# Patient Record
Sex: FEMALE | Race: WHITE | NOT HISPANIC OR LATINO | Employment: FULL TIME | URBAN - METROPOLITAN AREA
[De-identification: names, ages, dates, MRNs, and addresses within clinical notes are randomized per-mention and may not be internally consistent; named-entity substitution may affect disease eponyms.]

---

## 2017-01-02 ENCOUNTER — MYC MEDICAL ADVICE (OUTPATIENT)
Dept: PEDIATRICS | Facility: CLINIC | Age: 47
End: 2017-01-02

## 2017-01-05 NOTE — TELEPHONE ENCOUNTER
My work office advised they faxed a form over to your office. Did you receive it?    Pita  ----- Message -----  From: Office of SHAHRAM Ambriz CNP  Sent: 1/3/2017 7:40 AM CST  To: Pita Shaffer  Subject: RE: Return to Work    Good morning Pita,    Is there a form you would like to drop off or fax to the clinic with the specific documentation you need? Our fax number is [phone].    Thank you,   Ale DE LANGEL RN    ----- Message -----  From: PITA SHAFFER  Sent: 1/2/2017 10:21 PM CST  To: SHAHRAM Ambriz CNP  Subject: Return to Work    Harish Olivera. I forgot to ask when I saw you on Friday if you could sign a return to work document for me. I was on FMLA leave for the past 30+ days while in rehab. I need a letter signed by a doctor that i may return to work. Is this something you can provide?    Regards,  Pita

## 2017-01-05 NOTE — TELEPHONE ENCOUNTER
Staff unable to locate faxed FMLA forms and there is no documentation in patient's chart that forms were received. Sent patient [employer] message asking her if she can have her employer refax forms to fax #: [phone]. Staff will await reply/faxed forms.  Sofia Sullivan CMA

## 2017-01-06 ENCOUNTER — MEDICAL CORRESPONDENCE (OUTPATIENT)
Dept: HEALTH INFORMATION MANAGEMENT | Facility: CLINIC | Age: 47
End: 2017-01-06

## 2017-01-06 NOTE — TELEPHONE ENCOUNTER
Received MyChart message from patient stating that employer has faxed FMLA forms. Patient did not specify which fax number forms were sent to. Staff has checked the fax machines in the area and in the . No forms have been found. Sent patient MyChart message stating that clinic has not received forms.  Sofia Sullivan CMA

## 2017-01-09 NOTE — TELEPHONE ENCOUNTER
[facility] CLINICAL DOCUMENTATION    Form Documentation Form or Letter Request    Type or form/letter needing completion: Fitness for Duty Clarification  Provider: Jaz Torres  Has provider seen patient for office visit related to reason for form request? Yes  Date form needed: 01/09/17 by 12:00 PM  Once completed: Fax form to: [facility][id number] at fax #: [phone]. Send patient [employer] message once form has been faxed.   Sofia Sullivan, CMA

## 2017-01-09 NOTE — TELEPHONE ENCOUNTER
Forms faxed, original mailed to patient. Copy sent to be scanned into patient's chart.    VINCE Lepe

## 2017-01-29 ENCOUNTER — MYC REFILL (OUTPATIENT)
Dept: PEDIATRICS | Facility: CLINIC | Age: 47
End: 2017-01-29

## 2017-01-29 DIAGNOSIS — M51.369 DDD (DEGENERATIVE DISC DISEASE), LUMBAR: ICD-10-CM

## 2017-01-30 RX ORDER — HYDROCODONE BITARTRATE AND ACETAMINOPHEN 5; 325 MG/1; MG/1
1 TABLET ORAL EVERY 6 HOURS PRN
Qty: 15 TABLET | Refills: 0 | Status: SHIPPED | OUTPATIENT
Start: 2017-01-30 | End: 2017-03-07

## 2017-01-30 NOTE — TELEPHONE ENCOUNTER
HYDROcodone-acetaminophen (NORCO) 5-325 MG per tablet      Last Written Prescription Date:  12/30/16  Last Fill Quantity: 15,   # refills: 0  Last Office Visit with [facility], [facility] or [facility] prescribing provider: 12/30/16.  Future Office visit:       Routing refill request to provider for review/approval because:  Drug not on the [facility], [facility] or [facility] refill protocol or controlled substance

## 2017-01-30 NOTE — TELEPHONE ENCOUNTER
Message from [employer]:  Original authorizing provider: SHAHRAM Ambriz CNP would like a refill of the following medications:  HYDROcodone-acetaminophen (NORCO) 5-325 MG per tablet [SHAHRAM Ambriz CNP]    Preferred pharmacy: [facility]    Comment:

## 2017-01-30 NOTE — TELEPHONE ENCOUNTER
Please inform patient, refill/prescription approved and when prescription is ready to be picked up at .

## 2017-02-06 ENCOUNTER — MYC REFILL (OUTPATIENT)
Dept: PEDIATRICS | Facility: CLINIC | Age: 47
End: 2017-02-06

## 2017-02-06 DIAGNOSIS — M51.369 DDD (DEGENERATIVE DISC DISEASE), LUMBAR: ICD-10-CM

## 2017-02-07 RX ORDER — METHOCARBAMOL 750 MG/1
750 TABLET, FILM COATED ORAL EVERY 4 HOURS PRN
Qty: 180 TABLET | Refills: 0 | Status: SHIPPED | OUTPATIENT
Start: 2017-02-07 | End: 2017-05-25

## 2017-02-07 NOTE — TELEPHONE ENCOUNTER
methocarbamol (METHOCARBAMOL) 750 MG tablet      Last Written Prescription Date:  1/10/17  Last Fill Quantity: 180,   # refills: 0  Last Office Visit with [facility], [facility] or [facility] prescribing provider: 12/30/16  Future Office visit:       Routing refill request to provider for review/approval because:  Drug not on the [facility], [facility] or [facility] refill protocol or controlled substance

## 2017-02-07 NOTE — TELEPHONE ENCOUNTER
Message from MyChart:  Original authorizing provider: SHAHRAM Ambriz CNP would like a refill of the following medications:  methocarbamol (METHOCARBAMOL) 750 MG tablet [SHAHRAM Ambriz CNP]    Preferred pharmacy: [facility] [address] [place] [address]    Comment:

## 2017-02-22 ENCOUNTER — TRANSFERRED RECORDS (OUTPATIENT)
Dept: HEALTH INFORMATION MANAGEMENT | Facility: CLINIC | Age: 47
End: 2017-02-22

## 2017-02-23 ENCOUNTER — MYC MEDICAL ADVICE (OUTPATIENT)
Dept: PEDIATRICS | Facility: CLINIC | Age: 47
End: 2017-02-23

## 2017-02-23 NOTE — TELEPHONE ENCOUNTER
Hi.  I just faxed over diagnostic tests for myself and my , David Maher.  Please confirm you received the two documents.    Regards,  Pita  [phone]

## 2017-02-24 NOTE — TELEPHONE ENCOUNTER
Unable to find faxes that patient sent over this am.  Sent a [employer] message to patient to see what fax number she sent them too?    Christy Mcdermott CMA

## 2017-02-27 NOTE — TELEPHONE ENCOUNTER
Noted:  Hi.  I have tried several times to send the documents, but they are rejected for some reason.  I will just bring them in next time I have an appointment.    Thank you.  Pita Henderson RN,   [facility]

## 2017-03-07 ENCOUNTER — MYC REFILL (OUTPATIENT)
Dept: PEDIATRICS | Facility: CLINIC | Age: 47
End: 2017-03-07

## 2017-03-07 DIAGNOSIS — M51.369 DDD (DEGENERATIVE DISC DISEASE), LUMBAR: ICD-10-CM

## 2017-03-07 RX ORDER — HYDROCODONE BITARTRATE AND ACETAMINOPHEN 5; 325 MG/1; MG/1
1 TABLET ORAL EVERY 6 HOURS PRN
Qty: 15 TABLET | Refills: 0 | Status: SHIPPED | OUTPATIENT
Start: 2017-03-07 | End: 2017-04-14

## 2017-03-07 NOTE — TELEPHONE ENCOUNTER
HYDROcodone-acetaminophen (NORCO) 5-325 MG per tablet      Last Written Prescription Date:  01/30/17  Last Fill Quantity: 15,   # refills: 0  Last Office Visit with [facility], [facility] or [facility] prescribing provider: 12/30/16.  Future Office visit:       Routing refill request to provider for review/approval because:  Drug not on the [facility], [facility] or [facility] refill protocol or controlled substance

## 2017-03-07 NOTE — TELEPHONE ENCOUNTER
Please inform patient, refill/prescriptioni approved. Please drop off prescription at pharmacy.

## 2017-03-07 NOTE — TELEPHONE ENCOUNTER
Message from MyChart:  Original authorizing provider: SHAHRAM Ambriz CNP would like a refill of the following medications:  HYDROcodone-acetaminophen (NORCO) 5-325 MG per tablet [SHAHRAM Ambriz CNP]    Preferred pharmacy: [facility] [address], [address]    Comment:

## 2017-04-11 DIAGNOSIS — I10 ESSENTIAL HYPERTENSION WITH GOAL BLOOD PRESSURE LESS THAN 140/90: ICD-10-CM

## 2017-04-14 ENCOUNTER — MYC REFILL (OUTPATIENT)
Dept: PEDIATRICS | Facility: CLINIC | Age: 47
End: 2017-04-14

## 2017-04-14 DIAGNOSIS — M51.369 DDD (DEGENERATIVE DISC DISEASE), LUMBAR: ICD-10-CM

## 2017-04-14 RX ORDER — LOSARTAN POTASSIUM 100 MG/1
TABLET ORAL
Qty: 30 TABLET | Refills: 0 | Status: SHIPPED | OUTPATIENT
Start: 2017-04-14 | End: 2017-05-25

## 2017-04-14 RX ORDER — HYDROCODONE BITARTRATE AND ACETAMINOPHEN 5; 325 MG/1; MG/1
1 TABLET ORAL EVERY 6 HOURS PRN
Qty: 15 TABLET | Refills: 0 | Status: SHIPPED | OUTPATIENT
Start: 2017-04-14 | End: 2017-05-12

## 2017-04-14 NOTE — TELEPHONE ENCOUNTER
Losartin (Cozaar)  Last Written Prescription Date: 4/18/2016  Last Fill Quantity: 90, # refills: 3  Last Office Visit with [facility] or [facility] prescribing provider: 12/30/2016       Potassium   Date Value Ref Range Status   04/18/2016 3.7 3.4 - 5.3 mmol/L Final     Creatinine   Date Value Ref Range Status   04/18/2016 0.83 0.52 - 1.04 mg/dL Final     BP Readings from Last 3 Encounters:   12/30/16 134/78   04/18/16 140/70   11/30/15 120/70     Refilled per [facility] protocol. At next refill request pt will need to have had updated creatinine and potassium lab.     Shawnee Goldstein RN

## 2017-04-14 NOTE — TELEPHONE ENCOUNTER
Message from [employer]:  Original authorizing provider: SHAHRAM Ambriz CNP would like a refill of the following medications:  HYDROcodone-acetaminophen (NORCO) 5-325 MG per tablet [SHAHRAM Ambriz CNP]    Preferred pharmacy: [facility] [address], [address]    Comment:

## 2017-04-14 NOTE — TELEPHONE ENCOUNTER
HYDROcodone-acetaminophen (NORCO) 5-325 MG per tablet      Last Written Prescription Date:  3/7/17  Last Fill Quantity: 15,   # refills: 0  Last Office Visit with [facility], [facility] or [facility] prescribing provider: 12/30/16  Future Office visit:       Routing refill request to provider for review/approval because:  Drug not on the [facility], [facility], [facility] or [facility] refill protocol or controlled substance

## 2017-04-28 ENCOUNTER — MYC REFILL (OUTPATIENT)
Dept: PEDIATRICS | Facility: CLINIC | Age: 47
End: 2017-04-28

## 2017-04-28 DIAGNOSIS — B00.9 RECURRENT HERPES SIMPLEX: ICD-10-CM

## 2017-04-28 RX ORDER — VALACYCLOVIR HYDROCHLORIDE 500 MG/1
500 TABLET, FILM COATED ORAL DAILY
Qty: 90 TABLET | Refills: 0 | Status: SHIPPED | OUTPATIENT
Start: 2017-04-28 | End: 2017-07-31

## 2017-04-28 NOTE — TELEPHONE ENCOUNTER
Prescription approved for nury refill per [facility] Refill Protocol. Requires annual creatinine    Creatinine   Date Value Ref Range Status   04/18/2016 0.83 0.52 - 1.04 mg/dL Final   ]  Informed patient via [employer].    Ale Martinez RN, [facility]

## 2017-04-28 NOTE — TELEPHONE ENCOUNTER
Message from MyChart:  Original authorizing provider: SHAHRAM Ambriz CNP would like a refill of the following medications:  valACYclovir (VALTREX) 500 MG tablet [SHAHRAM Ambriz CNP]    Preferred pharmacy: [facility] [address] [place] [address]    Comment:

## 2017-04-28 NOTE — TELEPHONE ENCOUNTER
valACYclovir (VALTREX) 500 MG tablet      Last Written Prescription Date: 4/18/16  Last Fill Quantity: 90,  # refills: 3   Last Office Visit with [facility] or [facility] prescribing provider: 12/30/16      VINCE Lepe

## 2017-05-12 ENCOUNTER — MYC REFILL (OUTPATIENT)
Dept: PEDIATRICS | Facility: CLINIC | Age: 47
End: 2017-05-12

## 2017-05-12 DIAGNOSIS — M51.369 DDD (DEGENERATIVE DISC DISEASE), LUMBAR: ICD-10-CM

## 2017-05-15 RX ORDER — HYDROCODONE BITARTRATE AND ACETAMINOPHEN 5; 325 MG/1; MG/1
1 TABLET ORAL EVERY 6 HOURS PRN
Qty: 15 TABLET | Refills: 0 | Status: SHIPPED | OUTPATIENT
Start: 2017-05-15 | End: 2017-06-13

## 2017-05-15 NOTE — TELEPHONE ENCOUNTER
Message from [email or web address]:  Original authorizing provider: SHAHRAM Ambriz CNP would like a refill of the following medications:  HYDROcodone-acetaminophen (NORCO) 5-325 MG per tablet [SHAHRAM Ambriz CNP]    Preferred pharmacy: [facility] [address], [address]    Comment:

## 2017-05-15 NOTE — TELEPHONE ENCOUNTER
HYDROcodone-acetaminophen (NORCO) 5-325 MG per tablet      Last Written Prescription Date:  04/14/17  Last Fill Quantity: 15,   # refills: 0  Last Office Visit with [facility] or [facility] prescribing provider: 12/30/16.    Future Office visit:    Next 5 appointments (look out 90 days)     May 23, 2017 12:00 PM CDT   Lauren Louis with SHAHRAM Ambriz CNP   [facility] ([facility])    [address] [place] [id number] [phone]                   Routing refill request to provider for review/approval because:  Drug not on the [facility], [facility] or [facility] refill protocol or controlled substance

## 2017-05-25 ENCOUNTER — OFFICE VISIT (OUTPATIENT)
Dept: PEDIATRICS | Facility: CLINIC | Age: 47
End: 2017-05-25
Payer: COMMERCIAL

## 2017-05-25 ENCOUNTER — RADIANT APPOINTMENT (OUTPATIENT)
Dept: GENERAL RADIOLOGY | Facility: CLINIC | Age: 47
End: 2017-05-25
Attending: NURSE PRACTITIONER
Payer: COMMERCIAL

## 2017-05-25 VITALS
HEIGHT: 65 IN | OXYGEN SATURATION: 100 % | DIASTOLIC BLOOD PRESSURE: 78 MMHG | BODY MASS INDEX: 35.29 KG/M2 | WEIGHT: 211.8 LBS | TEMPERATURE: 98.8 F | SYSTOLIC BLOOD PRESSURE: 114 MMHG | HEART RATE: 78 BPM

## 2017-05-25 DIAGNOSIS — M79.672 BILATERAL FOOT PAIN: ICD-10-CM

## 2017-05-25 DIAGNOSIS — M79.671 BILATERAL FOOT PAIN: ICD-10-CM

## 2017-05-25 DIAGNOSIS — I10 HTN, GOAL BELOW 140/90: Primary | ICD-10-CM

## 2017-05-25 DIAGNOSIS — F43.22 ADJUSTMENT DISORDER WITH ANXIETY: ICD-10-CM

## 2017-05-25 PROCEDURE — 99214 OFFICE O/P EST MOD 30 MIN: CPT | Performed by: NURSE PRACTITIONER

## 2017-05-25 PROCEDURE — 73630 X-RAY EXAM OF FOOT: CPT | Mod: RT | Performed by: RADIOLOGY

## 2017-05-25 RX ORDER — TOPIRAMATE 25 MG/1
TABLET, FILM COATED ORAL
COMMUNITY

## 2017-05-25 RX ORDER — PHENTERMINE HYDROCHLORIDE 15 MG/1
15 CAPSULE ORAL EVERY MORNING
COMMUNITY
End: 2020-07-13

## 2017-05-25 ASSESSMENT — ANXIETY QUESTIONNAIRES
GAD7 TOTAL SCORE: 2
5. BEING SO RESTLESS THAT IT IS HARD TO SIT STILL: NOT AT ALL
3. WORRYING TOO MUCH ABOUT DIFFERENT THINGS: NOT AT ALL
6. BECOMING EASILY ANNOYED OR IRRITABLE: SEVERAL DAYS
IF YOU CHECKED OFF ANY PROBLEMS ON THIS QUESTIONNAIRE, HOW DIFFICULT HAVE THESE PROBLEMS MADE IT FOR YOU TO DO YOUR WORK, TAKE CARE OF THINGS AT HOME, OR GET ALONG WITH OTHER PEOPLE: NOT DIFFICULT AT ALL
2. NOT BEING ABLE TO STOP OR CONTROL WORRYING: SEVERAL DAYS
7. FEELING AFRAID AS IF SOMETHING AWFUL MIGHT HAPPEN: NOT AT ALL
1. FEELING NERVOUS, ANXIOUS, OR ON EDGE: NOT AT ALL

## 2017-05-25 ASSESSMENT — PATIENT HEALTH QUESTIONNAIRE - PHQ9: 5. POOR APPETITE OR OVEREATING: NOT AT ALL

## 2017-05-25 NOTE — PROGRESS NOTES
Gilbert Maher,    Attached are your test results.    I think you may have Sesamoiditis. It is an inflammatory condition affecting the sesamoid bones of the 1st metatarsophalangeal joint causing pain in the forefoot, particularly on weight bearing  Will refer to podiatry     Please call [phone] to make appointment  if you do not hear from referrals in the next few days.      Please contact us if you have any questions.    Jaz Torres, CNP

## 2017-05-25 NOTE — PROGRESS NOTES
SUBJECTIVE:                                                    Pita Maher is a 47 year old female who presents to clinic today for the following health issues:      Medication Followup     Patient stopped taking blood pressure meds in April/see list of B/P readings patient brought with her today.      Taking Medication as prescribed: yes    Side Effects:  None    Medication Helping Symptoms:  yes     PROBLEMS TO ADD ON...  Hypertension Follow-up      Outpatient blood pressures are being checked at home.  Results are good .    Low Salt Diet: no added salt     Also, she has additional complaints of :  PROBLEMS TO ADD ON...    Depression and Anxiety Follow-Up    Status since last visit: Improved     Other associated symptoms:None    Complicating factors:     Significant life event: No     Current substance abuse: Alcohol none for almost 6 months    Just graduated from intensive outpatient program at [facility] last week    Sees the psychiatrist regularly     Is going to continuing care once a week at [facility] with a group     PHQ-9 SCORE 4/18/2016 12/30/2016 5/25/2017   Total Score - - -   Total Score 18 4 2     FRAN-7 SCORE 4/18/2016 12/30/2016 5/25/2017   Total Score - - -   Total Score 2 5 2        PHQ-9  English      PHQ-9   Any Language     GAD7       Problem list and histories reviewed & adjusted, as indicated.  Additional history: as documented    Patient Active Problem List   Diagnosis     Insomnia     Bariatric surgery status     Chronic headache     Recurrent herpes simplex     CARDIOVASCULAR SCREENING; LDL GOAL LESS THAN 160     Obesity, Class I, BMI 30-34.9     Adjustment disorder with anxiety     Pyelonephritis     Advanced directives, counseling/discussion     HTN, goal below 140/90     Rotator cuff disorder     S/P rotator cuff repair     Rotator cuff (capsule) sprain     Rotator cuff tear     DDD (degenerative disc disease), lumbar     DDD (degenerative disc disease), cervical     Abnormal  TSH     Past Surgical History:   Procedure Laterality Date     ARTHROSCOPIC RECONSTRUCTION ANTERIOR AND POSTERIOR CRUCIATE LIGAMENT, COMBINED       BACK SURGERY  2014    steroid injections     BACK SURGERY  2014    steroid injections     BIOPSY  1991 & 1992    left ear & left breast     BREAST SURGERY  1993    lump removal     BREAST SURGERY  1992    lump removal     COLONOSCOPY  1/22/2013    Procedure: COLONOSCOPY;  colonoscopy, loose stools, blood in stool;  Surgeon: Kamari Solomon MD;  Location: [facility] OR     CRYOTHERAPY  20 years ago     DILATION AND CURETTAGE SUCTION  2/24/2012    Procedure:DILATION AND CURETTAGE SUCTION; DILATION, CURETTAGE AND SUCTION ; Surgeon:JOHN HORNER; Location:[facility]     ENT SURGERY  1974    tonsils removed     LAPAROSCOPIC BYPASS GASTRIC       lump removed from left ear[       SURGICAL HISTORY OF -   1989    lt knee acl reconstruction     SURGICAL HISTORY OF -   2001    lt breast lump removal     SURGICAL HISTORY OF -   1999    gastric bypass (Katie en Y) by Dr. Fontenot at [facility]     SURGICAL HISTORY OF -   1974    tonsillectomy     SURGICAL HISTORY OF -   2005    LASIK       Social History   Substance Use Topics     Smoking status: Never Smoker     Smokeless tobacco: Never Used      Comment: EX- SMOKED IN HOUSE FOR PAST 10 YEARS     Alcohol use No     Family History   Problem Relation Age of Onset     Lipids Mother      Hypertension Mother      Anesthesia Reaction Mother      C.A.D. Father      first MI at 40 years old (was a smoker)     Lipids Father      HEART DISEASE Father      Hypertension Father      C.A.D. Maternal Grandmother      not premature presentation     CANCER Maternal Grandmother      skin     C.A.D. Paternal Grandmother      not premature presentation     DIABETES Paternal Grandmother      Hypertension Paternal Grandmother      Anxiety Disorder Brother      MENTAL ILLNESS Other      Breast Cancer No family hx of      Cancer - colorectal No  family hx of          Current Outpatient Prescriptions   Medication Sig Dispense Refill     phentermine 15 MG capsule Take 15 mg by mouth every morning       topiramate (TOPAMAX) 25 MG tablet Take 25 mg by mouth 5 tablets at bedtime       HYDROcodone-acetaminophen (NORCO) 5-325 MG per tablet Take 1 tablet by mouth every 6 hours as needed for pain 15 tablet 0     valACYclovir (VALTREX) 500 MG tablet Take 1 tablet (500 mg) by mouth daily 90 tablet 0     QUETIAPINE FUMARATE PO Take 50 mg by mouth every 6 hours as needed       hydrochlorothiazide (HYDRODIURIL) 25 MG tablet Take 1 tablet (25 mg) by mouth daily 90 tablet 1     [DISCONTINUED] QUETIAPINE FUMARATE PO Take 300 mg by mouth At Bedtime       Allergies   Allergen Reactions     Lomotil      BP Readings from Last 3 Encounters:   05/25/17 114/78   12/30/16 134/78   04/18/16 140/70    Wt Readings from Last 3 Encounters:   05/25/17 211 lb 12.8 oz (96.1 kg)   12/30/16 225 lb 14.4 oz (102.5 kg)   04/18/16 222 lb 1.6 oz (100.7 kg)            Reviewed and updated as needed this visit by clinical staff  Tobacco  Allergies  Med Hx  Surg Hx  Fam Hx  Soc Hx      Reviewed and updated as needed this visit by Provider         ROS:  CONSTITUTIONAL:POSITIVE  for weight loss and NEGATIVE  for anorexia  RESP:NEGATIVE for significant cough or SOB  CV: NEGATIVE for chest pain, palpitations or peripheral edema  GI: NEGATIVE for nausea, abdominal pain, heartburn, or change in bowel habits  MUSCULOSKELETAL: NEGATIVE for significant arthralgias or myalgia except for chronic issue with her low back. Bilateral feet pain for about a month. Sees a chiropractor and is adjustment helps pain and then after bell will start   NEURO: NEGATIVE for weakness, dizziness or paresthesias  PSYCHIATRIC: POSITIVE for is with working with psychiatrist and he placed her on phentermine and the topamax along with the [place]  and NEGATIVE forthoughts of hurting someone else and thoughts of self  "harm    OBJECTIVE:                                                    /78  Pulse 78  Temp 98.8  F (37.1  C) (Temporal)  Ht 5' 4.75\" (1.645 m)  Wt 211 lb 12.8 oz (96.1 kg)  LMP 05/25/2017  SpO2 100%  BMI 35.52 kg/m2  Body mass index is 35.52 kg/(m^2).  GENERAL APPEARANCE: alert, active and no distress  NECK: no adenopathy  RESP: lungs clear to auscultation - no rales, rhonchi or wheezes  CV: regular rates and rhythm and no murmur, click or rub  MS: extremities normal- no gross deformities noted  Foot exam - both sides normal; no swelling, tenderness or skin or vascular lesions. Color and temperature is normal. Sensation is intact. Peripheral pulses are palpable. Toenails are normal.  SKIN: no suspicious lesions or rashes  NEURO: Normal strength and tone, mentation intact and speech normal  PSYCH: mentation appears normal and affect normal/bright    Diagnostic Test Results:  Recent Results (from the past 744 hour(s))   XR Foot Bilateral G/E 3 Views    Narrative    3 views bilateral foot radiographs 5/25/2017 12:24 PM    History: Pain in right foot, Pain in left foot    Comparison: None available.    Findings:    Standing AP, oblique, and lateral  views of the each foot were  obtained.     Left: No acute osseous abnormality.      Lisfranc articulation alignment is congruent on this non-weight  bearing images.    Horizontal cleft with possible additional fragment in the tibial  hallux sesamoid, presumably multi fragmented variant. Os navicular,  type I. Os trigonum.    Soft tissue is unremarkable.    Right: No acute osseous abnormality.     Horizontal cleft of the tibial hallux sesamoid, likely bipartite  variant. Small os navicular, type I. Os trigonum.     Lisfranc articulation alignment is congruent on this non-weight  bearing images.    Soft tissue is unremarkable.      Impression    Impression:  1. No acute osseous abnormality.  2. Likely bipartite sesamoid and fragmented variant of the " bilateral  tibial hallux sesamoids. Correlate clinically as differential  diagnosis of these findings include sequelae of posttraumatic or  nontraumatic sesamoiditis.    DALIA ORTIZ          ASSESSMENT/PLAN:                                                      Pita was seen today for recheck medication.    Diagnoses and all orders for this visit:    HTN, goal below 140/90  The current medical regimen is effective;  continue present plan and medications.    Adjustment disorder with anxiety  FOLLOW UP WITH SPECIALIST :Psychiatry    Bilateral foot pain  -     XR Foot Bilateral G/E 3 Views; Future  Will follow up and/or notify patient of  results via My Chart to determine further need for followup      PLAN:    Patient needs to follow up in if no improvement,or sooner if worsening of symptoms or other symptoms develop.  Schedule physical exam in December   Will follow up and/or notify patient of  results via My Chart to determine further need for followup      See Patient Instructions    SHAHRAM Ambriz [facility]

## 2017-05-25 NOTE — NURSING NOTE
"Chief Complaint   Patient presents with     Recheck Medication       Initial /78  Pulse 78  Temp 98.8  F (37.1  C) (Temporal)  Ht 5' 4.75\" (1.645 m)  Wt 211 lb 12.8 oz (96.1 kg)  LMP 05/25/2017  SpO2 100%  BMI 35.52 kg/m2 Estimated body mass index is 35.52 kg/(m^2) as calculated from the following:    Height as of this encounter: 5' 4.75\" (1.645 m).    Weight as of this encounter: 211 lb 12.8 oz (96.1 kg).  Medication Reconciliation: complete     Christy Mcdermott CMA  "

## 2017-05-25 NOTE — MR AVS SNAPSHOT
After Visit Summary   5/25/2017    Pita Maher    MRN: [id number]           Patient Information     Date Of Birth          1970        Visit Information        Provider Department      5/25/2017 11:20 AM Jaz Torres APRN CNP [facility]        Today's Diagnoses     HTN, goal below 140/90    -  1    Bilateral foot pain          Care Instructions    PLAN:   1.   Symptomatic therapy suggested: Continue current medications.  2.  Orders Placed This Encounter   Medications     phentermine 15 MG capsule     Sig: Take 15 mg by mouth every morning     topiramate (TOPAMAX) 25 MG tablet     Sig: Take 25 mg by mouth 5 tablets at bedtime     Orders Placed This Encounter   Procedures     XR Foot Bilateral G/E 3 Views       3. Patient needs to follow up in if no improvement,or sooner if worsening of symptoms or other symptoms develop.  Schedule physical exam in December   Will follow up and/or notify patient of  results via My Chart to determine further need for followup    It was a pleasure seeing you today at the [facility]. Thank you for allowing us to care for you today. We truly hope we provided you with the excellent service you deserve. Please let us know if there is anything else we can do for you so we can be sure you are leaving completley satisfied with your care experience.       General information about your clinic   Clinic Hours Lab Hours (Appointments are required)   Mon-Thurs: 7:30 AM - 7 PM Mon-Thurs: 7:30 AM - 7 PM   Fri: 7:30 AM - 5 PM Fri: 7:30 AM - 5 PM        After Hours Nurse Advise & Appts:  [facility] Nurse Advisors: [phone]  [facility] On Call: to make appointments anytime: [phone] On Call Physician: call [phone] and answering service will page the on call physician.        For urgent appointments, please call [phone] and ask for the triage nurse or your care team clinic nurse.  How to contact my care  team:  Lauren: [email or web address]   Phone: [phone]   Fax: [phone]       [facility]:   Phone: [phone]  Hours: 8:00 AM - 6:00 PM  Medication Refills:  Call your pharmacy and they will forward the refill to us. Please allow 3 business days for your refills to be completed.       Normal or non-critical lab and imaging results will be communicated to you by MyChart, letter or phone within 7 days.  If you do not hear from us within 10 days, please call the clinic. If you have a critical or abnormal lab result, we will notify you by phone as soon as possible.       We now have PWIC (Pediatric Walk in Care)  Monday-Friday from 7:30-4. Simply walk in and be seen for your urgent needs like cough, fever, rash, diarrhea or vomiting, pink eye, UTI. No appointments needed. Ask one of the team for more information      -Your Care Team:    Dr. Ranulfo Alvarez - Internal Medicine  Dr. Lizette Gaviria - Internal Medicine/Pediatrics   Dr. Taqueria Siegel - Family Medicine  Dr. Gaby Degroot - Pediatrics  Dr. Raquel Stephenson - Pediatrics  Jaz Torres CNP - Family Practice Nurse Practitioner                         Follow-ups after your visit        Future tests that were ordered for you today     Open Future Orders        Priority Expected Expires Ordered    XR Foot Bilateral G/E 3 Views Routine 5/25/2017 5/25/2018 5/25/2017            Who to contact     If you have questions or need follow up information about today's clinic visit or your schedule please contact [facility] directly at [phone].  Normal or non-critical lab and imaging results will be communicated to you by MyChart, letter or phone within 4 business days after the clinic has received the results. If you do not hear from us within 7 days, please contact the clinic through [employer] or phone. If you have a critical or abnormal lab result, we will notify you by phone as soon as possible.  Submit refill requests through [employer] or call  "your pharmacy and they will forward the refill request to us. Please allow 3 business days for your refill to be completed.          Additional Information About Your Visit        [employer] Information     [email or web address] gives you secure access to your electronic health record. If you see a primary care provider, you can also send messages to your care team and make appointments. If you have questions, please call your primary care clinic.  If you do not have a primary care provider, please call [phone] and they will assist you.      [email or web address] is an electronic gateway that provides easy, online access to your medical records. With [email or web address], you can request a clinic appointment, read your test results, renew a prescription or communicate with your care team.     To access your existing account, please contact your [facility] or call [phone] for assistance.        Care EveryWhere ID     This is your Care EveryWhere ID. This could be used by other organizations to access your [place] medical records  [id number]        Your Vitals Were     Pulse Temperature Height Last Period Pulse Oximetry BMI (Body Mass Index)    78 98.8  F (37.1  C) (Temporal) 5' 4.75\" (1.645 m) 05/25/2017 100% 35.52 kg/m2       Blood Pressure from Last 3 Encounters:   05/25/17 114/78   12/30/16 134/78   04/18/16 140/70    Weight from Last 3 Encounters:   05/25/17 211 lb 12.8 oz (96.1 kg)   12/30/16 225 lb 14.4 oz (102.5 kg)   04/18/16 222 lb 1.6 oz (100.7 kg)               Primary Care Provider Office Phone # Fax #    SHAHRAM Ambriz [facility] [phone]       [facility] [address]  [place] [address]        Thank you!     Thank you for choosing [facility]  for your care. Our goal is always to provide you with excellent care. Hearing back from our patients is one way we can continue to improve our services. Please take a few minutes to complete the " written survey that you may receive in the mail after your visit with us. Thank you!             Your Updated Medication List - Protect others around you: Learn how to safely use, store and throw away your medicines at www.disposemymeds.org.          This list is accurate as of: 5/25/17 12:01 PM.  Always use your most recent med list.                   Brand Name Dispense Instructions for use    hydrochlorothiazide 25 MG tablet    HYDRODIURIL    90 tablet    Take 1 tablet (25 mg) by mouth daily       HYDROcodone-acetaminophen 5-325 MG per tablet    NORCO    15 tablet    Take 1 tablet by mouth every 6 hours as needed for pain       phentermine 15 MG capsule      Take 15 mg by mouth every morning       QUETIAPINE FUMARATE PO      Take 50 mg by mouth every 6 hours as needed       topiramate 25 MG tablet    TOPAMAX     Take 25 mg by mouth 5 tablets at bedtime       valACYclovir 500 MG tablet    VALTREX    90 tablet    Take 1 tablet (500 mg) by mouth daily

## 2017-05-25 NOTE — PATIENT INSTRUCTIONS
PLAN:   1.   Symptomatic therapy suggested: Continue current medications.  2.  Orders Placed This Encounter   Medications     phentermine 15 MG capsule     Sig: Take 15 mg by mouth every morning     topiramate (TOPAMAX) 25 MG tablet     Sig: Take 25 mg by mouth 5 tablets at bedtime     Orders Placed This Encounter   Procedures     XR Foot Bilateral G/E 3 Views       3. Patient needs to follow up in if no improvement,or sooner if worsening of symptoms or other symptoms develop.  Schedule physical exam in December   Will follow up and/or notify patient of  results via My Chart to determine further need for followup    It was a pleasure seeing you today at the [facility]. Thank you for allowing us to care for you today. We truly hope we provided you with the excellent service you deserve. Please let us know if there is anything else we can do for you so we can be sure you are leaving completley satisfied with your care experience.       General information about your clinic   Clinic Hours Lab Hours (Appointments are required)   Mon-Thurs: 7:30 AM - 7 PM Mon-Thurs: 7:30 AM - 7 PM   Fri: 7:30 AM - 5 PM Fri: 7:30 AM - 5 PM        After Hours Nurse Advise & Appts:  [place] Nurse Advisors: [phone]  [place] On Call: to make appointments anytime: [phone] On Call Physician: call [phone] and answering service will page the on call physician.        For urgent appointments, please call [phone] and ask for the triage nurse or your care team clinic nurse.  How to contact my care team:  [employer]: [email or web address]   Phone: [phone]   Fax: [phone]       [facility]:   Phone: [phone]  Hours: 8:00 AM - 6:00 PM  Medication Refills:  Call your pharmacy and they will forward the refill to us. Please allow 3 business days for your refills to be completed.       Normal or non-critical lab and imaging results will be communicated to you by MyChart, letter or  phone within 7 days.  If you do not hear from us within 10 days, please call the clinic. If you have a critical or abnormal lab result, we will notify you by phone as soon as possible.       We now have PWIC (Pediatric Walk in Care)  Monday-Friday from 7:30-4. Simply walk in and be seen for your urgent needs like cough, fever, rash, diarrhea or vomiting, pink eye, UTI. No appointments needed. Ask one of the team for more information      -Your Care Team:    Dr. Ranulfo Alvarez - Internal Medicine  Dr. Lizette Gaviria - Internal Medicine/Pediatrics   Dr. Taqueria Siegel - Family Medicine  Dr. Gaby Degroot - Pediatrics  Dr. Raquel Stephenson - Pediatrics  Jaz Torres CNP - Family Practice Nurse Practitioner

## 2017-05-26 ASSESSMENT — ANXIETY QUESTIONNAIRES: GAD7 TOTAL SCORE: 2

## 2017-05-26 ASSESSMENT — PATIENT HEALTH QUESTIONNAIRE - PHQ9: SUM OF ALL RESPONSES TO PHQ QUESTIONS 1-9: 2

## 2017-05-31 ENCOUNTER — MYC REFILL (OUTPATIENT)
Dept: PEDIATRICS | Facility: CLINIC | Age: 47
End: 2017-05-31

## 2017-05-31 DIAGNOSIS — I10 ESSENTIAL HYPERTENSION WITH GOAL BLOOD PRESSURE LESS THAN 140/90: ICD-10-CM

## 2017-05-31 RX ORDER — HYDROCHLOROTHIAZIDE 25 MG/1
TABLET ORAL
Qty: 90 TABLET | Refills: 1 | Status: SHIPPED | OUTPATIENT
Start: 2017-05-31 | End: 2017-11-26

## 2017-05-31 RX ORDER — HYDROCHLOROTHIAZIDE 25 MG/1
25 TABLET ORAL DAILY
Qty: 90 TABLET | Refills: 1 | Status: CANCELLED | OUTPATIENT
Start: 2017-05-31

## 2017-05-31 NOTE — TELEPHONE ENCOUNTER
hydrochlorothiazide (HYDRODIURIL) 25 MG tablet      Last Written Prescription Date: 10/10/2016  Last Fill Quantity: 90 tabs, # refills: 1  Last Office Visit with [facility], [facility] or [facility] prescribing provider: 5/25/2017       Potassium   Date Value Ref Range Status   04/18/2016 3.7 3.4 - 5.3 mmol/L Final     Creatinine   Date Value Ref Range Status   04/18/2016 0.83 0.52 - 1.04 mg/dL Final     BP Readings from Last 3 Encounters:   05/25/17 114/78   12/30/16 134/78   04/18/16 140/70

## 2017-05-31 NOTE — TELEPHONE ENCOUNTER
Message from MyChart:  Original authorizing provider: SHAHRAM Ambriz CNP would like a refill of the following medications:  hydrochlorothiazide (HYDRODIURIL) 25 MG tablet [SHAHRAM Ambriz CNP]    Preferred pharmacy: [facility] [address] [place] [address]    Comment:

## 2017-05-31 NOTE — TELEPHONE ENCOUNTER
hctz      Last Written Prescription Date: 10/10/16  Last Fill Quantity: 90, # refills: 1  Last Office Visit with [facility] or [facility] prescribing provider: 5/25/17       Potassium   Date Value Ref Range Status   04/18/2016 3.7 3.4 - 5.3 mmol/L Final     Creatinine   Date Value Ref Range Status   04/18/2016 0.83 0.52 - 1.04 mg/dL Final     BP Readings from Last 3 Encounters:   05/25/17 114/78   12/30/16 134/78   04/18/16 140/70     Medication filled for 6 months per protocol.      Crystal Henderson RN, [facility]

## 2017-06-13 ENCOUNTER — MYC REFILL (OUTPATIENT)
Dept: PEDIATRICS | Facility: CLINIC | Age: 47
End: 2017-06-13

## 2017-06-13 DIAGNOSIS — M51.369 DDD (DEGENERATIVE DISC DISEASE), LUMBAR: ICD-10-CM

## 2017-06-13 RX ORDER — HYDROCODONE BITARTRATE AND ACETAMINOPHEN 5; 325 MG/1; MG/1
1 TABLET ORAL EVERY 6 HOURS PRN
Qty: 15 TABLET | Refills: 0 | Status: SHIPPED | OUTPATIENT
Start: 2017-06-13 | End: 2017-07-23

## 2017-06-13 NOTE — TELEPHONE ENCOUNTER
Message from [employer]:  Original authorizing provider: SHAHRAM Ambriz CNP would like a refill of the following medications:  HYDROcodone-acetaminophen (NORCO) 5-325 MG per tablet [SHAHRAM Ambriz CNP]    Preferred pharmacy: [facility] [address], [address]    Comment:

## 2017-06-13 NOTE — TELEPHONE ENCOUNTER
HYDROcodone-acetaminophen (NORCO) 5-325 MG per tablet      Last Written Prescription Date:  05/15/17  Last Fill Quantity: 15,   # refills: 0  Last Office Visit with [facility], [facility] or [facility] prescribing provider: 05/25/17  Future Office visit:       Routing refill request to provider for review/approval because:  Drug not on the [facility], [facility] or [facility] refill protocol or controlled substance

## 2017-06-14 ENCOUNTER — OFFICE VISIT (OUTPATIENT)
Dept: PODIATRY | Facility: CLINIC | Age: 47
End: 2017-06-14
Attending: NURSE PRACTITIONER
Payer: COMMERCIAL

## 2017-06-14 VITALS
HEART RATE: 79 BPM | DIASTOLIC BLOOD PRESSURE: 79 MMHG | WEIGHT: 211.8 LBS | SYSTOLIC BLOOD PRESSURE: 119 MMHG | BODY MASS INDEX: 35.29 KG/M2 | OXYGEN SATURATION: 99 % | HEIGHT: 65 IN

## 2017-06-14 DIAGNOSIS — M79.671 RIGHT FOOT PAIN: Primary | ICD-10-CM

## 2017-06-14 PROCEDURE — 99202 OFFICE O/P NEW SF 15 MIN: CPT | Performed by: PODIATRIST

## 2017-06-14 ASSESSMENT — PAIN SCALES - GENERAL: PAINLEVEL: MILD PAIN (3)

## 2017-06-14 NOTE — MR AVS SNAPSHOT
After Visit Summary   6/14/2017    Pita Maher    MRN: [id number]           Patient Information     Date Of Birth          1970        Visit Information        Provider Department      6/14/2017 2:00 PM Maynor López DPM [facility]        Today's Diagnoses     Right foot pain    -  1       Follow-ups after your visit        Your next 10 appointments already scheduled     Jun 21, 2017 10:30 AM CDT   [facility] with Maynor [facility]pez [facility]    [address] [place] [id number] [phone]              Who to contact     If you have questions or need follow up information about today's clinic visit or your schedule please contact [facility] directly at [phone].  Normal or non-critical lab and imaging results will be communicated to you by [employer], letter or phone within 4 business days after the clinic has received the results. If you do not hear from us within 7 days, please contact the clinic through [employer] or phone. If you have a critical or abnormal lab result, we will notify you by phone as soon as possible.  Submit refill requests through [employer] or call your pharmacy and they will forward the refill request to us. Please allow 3 business days for your refill to be completed.          Additional Information About Your Visit        [employer] Information     [employer] gives you secure access to your electronic health record. If you see a primary care provider, you can also send messages to your care team and make appointments. If you have questions, please call your primary care clinic.  If you do not have a primary care provider, please call [phone] and they will assist you.      [employer] is an electronic gateway that provides easy, online access to your medical records. With [employer], you can request a clinic appointment, read your test results, renew a prescription or  "communicate with your care team.     To access your existing account, please contact your [facility] or call [phone] for assistance.        Care EveryWhere ID     This is your Care EveryWhere ID. This could be used by other organizations to access your [place] medical records  [id number]        Your Vitals Were     Pulse Height Last Period Pulse Oximetry BMI (Body Mass Index)       79 1.645 m (5' 4.75\") 05/25/2017 99% 35.52 kg/m2        Blood Pressure from Last 3 Encounters:   06/14/17 119/79   05/25/17 114/78   12/30/16 134/78    Weight from Last 3 Encounters:   06/14/17 96.1 kg (211 lb 12.8 oz)   05/25/17 96.1 kg (211 lb 12.8 oz)   12/30/16 102.5 kg (225 lb 14.4 oz)              We Performed the Following     STRAPPING ANKLE/FOOT        Primary Care Provider Office Phone # Fax #    Jaz SHAHRAM Robb [facility] [phone]       [facility] [address]  [place] [address]        Thank you!     Thank you for choosing [facility]  for your care. Our goal is always to provide you with excellent care. Hearing back from our patients is one way we can continue to improve our services. Please take a few minutes to complete the written survey that you may receive in the mail after your visit with us. Thank you!             Your Updated Medication List - Protect others around you: Learn how to safely use, store and throw away your medicines at www.disposemymeds.org.          This list is accurate as of: 6/14/17 11:59 PM.  Always use your most recent med list.                   Brand Name Dispense Instructions for use    hydrochlorothiazide 25 MG tablet    HYDRODIURIL    90 tablet    TAKE 1 TABLET BY MOUTH ONCE DAILY       HYDROcodone-acetaminophen 5-325 MG per tablet    NORCO    15 tablet    Take 1 tablet by mouth every 6 hours as needed for pain       phentermine 15 MG capsule      Take 15 mg by mouth every morning       " QUETIAPINE FUMARATE PO      Take 50 mg by mouth every 6 hours as needed       topiramate 25 MG tablet    TOPAMAX     Take 25 mg by mouth 5 tablets at bedtime       valACYclovir 500 MG tablet    VALTREX    90 tablet    Take 1 tablet (500 mg) by mouth daily

## 2017-06-14 NOTE — PROGRESS NOTES
Past Medical History:   Diagnosis Date     Abnormal Pap smear     20 years ago and had coloposcopy and froze     Chronic headache      DDD (degenerative disc disease), cervical 6/13/2014     DDD (degenerative disc disease), lumbar 11/25/2013     Depressive disorder, not elsewhere classified 1993    limited episodes in 1990s, no clinically significant depression since then     Headache(784.0) 1983     Herpes simplex without mention of complication      Hypertension Jan 2014     Lumbago 1988    ongoing problem since 18 years old     Migraine      Obesity, unspecified     always a larger child, went on to have Katie en Y 1999, with BMI > 40 at time of surgery     Ovarian cyst      Patient Active Problem List   Diagnosis     Insomnia     Bariatric surgery status     Chronic headache     Recurrent herpes simplex     CARDIOVASCULAR SCREENING; LDL GOAL LESS THAN 160     Obesity, Class I, BMI 30-34.9     Adjustment disorder with anxiety     Pyelonephritis     Advanced directives, counseling/discussion     HTN, goal below 140/90     Rotator cuff disorder     S/P rotator cuff repair     Rotator cuff (capsule) sprain     Rotator cuff tear     DDD (degenerative disc disease), lumbar     DDD (degenerative disc disease), cervical     Abnormal TSH     Past Surgical History:   Procedure Laterality Date     ARTHROSCOPIC RECONSTRUCTION ANTERIOR AND POSTERIOR CRUCIATE LIGAMENT, COMBINED       BACK SURGERY  2014    steroid injections     BACK SURGERY  2014    steroid injections     BIOPSY  1991 & 1992    left ear & left breast     BREAST SURGERY  1993    lump removal     BREAST SURGERY  1992    lump removal     COLONOSCOPY  1/22/2013    Procedure: COLONOSCOPY;  colonoscopy, loose stools, blood in stool;  Surgeon: Kamari Solomon MD;  Location: [facility] OR     CRYOTHERAPY  20 years ago     DILATION AND CURETTAGE SUCTION  2/24/2012    Procedure:DILATION AND CURETTAGE SUCTION; DILATION, CURETTAGE AND SUCTION ; Surgeon:JOHN HORNER  MALACHI; Location:[facility]     ENT SURGERY  1974    tonsils removed     LAPAROSCOPIC BYPASS GASTRIC       lump removed from left ear[       SURGICAL HISTORY OF -   1989    lt knee acl reconstruction     SURGICAL HISTORY OF -   2001    lt breast lump removal     SURGICAL HISTORY OF -   1999    gastric bypass (Katie en Y) [facility]     SURGICAL HISTORY OF -   1974    tonsillectomy     SURGICAL HISTORY OF -   2005    LASIK     Social History     Social History     Marital status:      Spouse name: Paolo ( in 2007)     Number of children: o     Years of education: 16     Occupational History     insurance brokerage agent [employer]     Social History Main Topics     Smoking status: Never Smoker     Smokeless tobacco: Never Used      Comment: EX- SMOKED IN HOUSE FOR PAST 10 YEARS     Alcohol use No     Drug use: No     Sexual activity: Yes     Partners: Male     Birth control/ protection: Pill     Other Topics Concern     Blood Transfusions No     Sleep Concern Yes     Stress Concern Yes     Weight Concern Yes     Special Diet No     Back Care Yes     LOW BACK PAIN, UPPER BACK PAIN WITH NUMBNESS IN HANDS     Exercise Yes     2 X WEEKLY     Seat Belt Yes     Self-Exams Yes     Parent/Sibling W/ Cabg, Mi Or Angioplasty Before 65f 55m? Yes     Father     Social History Narrative     Family History   Problem Relation Age of Onset     Lipids Mother      Hypertension Mother      Anesthesia Reaction Mother      C.A.D. Father      first MI at 40 years old (was a smoker)     Lipids Father      HEART DISEASE Father      Hypertension Father      C.A.D. Maternal Grandmother      not premature presentation     CANCER Maternal Grandmother      skin     C.A.D. Paternal Grandmother      not premature presentation     DIABETES Paternal Grandmother      Hypertension Paternal Grandmother      Anxiety Disorder Brother      MENTAL ILLNESS Other      Breast Cancer No family hx of      Cancer - colorectal No  family hx of      SUBJECTIVE FINDINGS:  This 47-year-old female presents from Jaz Torres NP, for right foot pain.  She relates it started on the lateral foot and moved to the top of the foot and moved to the medial first ray.  Now it is kind of back to the top of the foot.  She relates it is a shooting pain at times when she is just sitting.  She relates in the morning when she first gets up and steps on it, it hurts.  She has had chiropractic adjustments and that sometimes will completely relieve the pain from 1-2 hours and then she steps on it wrong and it hurts again.  Relates no injuries.  It started about 3 months, it just started.       OBJECTIVE FINDINGS:  DP and PT are 2/4 right.  She has functional hallux limitus with a mild dorsal medial first MPJ prominence, right foot.  She has pain on palpation of the tarsometatarsal joint, right foot.  There is no erythema, no drainage, no odor, no calor, no tendon voids on the right foot.  There is no palpable click or shooting pain in the interspaces.  X-rays reviewed with the patient in clinic today.  She has some irregularity of the second tarsometatarsal joint.       ASSESSMENT AND PLAN:  Right foot pain.  She has functional hallux limitus present.  She has some osteoarthritic changes in the right second tarsometatarsal joint on x-ray.  Diagnosis and treatment options discussed with the patient.  Removal of taping and strapping right foot done upon consent.  Patient instructed on its use.  Advised her on stretching and ice and she will return to clinic to see me in 1 week.

## 2017-06-14 NOTE — NURSING NOTE
"Pita Maher's goals for this visit include: Consult for foot pain  She requests these members of her care team be copied on today's visit information: yes    PCP: Jaz Torres    Referring Provider:  SHAHRAM Ambriz [facility]  [address]  [place] [address]    Chief Complaint   Patient presents with     Consult     Musculoskeletal Problem     Right foot pain x3 months. no injury       Initial /79  Pulse 79  Ht 1.645 m (5' 4.75\")  Wt 96.1 kg (211 lb 12.8 oz)  LMP 05/25/2017  SpO2 99%  BMI 35.52 kg/m2 Estimated body mass index is 35.52 kg/(m^2) as calculated from the following:    Height as of this encounter: 1.645 m (5' 4.75\").    Weight as of this encounter: 96.1 kg (211 lb 12.8 oz).  Medication Reconciliation: complete    "

## 2017-06-15 ENCOUNTER — INPATIENT (INPATIENT)
Facility: HOSPITAL | Age: 47
LOS: 0 days | Discharge: HOME | DRG: 69 | End: 2017-06-16
Attending: PSYCHIATRY & NEUROLOGY | Admitting: PSYCHIATRY & NEUROLOGY
Payer: COMMERCIAL

## 2017-06-15 VITALS
SYSTOLIC BLOOD PRESSURE: 104 MMHG | DIASTOLIC BLOOD PRESSURE: 87 MMHG | RESPIRATION RATE: 18 BRPM | OXYGEN SATURATION: 98 % | HEART RATE: 78 BPM | TEMPERATURE: 98 F

## 2017-06-15 DIAGNOSIS — R53.1 WEAKNESS: ICD-10-CM

## 2017-06-15 LAB
ALBUMIN SERPL ELPH-MCNC: 4.3 G/DL — SIGNIFICANT CHANGE UP (ref 3.3–5)
ALP SERPL-CCNC: 66 U/L — SIGNIFICANT CHANGE UP (ref 40–120)
ALT FLD-CCNC: 13 U/L RC — SIGNIFICANT CHANGE UP (ref 10–45)
ANION GAP SERPL CALC-SCNC: 14 MMOL/L — SIGNIFICANT CHANGE UP (ref 5–17)
APTT BLD: 32.7 SEC — SIGNIFICANT CHANGE UP (ref 27.5–37.4)
AST SERPL-CCNC: 15 U/L — SIGNIFICANT CHANGE UP (ref 10–40)
BASE EXCESS BLDV CALC-SCNC: 1.3 MMOL/L — SIGNIFICANT CHANGE UP (ref -2–2)
BASOPHILS # BLD AUTO: 0.1 K/UL — SIGNIFICANT CHANGE UP (ref 0–0.2)
BASOPHILS NFR BLD AUTO: 0.8 % — SIGNIFICANT CHANGE UP (ref 0–2)
BILIRUB SERPL-MCNC: 0.2 MG/DL — SIGNIFICANT CHANGE UP (ref 0.2–1.2)
BUN SERPL-MCNC: 19 MG/DL — SIGNIFICANT CHANGE UP (ref 7–23)
CA-I SERPL-SCNC: 1.25 MMOL/L — SIGNIFICANT CHANGE UP (ref 1.12–1.3)
CALCIUM SERPL-MCNC: 9 MG/DL — SIGNIFICANT CHANGE UP (ref 8.4–10.5)
CHLORIDE BLDV-SCNC: 103 MMOL/L — SIGNIFICANT CHANGE UP (ref 96–108)
CHLORIDE SERPL-SCNC: 104 MMOL/L — SIGNIFICANT CHANGE UP (ref 96–108)
CO2 BLDV-SCNC: 29 MMOL/L — SIGNIFICANT CHANGE UP (ref 22–30)
CO2 SERPL-SCNC: 24 MMOL/L — SIGNIFICANT CHANGE UP (ref 22–31)
CREAT SERPL-MCNC: 0.79 MG/DL — SIGNIFICANT CHANGE UP (ref 0.5–1.3)
EOSINOPHIL # BLD AUTO: 0.1 K/UL — SIGNIFICANT CHANGE UP (ref 0–0.5)
EOSINOPHIL NFR BLD AUTO: 1.1 % — SIGNIFICANT CHANGE UP (ref 0–6)
GAS PNL BLDV: 140 MMOL/L — SIGNIFICANT CHANGE UP (ref 136–145)
GAS PNL BLDV: SIGNIFICANT CHANGE UP
GLUCOSE BLDV-MCNC: 57 MG/DL — LOW (ref 70–99)
GLUCOSE SERPL-MCNC: 60 MG/DL — LOW (ref 70–99)
HCO3 BLDV-SCNC: 27 MMOL/L — SIGNIFICANT CHANGE UP (ref 21–29)
HCT VFR BLD CALC: 40.8 % — SIGNIFICANT CHANGE UP (ref 34.5–45)
HCT VFR BLDA CALC: 41 % — SIGNIFICANT CHANGE UP (ref 39–50)
HGB BLD CALC-MCNC: 13.3 G/DL — SIGNIFICANT CHANGE UP (ref 11.5–15.5)
HGB BLD-MCNC: 13.7 G/DL — SIGNIFICANT CHANGE UP (ref 11.5–15.5)
INR BLD: 1 RATIO — SIGNIFICANT CHANGE UP (ref 0.88–1.16)
LACTATE BLDV-MCNC: 1.2 MMOL/L — SIGNIFICANT CHANGE UP (ref 0.7–2)
LYMPHOCYTES # BLD AUTO: 2.2 K/UL — SIGNIFICANT CHANGE UP (ref 1–3.3)
LYMPHOCYTES # BLD AUTO: 32.7 % — SIGNIFICANT CHANGE UP (ref 13–44)
MCHC RBC-ENTMCNC: 31.8 PG — SIGNIFICANT CHANGE UP (ref 27–34)
MCHC RBC-ENTMCNC: 33.4 GM/DL — SIGNIFICANT CHANGE UP (ref 32–36)
MCV RBC AUTO: 95 FL — SIGNIFICANT CHANGE UP (ref 80–100)
MONOCYTES # BLD AUTO: 0.5 K/UL — SIGNIFICANT CHANGE UP (ref 0–0.9)
MONOCYTES NFR BLD AUTO: 7.7 % — SIGNIFICANT CHANGE UP (ref 2–14)
NEUTROPHILS # BLD AUTO: 4 K/UL — SIGNIFICANT CHANGE UP (ref 1.8–7.4)
NEUTROPHILS NFR BLD AUTO: 57.8 % — SIGNIFICANT CHANGE UP (ref 43–77)
PCO2 BLDV: 52 MMHG — HIGH (ref 35–50)
PH BLDV: 7.34 — LOW (ref 7.35–7.45)
PLATELET # BLD AUTO: 236 K/UL — SIGNIFICANT CHANGE UP (ref 150–400)
PO2 BLDV: 26 MMHG — SIGNIFICANT CHANGE UP (ref 25–45)
POTASSIUM BLDV-SCNC: 3.7 MMOL/L — SIGNIFICANT CHANGE UP (ref 3.5–5)
POTASSIUM SERPL-MCNC: 4 MMOL/L — SIGNIFICANT CHANGE UP (ref 3.5–5.3)
POTASSIUM SERPL-SCNC: 4 MMOL/L — SIGNIFICANT CHANGE UP (ref 3.5–5.3)
PROT SERPL-MCNC: 7.1 G/DL — SIGNIFICANT CHANGE UP (ref 6–8.3)
PROTHROM AB SERPL-ACNC: 10.8 SEC — SIGNIFICANT CHANGE UP (ref 9.8–12.7)
RBC # BLD: 4.3 M/UL — SIGNIFICANT CHANGE UP (ref 3.8–5.2)
RBC # FLD: 11.6 % — SIGNIFICANT CHANGE UP (ref 10.3–14.5)
SAO2 % BLDV: 40 % — LOW (ref 67–88)
SODIUM SERPL-SCNC: 142 MMOL/L — SIGNIFICANT CHANGE UP (ref 135–145)
WBC # BLD: 6.9 K/UL — SIGNIFICANT CHANGE UP (ref 3.8–10.5)
WBC # FLD AUTO: 6.9 K/UL — SIGNIFICANT CHANGE UP (ref 3.8–10.5)

## 2017-06-15 PROCEDURE — 70553 MRI BRAIN STEM W/O & W/DYE: CPT | Mod: 26

## 2017-06-15 PROCEDURE — 70450 CT HEAD/BRAIN W/O DYE: CPT | Mod: 26

## 2017-06-15 PROCEDURE — 99285 EMERGENCY DEPT VISIT HI MDM: CPT | Mod: 25

## 2017-06-15 PROCEDURE — 93010 ELECTROCARDIOGRAM REPORT: CPT

## 2017-06-15 PROCEDURE — 70548 MR ANGIOGRAPHY NECK W/DYE: CPT | Mod: 26

## 2017-06-15 PROCEDURE — 93306 TTE W/DOPPLER COMPLETE: CPT | Mod: 26

## 2017-06-15 RX ORDER — CLONAZEPAM 1 MG
2 TABLET ORAL
Qty: 0 | Refills: 0 | Status: DISCONTINUED | OUTPATIENT
Start: 2017-06-15 | End: 2017-06-16

## 2017-06-15 RX ORDER — BUPROPION HYDROCHLORIDE 150 MG/1
300 TABLET, EXTENDED RELEASE ORAL DAILY
Qty: 0 | Refills: 0 | Status: DISCONTINUED | OUTPATIENT
Start: 2017-06-15 | End: 2017-06-16

## 2017-06-15 RX ORDER — DEXTROSE 50 % IN WATER 50 %
50 SYRINGE (ML) INTRAVENOUS ONCE
Qty: 0 | Refills: 0 | Status: COMPLETED | OUTPATIENT
Start: 2017-06-15 | End: 2017-06-15

## 2017-06-15 RX ORDER — ASPIRIN/CALCIUM CARB/MAGNESIUM 324 MG
81 TABLET ORAL DAILY
Qty: 0 | Refills: 0 | Status: DISCONTINUED | OUTPATIENT
Start: 2017-06-15 | End: 2017-06-16

## 2017-06-15 RX ORDER — ENOXAPARIN SODIUM 100 MG/ML
40 INJECTION SUBCUTANEOUS EVERY 24 HOURS
Qty: 0 | Refills: 0 | Status: DISCONTINUED | OUTPATIENT
Start: 2017-06-15 | End: 2017-06-16

## 2017-06-15 RX ORDER — GABAPENTIN 400 MG/1
300 CAPSULE ORAL
Qty: 0 | Refills: 0 | Status: DISCONTINUED | OUTPATIENT
Start: 2017-06-15 | End: 2017-06-16

## 2017-06-15 RX ORDER — ATORVASTATIN CALCIUM 80 MG/1
80 TABLET, FILM COATED ORAL AT BEDTIME
Qty: 0 | Refills: 0 | Status: DISCONTINUED | OUTPATIENT
Start: 2017-06-15 | End: 2017-06-16

## 2017-06-15 RX ORDER — LAMOTRIGINE 25 MG/1
100 TABLET, ORALLY DISINTEGRATING ORAL
Qty: 0 | Refills: 0 | Status: DISCONTINUED | OUTPATIENT
Start: 2017-06-15 | End: 2017-06-16

## 2017-06-15 RX ADMIN — GABAPENTIN 300 MILLIGRAM(S): 400 CAPSULE ORAL at 21:50

## 2017-06-15 RX ADMIN — ATORVASTATIN CALCIUM 80 MILLIGRAM(S): 80 TABLET, FILM COATED ORAL at 21:50

## 2017-06-15 RX ADMIN — LAMOTRIGINE 100 MILLIGRAM(S): 25 TABLET, ORALLY DISINTEGRATING ORAL at 21:51

## 2017-06-15 RX ADMIN — Medication 2 MILLIGRAM(S): at 21:50

## 2017-06-15 RX ADMIN — ENOXAPARIN SODIUM 40 MILLIGRAM(S): 100 INJECTION SUBCUTANEOUS at 21:49

## 2017-06-15 RX ADMIN — Medication 50 MILLILITER(S): at 10:05

## 2017-06-15 NOTE — ED ADULT NURSE REASSESSMENT NOTE - NS ED NURSE REASSESS COMMENT FT1
Pt neuro intact. Gait is still unsteady, pt stated that is her baseline. Pt stated she has a history of depression/anxiety. When she was driving, her son stated he was worried she was having a stroke. Pt currently waiting for MRI. Pt going to be evaluated by psych. No suicide ideations, no homicide ideations. No auditory/tactile hallucinations. VSS. Will continue to monitor.

## 2017-06-15 NOTE — CONSULT NOTE ADULT - PROBLEM SELECTOR RECOMMENDATION 9
-MRI brain without contrast  -MRA head without contrast  -MRA neck with contrast  -Psychiatry consult  -Neurochecks  -Telemetry monitoring

## 2017-06-15 NOTE — CONSULT NOTE ADULT - ASSESSMENT
The patient is a 47 year old female with a pmh significant for manic depression, and anxiety that presented as a code stroke with left sided weakness. tPA not given as patient had mild deficitis, which were rapidly improving. Patient endorses significant social stressors including separation from her , and difficulties at work. At this time, less likely to be concerned for an ischemic stroke, likely strong psychogenic component.

## 2017-06-15 NOTE — H&P ADULT - NSHPPHYSICALEXAM_GEN_ALL_CORE
Neurological Exam:  Mental Status: Orientated to self, date and place.  Attention intact.  No dysarthria, aphasia or neglect.        Cranial Nerves: CN I - not tested.  PERRL, EOMI, VFF, CN V1-3 intact to light touch and pinprick.  No facial asymmetry.  Hearing intact to finger rub bilaterally.  Tongue, uvula and palate midline.  Sternocleidomastoid and Trapezius intact bilaterally.    Motor:   Tone: normal.                  Strength: intact throughout  Pronator drift: LUE drift, B/L lower extremity drift         Dysmeria: None to finger-nose-finger  Tremor: None    Sensation: intact to light touch throughout    Deep Tendon Reflexes: 2+ bilateral biceps, triceps, brachioradialis, knee and ankle  Toes flexor bilaterally    Gait: Sways to left on gait exam

## 2017-06-15 NOTE — H&P ADULT - ATTENDING COMMENTS
I have seen and examined this patient with the stroke neurology team.     History was reviewed with the patient and available family members.   ROS: All negative except documented in the HPI.   Neurological exam was performed and agree with exam as documented above.   Laboratory results and imaging studies were reviewed by me.   I agree with the neurology resident note as documented above.    48 Y/O woman with no known vascular risk factors admitted to [facility] for evaluation of an episode concerning for stroke. She reports to have acute onset of confusion, disorientation, imbalance in the form of stumbling all over, dysarthria and possible L facial droop. She reports to have history of migraines but denies any headaches yesterday. Her neurological exam this morning is non focal. MRI brain did not show any acute intracranial pathology or acute stroke. MRA H/N did not show symptomatic intracranial or extracranial large vessel severe stenosis or occlusion. Impression: Transient right hemispheric dysfunction – likely etiology being late life migraine accompaniment vs. TIA involving the R MCA distribution. Plan: Aspirin and clopidogrel for 3 weeks followed by aspirin for aggressive secondary stroke prevention. Atorvastatin 80 mg q HS. HbA1C and LDL. Hypercoagulable work up. I had a long discussion with the patient and her family members regarding the cardiac work up for TIA to be performed as inpatient vs. outpatient but they preferred to be discharged from the hospital. TTE with bubble study and 30 days event monitor to be performed as outpatient thought her cardiologist. Agree with aggressive vascular risk factors modifications. Follow up in the office.     Above mentioned plan was discussed with patient and her family members at bedside in detail. All the questions were answered and concerns were addressed.

## 2017-06-15 NOTE — ED ADULT NURSE NOTE - CHIEF COMPLAINT
The patient is a 47y Female complaining of facial drop. The patient is a 47y Female complaining of slurred speech.
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
## 2017-06-15 NOTE — ED ADULT NURSE NOTE - CHPI ED SYMPTOMS NEG
no dizziness/no weakness/no loss of consciousness/no fever/no blurred vision/no nausea/no numbness/no vomiting
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
## 2017-06-15 NOTE — ED ADULT NURSE REASSESSMENT NOTE - NS ED NURSE REASSESS COMMENT FT1
CDU arrived and evaluated patient. CDU will not be taking pt. MD contacted Neuro for admission. Will continue to monitor.
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
## 2017-06-15 NOTE — ED PROVIDER NOTE - PROGRESS NOTE DETAILS
[facility] PGY3: Paged psychiatry to see pt at approximately 1000, no callback. Paged again at 1115, no callback

## 2017-06-15 NOTE — ED PROVIDER NOTE - MEDICAL DECISION MAKING DETAILS
Code stroke called as pt with new neuro sx within 3 hours, +LUE weakness, difficulty walking on initial eval although resolved on resident and neuro exam. Will obtain CT head, labs, neuro c/s Code stroke called as pt with new neuro sx within 3 hours, +LUE weakness, difficulty walking on initial eval although resolved on resident and neuro exam. Will obtain CT head, labs, neuro c/s  Moshe Alcantara DO; see attending attestation

## 2017-06-15 NOTE — ED PROVIDER NOTE - ATTENDING CONTRIBUTION TO CARE
47yoF with psychiatry history brought to ED by coworker for weakness, slurred speech, not acting appropriate, ataxic. States has multiple stressors currently.  On exam, pt with generalized weakness. L>R  weakness. Ataxic gait to both directions. Fails finger to nose.   FS 59-d50->117 no improvement in symptoms  A: Weakness, unlikely CVA, more likely psychiatric/conversion, or benzo (takes clonopin)  P: Stroke code called as precaution. CTB. neuro consult. labs. ekg.

## 2017-06-15 NOTE — H&P ADULT - PROBLEM SELECTOR PLAN 1
-MRI brain without contrast  -MRA head without contrast  -MRA neck with contrast  -Psychiatry consult  -Neurochecks  -Telemetry monitoring.  -Will start ASA/Lipitor  -Hba1c/lipid panel  -Continue psyche meds  -Consider psyche consult
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
## 2017-06-16 VITALS — HEART RATE: 80 BPM | SYSTOLIC BLOOD PRESSURE: 107 MMHG | OXYGEN SATURATION: 98 % | DIASTOLIC BLOOD PRESSURE: 69 MMHG

## 2017-06-16 LAB
ANION GAP SERPL CALC-SCNC: 14 MMOL/L — SIGNIFICANT CHANGE UP (ref 5–17)
BASOPHILS # BLD AUTO: 0.04 K/UL — SIGNIFICANT CHANGE UP (ref 0–0.2)
BASOPHILS NFR BLD AUTO: 0.7 % — SIGNIFICANT CHANGE UP (ref 0–2)
BUN SERPL-MCNC: 15 MG/DL — SIGNIFICANT CHANGE UP (ref 7–23)
CALCIUM SERPL-MCNC: 8.8 MG/DL — SIGNIFICANT CHANGE UP (ref 8.4–10.5)
CHLORIDE SERPL-SCNC: 102 MMOL/L — SIGNIFICANT CHANGE UP (ref 96–108)
CHOLEST SERPL-MCNC: 175 MG/DL — SIGNIFICANT CHANGE UP (ref 10–199)
CO2 SERPL-SCNC: 20 MMOL/L — LOW (ref 22–31)
CREAT SERPL-MCNC: 0.74 MG/DL — SIGNIFICANT CHANGE UP (ref 0.5–1.3)
EOSINOPHIL # BLD AUTO: 0.09 K/UL — SIGNIFICANT CHANGE UP (ref 0–0.5)
EOSINOPHIL NFR BLD AUTO: 1.5 % — SIGNIFICANT CHANGE UP (ref 0–6)
GLUCOSE SERPL-MCNC: 102 MG/DL — HIGH (ref 70–99)
HBA1C BLD-MCNC: 5.1 % — SIGNIFICANT CHANGE UP (ref 4–5.6)
HCT VFR BLD CALC: 39.2 % — SIGNIFICANT CHANGE UP (ref 34.5–45)
HCYS SERPL-MCNC: 7 UMOL/L — SIGNIFICANT CHANGE UP (ref 5–15)
HDLC SERPL-MCNC: 49 MG/DL — SIGNIFICANT CHANGE UP (ref 40–125)
HGB BLD-MCNC: 12.8 G/DL — SIGNIFICANT CHANGE UP (ref 11.5–15.5)
IMM GRANULOCYTES NFR BLD AUTO: 0.2 % — SIGNIFICANT CHANGE UP (ref 0–1.5)
LIPID PNL WITH DIRECT LDL SERPL: 102 MG/DL — SIGNIFICANT CHANGE UP
LYMPHOCYTES # BLD AUTO: 2.37 K/UL — SIGNIFICANT CHANGE UP (ref 1–3.3)
LYMPHOCYTES # BLD AUTO: 40.4 % — SIGNIFICANT CHANGE UP (ref 13–44)
MCHC RBC-ENTMCNC: 30 PG — SIGNIFICANT CHANGE UP (ref 27–34)
MCHC RBC-ENTMCNC: 32.7 GM/DL — SIGNIFICANT CHANGE UP (ref 32–36)
MCV RBC AUTO: 91.8 FL — SIGNIFICANT CHANGE UP (ref 80–100)
MONOCYTES # BLD AUTO: 0.45 K/UL — SIGNIFICANT CHANGE UP (ref 0–0.9)
MONOCYTES NFR BLD AUTO: 7.7 % — SIGNIFICANT CHANGE UP (ref 2–14)
NEUTROPHILS # BLD AUTO: 2.9 K/UL — SIGNIFICANT CHANGE UP (ref 1.8–7.4)
NEUTROPHILS NFR BLD AUTO: 49.5 % — SIGNIFICANT CHANGE UP (ref 43–77)
PLATELET # BLD AUTO: 242 K/UL — SIGNIFICANT CHANGE UP (ref 150–400)
POTASSIUM SERPL-MCNC: 3.9 MMOL/L — SIGNIFICANT CHANGE UP (ref 3.5–5.3)
POTASSIUM SERPL-SCNC: 3.9 MMOL/L — SIGNIFICANT CHANGE UP (ref 3.5–5.3)
RBC # BLD: 4.27 M/UL — SIGNIFICANT CHANGE UP (ref 3.8–5.2)
RBC # FLD: 13.1 % — SIGNIFICANT CHANGE UP (ref 10.3–14.5)
SODIUM SERPL-SCNC: 136 MMOL/L — SIGNIFICANT CHANGE UP (ref 135–145)
TOTAL CHOLESTEROL/HDL RATIO MEASUREMENT: 3.6 RATIO — SIGNIFICANT CHANGE UP (ref 3.3–7.1)
TRIGL SERPL-MCNC: 120 MG/DL — SIGNIFICANT CHANGE UP (ref 10–149)
WBC # BLD: 5.86 K/UL — SIGNIFICANT CHANGE UP (ref 3.8–10.5)
WBC # FLD AUTO: 5.86 K/UL — SIGNIFICANT CHANGE UP (ref 3.8–10.5)

## 2017-06-16 PROCEDURE — 83605 ASSAY OF LACTIC ACID: CPT

## 2017-06-16 PROCEDURE — 84132 ASSAY OF SERUM POTASSIUM: CPT

## 2017-06-16 PROCEDURE — 83036 HEMOGLOBIN GLYCOSYLATED A1C: CPT

## 2017-06-16 PROCEDURE — 70553 MRI BRAIN STEM W/O & W/DYE: CPT

## 2017-06-16 PROCEDURE — 70544 MR ANGIOGRAPHY HEAD W/O DYE: CPT

## 2017-06-16 PROCEDURE — 85027 COMPLETE CBC AUTOMATED: CPT

## 2017-06-16 PROCEDURE — 83090 ASSAY OF HOMOCYSTEINE: CPT

## 2017-06-16 PROCEDURE — 82330 ASSAY OF CALCIUM: CPT

## 2017-06-16 PROCEDURE — 85014 HEMATOCRIT: CPT

## 2017-06-16 PROCEDURE — 96374 THER/PROPH/DIAG INJ IV PUSH: CPT

## 2017-06-16 PROCEDURE — 85307 ASSAY ACTIVATED PROTEIN C: CPT

## 2017-06-16 PROCEDURE — 70548 MR ANGIOGRAPHY NECK W/DYE: CPT

## 2017-06-16 PROCEDURE — 86146 BETA-2 GLYCOPROTEIN ANTIBODY: CPT

## 2017-06-16 PROCEDURE — 85730 THROMBOPLASTIN TIME PARTIAL: CPT

## 2017-06-16 PROCEDURE — 80048 BASIC METABOLIC PNL TOTAL CA: CPT

## 2017-06-16 PROCEDURE — 84295 ASSAY OF SERUM SODIUM: CPT

## 2017-06-16 PROCEDURE — 80061 LIPID PANEL: CPT

## 2017-06-16 PROCEDURE — 85306 CLOT INHIBIT PROT S FREE: CPT

## 2017-06-16 PROCEDURE — 99254 IP/OBS CNSLTJ NEW/EST MOD 60: CPT | Mod: GC

## 2017-06-16 PROCEDURE — 82962 GLUCOSE BLOOD TEST: CPT | Mod: 91

## 2017-06-16 PROCEDURE — 86147 CARDIOLIPIN ANTIBODY EA IG: CPT

## 2017-06-16 PROCEDURE — 85301 ANTITHROMBIN III ANTIGEN: CPT

## 2017-06-16 PROCEDURE — 80053 COMPREHEN METABOLIC PANEL: CPT

## 2017-06-16 PROCEDURE — 99285 EMERGENCY DEPT VISIT HI MDM: CPT | Mod: 25

## 2017-06-16 PROCEDURE — 82947 ASSAY GLUCOSE BLOOD QUANT: CPT

## 2017-06-16 PROCEDURE — 85300 ANTITHROMBIN III ACTIVITY: CPT

## 2017-06-16 PROCEDURE — 82435 ASSAY OF BLOOD CHLORIDE: CPT

## 2017-06-16 PROCEDURE — 85610 PROTHROMBIN TIME: CPT

## 2017-06-16 PROCEDURE — 93306 TTE W/DOPPLER COMPLETE: CPT

## 2017-06-16 PROCEDURE — 85303 CLOT INHIBIT PROT C ACTIVITY: CPT

## 2017-06-16 PROCEDURE — 82803 BLOOD GASES ANY COMBINATION: CPT

## 2017-06-16 PROCEDURE — A9585: CPT

## 2017-06-16 PROCEDURE — 93005 ELECTROCARDIOGRAM TRACING: CPT

## 2017-06-16 PROCEDURE — 97165 OT EVAL LOW COMPLEX 30 MIN: CPT

## 2017-06-16 PROCEDURE — 70450 CT HEAD/BRAIN W/O DYE: CPT

## 2017-06-16 RX ORDER — ATORVASTATIN CALCIUM 80 MG/1
1 TABLET, FILM COATED ORAL
Qty: 30 | Refills: 0 | OUTPATIENT
Start: 2017-06-16

## 2017-06-16 RX ORDER — ASPIRIN/CALCIUM CARB/MAGNESIUM 324 MG
1 TABLET ORAL
Qty: 0 | Refills: 0 | COMMUNITY
Start: 2017-06-16

## 2017-06-16 RX ORDER — ATORVASTATIN CALCIUM 80 MG/1
1 TABLET, FILM COATED ORAL
Qty: 0 | Refills: 0 | COMMUNITY
Start: 2017-06-16

## 2017-06-16 RX ORDER — CLOPIDOGREL BISULFATE 75 MG/1
1 TABLET, FILM COATED ORAL
Qty: 30 | Refills: 0 | OUTPATIENT
Start: 2017-06-16 | End: 2017-07-16

## 2017-06-16 RX ADMIN — Medication 2 MILLIGRAM(S): at 05:51

## 2017-06-16 RX ADMIN — GABAPENTIN 300 MILLIGRAM(S): 400 CAPSULE ORAL at 05:51

## 2017-06-16 RX ADMIN — BUPROPION HYDROCHLORIDE 300 MILLIGRAM(S): 150 TABLET, EXTENDED RELEASE ORAL at 05:52

## 2017-06-16 RX ADMIN — LAMOTRIGINE 100 MILLIGRAM(S): 25 TABLET, ORALLY DISINTEGRATING ORAL at 05:52

## 2017-06-16 RX ADMIN — Medication 81 MILLIGRAM(S): at 12:14

## 2017-06-16 NOTE — DISCHARGE NOTE ADULT - HOSPITAL COURSE
The patient is a 47 year old female with a PMH of Manic depression, and anxiety disorder presents as a code stroke with complaints of left sided weakness. As per the patient she was in her usual state of health and driving her son to school when she started veering towards the left. The patient's son became concerned and also noted that her speech sounded slurred. She then woke up and drove to [employer] and met a co-worker who became concerned that she may be having a stroke and brought her to the ER. NIHSS: 3, MRS: 0    Of note, patient states that she feels exhausted and went to an event last night where she felt overheated. Thinks her symptoms may be secondary to exhaustion.     Patient was admitted for further work up.  Brain MRI: No evidence for acute infarct or acute hemorrhage.  Brain MRA: No evidence for intracranial stenosis.  Neck MRA: No evidence for carotid or vertebral artery stenosis.  Differential diagnosis includes TIA vs migraine with aura.     Patient to have TTE done outpatient with cardiology follow up.   Hypercoagulable panel ordered and to be followed outpatient with vascular neurologist, Dr. Townsend.  She is safe to be discharged on ASA 81 and Plavix 75 for 3 weeks followed by ASA alone.

## 2017-06-16 NOTE — DISCHARGE NOTE ADULT - NS AS DC STROKE ED MATERIALS
Stroke Education Booklet/Risk Factors for Stroke/Call 911 for Stroke/Stroke Warning Signs and Symptoms/Prescribed Medications/Need for Followup After Discharge

## 2017-06-16 NOTE — DISCHARGE NOTE ADULT - PROVIDER TOKENS
FREE:[LAST:[Kristian],FIRST:[Maurilio],PHONE:[phone]],FAX:[phone]],ADDRESS:[Maurilio Townsend ([facility]), Neurology; Vascular Neurology  [address] [place] [address]]],TOKEN:'[id number]'

## 2017-06-16 NOTE — DISCHARGE NOTE ADULT - PLAN OF CARE
Get INR result from today. Prevention of recurrence Follow up with vascular neurology, Dr. Townsend  Follow up with Cardiologist for TTE  Take Aspirin 81mg and Plavix 75mg for 3 weeks followed by Aspirin 81mg alone

## 2017-06-16 NOTE — DISCHARGE NOTE ADULT - PATIENT PORTAL LINK FT
“You can access the FollowHealth Patient Portal, offered by [facility], by registering with the following website: [email or web address]”

## 2017-06-16 NOTE — OCCUPATIONAL THERAPY INITIAL EVALUATION ADULT - LIVES WITH, PROFILE
children/spouse/Lives in apartment on 2nd floor with  and 17yr old son, 5 steps to enter +bilateral handrail, 13 steps to apartment +1 handrail

## 2017-06-16 NOTE — DISCHARGE NOTE ADULT - CARE PLAN
Principal Discharge DX:	TIA (transient ischemic attack)  Goal:	Prevention of recurrence  Instructions for follow-up, activity and diet:	Follow up with vascular neurology, Dr. Townsend  Follow up with Cardiologist for TTE  Take Aspirin 81mg and Plavix 75mg for 3 weeks followed by Aspirin 81mg alone
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
## 2017-06-16 NOTE — DISCHARGE NOTE ADULT - CARE PROVIDER_API CALL
Maurilio Townsend Anand ([facility]), Neurology; Vascular Neurology  [address] [place] [address]  Phone: [phone]  Fax: [phone]    Romaine Monet (DO), Cardiology; Internal Medicine  [address] [place] [address] [address]  [place] [address]  Phone: [phone]  Fax: [phone]

## 2017-06-16 NOTE — DISCHARGE NOTE ADULT - MEDICATION SUMMARY - MEDICATIONS TO TAKE
I will START or STAY ON the medications listed below when I get home from the hospital:    one a day with probiotics  -- 1 tab(s) by mouth once a day  -- Indication: For probiotics    menthol patch  -- 1 patch by transdermal patch , As Needed  -- Indication: For Smoking cessation     Aleve 220 mg oral tablet  -- 3 tab(s) by mouth every 6 hours, As Needed  -- Indication: For pain    aspirin 81 mg oral delayed release tablet  -- 1 tab(s) by mouth once a day  -- Indication: For Stroke prevention    clonazePAM 1 mg oral tablet  -- 2 tab(s) by mouth 2 times a day  -- Indication: For Anxiety    LaMICtal 100 mg oral tablet  -- 1 tab(s) by mouth 2 times a day  -- Indication: For Manic depressive disorder    Neurontin 300 mg oral capsule  -- 1 cap(s) by mouth 2 times a day  -- Indication: For Anxiety    atorvastatin 80 mg oral tablet  -- 1 tab(s) by mouth once a day (at bedtime)  -- Indication: For HLD    clopidogrel 75 mg oral tablet  -- 1 tab(s) by mouth once a day  -- Do not take aspirin or aspirin containing products without knowledge and consent of your physician.    -- Indication: For Stroke prevention    melatonin 10 mg oral capsule  -- 3 cap(s) by mouth once a day (at bedtime)  -- Indication: For Insomnia    Wellbutrin  mg/24 hours oral tablet, extended release  -- 1 tab(s) by mouth every 24 hours  -- Indication: For Manic depressive disorder I will START or STAY ON the medications listed below when I get home from the hospital:    one a day with probiotics  -- 1 tab(s) by mouth once a day  -- Indication: For probiotics    menthol patch  -- 1 patch by transdermal patch , As Needed  -- Indication: For Smoking cessation     Aleve 220 mg oral tablet  -- 3 tab(s) by mouth every 6 hours, As Needed  -- Indication: For pain    aspirin 81 mg oral delayed release tablet  -- 1 tab(s) by mouth once a day  -- Indication: For Stroke prevention    clonazePAM 1 mg oral tablet  -- 2 tab(s) by mouth 2 times a day  -- Indication: For Anxiety    LaMICtal 100 mg oral tablet  -- 1 tab(s) by mouth 2 times a day  -- Indication: For Manic depressive disorder    Neurontin 300 mg oral capsule  -- 1 cap(s) by mouth 2 times a day  -- Indication: For Anxiety    atorvastatin 80 mg oral tablet  -- 1 tab(s) by mouth once a day (at bedtime)  -- Indication: For HLD    atorvastatin 80 mg oral tablet  -- 1 tab(s) by mouth once a day (at bedtime)  -- Indication: For HLD    clopidogrel 75 mg oral tablet  -- 1 tab(s) by mouth once a day  -- Do not take aspirin or aspirin containing products without knowledge and consent of your physician.    -- Indication: For Stroke prevention    melatonin 10 mg oral capsule  -- 3 cap(s) by mouth once a day (at bedtime)  -- Indication: For Insomnia    Wellbutrin  mg/24 hours oral tablet, extended release  -- 1 tab(s) by mouth every 24 hours  -- Indication: For Manic depressive disorder

## 2017-06-18 LAB
B2 GLYCOPROT1 AB SER QL: NEGATIVE — SIGNIFICANT CHANGE UP
CARDIOLIPIN AB SER-ACNC: NEGATIVE — SIGNIFICANT CHANGE UP

## 2017-06-19 LAB
AT III ACT/NOR PPP CHRO: 68 % — LOW (ref 85–135)
PROT C ACT/NOR PPP: 77 % — SIGNIFICANT CHANGE UP (ref 74–150)
PROT S FREE AG PPP IA-ACNC: 70 % — SIGNIFICANT CHANGE UP (ref 61–131)

## 2017-06-20 LAB — APCR PPP: 3.1 RATIO — SIGNIFICANT CHANGE UP

## 2017-06-21 ENCOUNTER — OFFICE VISIT (OUTPATIENT)
Dept: PODIATRY | Facility: CLINIC | Age: 47
End: 2017-06-21
Payer: COMMERCIAL

## 2017-06-21 VITALS — HEART RATE: 93 BPM | DIASTOLIC BLOOD PRESSURE: 79 MMHG | OXYGEN SATURATION: 100 % | SYSTOLIC BLOOD PRESSURE: 125 MMHG

## 2017-06-21 DIAGNOSIS — M19.071 PRIMARY LOCALIZED OSTEOARTHROSIS, ANKLE AND FOOT, RIGHT: ICD-10-CM

## 2017-06-21 DIAGNOSIS — M79.671 RIGHT FOOT PAIN: Primary | ICD-10-CM

## 2017-06-21 PROCEDURE — 20550 NJX 1 TENDON SHEATH/LIGAMENT: CPT | Mod: RT | Performed by: PODIATRIST

## 2017-06-21 ASSESSMENT — PAIN SCALES - GENERAL: PAINLEVEL: SEVERE PAIN (6)

## 2017-06-21 NOTE — PATIENT INSTRUCTIONS
Thanks for coming today.  Ortho/Sports Medicine Clinic  [address] [place] [address]    To schedule future appointments in Ortho Clinic, you may call [phone].    To schedule ordered imaging by your Provider: Call [facility] at [phone]    [employer] available online at:   [email or web address]    Please call if any further questions or concerns [phone] and ask for the Orthopedic Department. Clinic hours 8 am to 5 pm.    Return to clinic if symptoms worsen.

## 2017-06-21 NOTE — NURSING NOTE
"Pita Maher's goals for this visit include: Right foot pain rechck   She requests these members of her care team be copied on today's visit information: yes    PCP: Jaz Torres    Referring Provider:  No referring provider defined for this encounter.    Chief Complaint   Patient presents with     RECHECK     1 week recheck right foot pain       Initial /79  Pulse 93  LMP 05/25/2017  SpO2 100% Estimated body mass index is 35.52 kg/(m^2) as calculated from the following:    Height as of 6/14/17: 1.645 m (5' 4.75\").    Weight as of 6/14/17: 96.1 kg (211 lb 12.8 oz).  Medication Reconciliation: complete    "

## 2017-06-21 NOTE — MR AVS SNAPSHOT
After Visit Summary   6/21/2017    Pita Maher    MRN: [id number]           Patient Information     Date Of Birth          1970        Visit Information        Provider Department      6/21/2017 10:30 AM Maynor López DPM [facility]        Today's Diagnoses     Right foot pain    -  1    Primary localized osteoarthrosis, ankle and foot, right          Care Instructions    Thanks for coming today.  Ortho/Sports Medicine Clinic  [address] [place] [address]    To schedule future appointments in Ortho Clinic, you may call [phone].    To schedule ordered imaging by your Provider: Call [facility] at [phone]    [employer] available online at:   [email or web address]    Please call if any further questions or concerns [phone] and ask for the Orthopedic Department. Clinic hours 8 am to 5 pm.    Return to clinic if symptoms worsen.            Follow-ups after your visit        Who to contact     If you have questions or need follow up information about today's clinic visit or your schedule please contact [facility] directly at [phone].  Normal or non-critical lab and imaging results will be communicated to you by [employer], letter or phone within 4 business days after the clinic has received the results. If you do not hear from us within 7 days, please contact the clinic through [employer] or phone. If you have a critical or abnormal lab result, we will notify you by phone as soon as possible.  Submit refill requests through [employer] or call your pharmacy and they will forward the refill request to us. Please allow 3 business days for your refill to be completed.          Additional Information About Your Visit        [employer] Information     [employer] gives you secure access to your electronic health record. If you see a primary care provider, you can also send messages to your care team and make appointments. If you have  questions, please call your primary care clinic.  If you do not have a primary care provider, please call [phone] and they will assist you.      [employer] is an electronic gateway that provides easy, online access to your medical records. With [employer], you can request a clinic appointment, read your test results, renew a prescription or communicate with your care team.     To access your existing account, please contact your [facility] or call [phone] for assistance.        Care EveryWhere ID     This is your Care EveryWhere ID. This could be used by other organizations to access your [place] medical records  [id number]        Your Vitals Were     Pulse Last Period Pulse Oximetry             93 05/25/2017 100%          Blood Pressure from Last 3 Encounters:   06/21/17 125/79   06/14/17 119/79   05/25/17 114/78    Weight from Last 3 Encounters:   06/14/17 96.1 kg (211 lb 12.8 oz)   05/25/17 96.1 kg (211 lb 12.8 oz)   12/30/16 102.5 kg (225 lb 14.4 oz)              We Performed the Following     INJECTION SINGLE TENDON SHEATH/LIGAMENT        Primary Care Provider Office Phone # Fax #    Jaz Valery Torres, APRN [facility] [phone]       [facility] [address]  [place] [address]        Equal Access to Services     ALICIA JOSE : Hadii aad ku hadasho Soomaali, waaxda luqadaha, qaybta kaalmada adeegyada, waxjessika gary hayrikki colvin. So [facility] [phone].    ATENCIÓN: Si habla español, tiene a chapman disposición servicios gratuitos de asistencia lingüística. Dk al [phone].    We comply with applicable federal civil rights laws and [place] laws. We do not discriminate on the basis of race, color, national origin, age, disability sex, sexual orientation or gender identity.            Thank you!     Thank you for choosing [facility]  for your care. Our goal is always to provide you with excellent care.  Hearing back from our patients is one way we can continue to improve our services. Please take a few minutes to complete the written survey that you may receive in the mail after your visit with us. Thank you!             Your Updated Medication List - Protect others around you: Learn how to safely use, store and throw away your medicines at www.disposemymeds.org.          This list is accurate as of: 6/21/17 11:59 PM.  Always use your most recent med list.                   Brand Name Dispense Instructions for use Diagnosis    hydrochlorothiazide 25 MG tablet    HYDRODIURIL    90 tablet    TAKE 1 TABLET BY MOUTH ONCE DAILY    Essential hypertension with goal blood pressure less than 140/90       phentermine 15 MG capsule      Take 15 mg by mouth every morning        QUETIAPINE FUMARATE PO      Take 50 mg by mouth every 6 hours as needed        topiramate 25 MG tablet    TOPAMAX     Take 25 mg by mouth 5 tablets at bedtime        valACYclovir 500 MG tablet    VALTREX    90 tablet    Take 1 tablet (500 mg) by mouth daily    Recurrent herpes simplex

## 2017-06-21 NOTE — PROGRESS NOTES
Past Medical History:   Diagnosis Date     Abnormal Pap smear     20 years ago and had coloposcopy and froze     Chronic headache      DDD (degenerative disc disease), cervical 6/13/2014     DDD (degenerative disc disease), lumbar 11/25/2013     Depressive disorder, not elsewhere classified 1993    limited episodes in 1990s, no clinically significant depression since then     Headache(784.0) 1983     Herpes simplex without mention of complication      Hypertension Jan 2014     Lumbago 1988    ongoing problem since 18 years old     Migraine      Obesity, unspecified     always a larger child, went on to have Katie en Y 1999, with BMI > 40 at time of surgery     Ovarian cyst      Patient Active Problem List   Diagnosis     Insomnia     Bariatric surgery status     Chronic headache     Recurrent herpes simplex     CARDIOVASCULAR SCREENING; LDL GOAL LESS THAN 160     Obesity, Class I, BMI 30-34.9     Adjustment disorder with anxiety     Pyelonephritis     Advanced directives, counseling/discussion     HTN, goal below 140/90     Rotator cuff disorder     S/P rotator cuff repair     Rotator cuff (capsule) sprain     Rotator cuff tear     DDD (degenerative disc disease), lumbar     DDD (degenerative disc disease), cervical     Abnormal TSH     Past Surgical History:   Procedure Laterality Date     ARTHROSCOPIC RECONSTRUCTION ANTERIOR AND POSTERIOR CRUCIATE LIGAMENT, COMBINED       BACK SURGERY  2014    steroid injections     BACK SURGERY  2014    steroid injections     BIOPSY  1991 & 1992    left ear & left breast     BREAST SURGERY  1993    lump removal     BREAST SURGERY  1992    lump removal     COLONOSCOPY  1/22/2013    Procedure: COLONOSCOPY;  colonoscopy, loose stools, blood in stool;  Surgeon: Kamari Solomon MD;  Location: [facility]     CRYOTHERAPY  20 years ago     DILATION AND CURETTAGE SUCTION  2/24/2012    Procedure:DILATION AND CURETTAGE SUCTION; DILATION, CURETTAGE AND SUCTION ; Surgeon:JOHN HORNER  MALACHI; Location:[facility]     ENT SURGERY  1974    tonsils removed     LAPAROSCOPIC BYPASS GASTRIC       lump removed from left ear[       SURGICAL HISTORY OF -   1989    lt knee acl reconstruction     SURGICAL HISTORY OF -   2001    lt breast lump removal     SURGICAL HISTORY OF -   1999    gastric bypass (Katie en Y) by Dr. Fontenot at [facility]     SURGICAL HISTORY OF -   1974    tonsillectomy     SURGICAL HISTORY OF -   2005    LASIK     Social History     Social History     Marital status:      Spouse name: Paolo ( in 2007)     Number of children: o     Years of education: 16     Occupational History     insurance brokerage agent [employer]     Social History Main Topics     Smoking status: Never Smoker     Smokeless tobacco: Never Used      Comment: EX- SMOKED IN HOUSE FOR PAST 10 YEARS     Alcohol use No     Drug use: No     Sexual activity: Yes     Partners: Male     Birth control/ protection: Pill     Other Topics Concern     Blood Transfusions No     Sleep Concern Yes     Stress Concern Yes     Weight Concern Yes     Special Diet No     Back Care Yes     LOW BACK PAIN, UPPER BACK PAIN WITH NUMBNESS IN HANDS     Exercise Yes     2 X WEEKLY     Seat Belt Yes     Self-Exams Yes     Parent/Sibling W/ Cabg, Mi Or Angioplasty Before 65f 55m? Yes     Father     Social History Narrative     Family History   Problem Relation Age of Onset     Lipids Mother      Hypertension Mother      Anesthesia Reaction Mother      C.A.D. Father      first MI at 40 years old (was a smoker)     Lipids Father      HEART DISEASE Father      Hypertension Father      C.A.D. Maternal Grandmother      not premature presentation     CANCER Maternal Grandmother      skin     C.A.D. Paternal Grandmother      not premature presentation     DIABETES Paternal Grandmother      Hypertension Paternal Grandmother      Anxiety Disorder Brother      MENTAL ILLNESS Other      Breast Cancer No family hx of      Cancer - colorectal No  family hx of      SUBJECTIVE FINDINGS:  47-year-old female returns to clinic for right foot pain.  She relates the taping and strapping did not help.  She relates she feels the pain is even a little worse.  No other specific relieving or aggravating factors.      OBJECTIVE FINDINGS:  DP and PT are 2/4 right.  She has pain on palpation of the right tarsometatarsal joints.  No erythema, no edema, no odor, no calor.  She does have osteoarthritic changes, mostly in the second tarsometatarsal joint area on x-ray.      ASSESSMENT AND PLAN:  Right foot pain.  She has osteoarthritis of tarsometatarsal joint.  Diagnosis and treatment options discussed with the patient.  She requests corticosteroid injection.  She relates she has had these in the past in the shoulder and back and had no problems with them and they helped.        PROCEDURE:  Potential risks, expected benefits, expected outcomes and followup discussed with the patient and consent signed.  The right foot was prepped and the right tarsometatarsal joint area was injected at trigger point with 1 cc of dexamethasone sodium phosphate 4 mg/mL and 2 cc of 1% lidocaine mix.  The patient tolerated the procedure well with no complications.  She will return to clinic and see me as needed.  Diagnosis and treatment options discussed with her.

## 2017-06-26 ENCOUNTER — RESULT REVIEW (OUTPATIENT)
Age: 47
End: 2017-06-26

## 2017-07-23 ENCOUNTER — MYC REFILL (OUTPATIENT)
Dept: PEDIATRICS | Facility: CLINIC | Age: 47
End: 2017-07-23

## 2017-07-23 DIAGNOSIS — M51.369 DDD (DEGENERATIVE DISC DISEASE), LUMBAR: ICD-10-CM

## 2017-07-24 RX ORDER — GABAPENTIN 400 MG/1
1 CAPSULE ORAL
Qty: 0 | Refills: 0 | COMMUNITY

## 2017-07-24 RX ORDER — BUPROPION HYDROCHLORIDE 150 MG/1
1 TABLET, EXTENDED RELEASE ORAL
Qty: 0 | Refills: 0 | COMMUNITY

## 2017-07-24 RX ORDER — HYDROCODONE BITARTRATE AND ACETAMINOPHEN 5; 325 MG/1; MG/1
1 TABLET ORAL EVERY 6 HOURS PRN
Qty: 15 TABLET | Refills: 0 | Status: SHIPPED | OUTPATIENT
Start: 2017-07-24 | End: 2017-08-29

## 2017-07-24 RX ORDER — LANOLIN ALCOHOL/MO/W.PET/CERES
3 CREAM (GRAM) TOPICAL
Qty: 0 | Refills: 0 | COMMUNITY

## 2017-07-24 RX ORDER — CLONAZEPAM 1 MG
2 TABLET ORAL
Qty: 0 | Refills: 0 | COMMUNITY

## 2017-07-24 RX ORDER — LAMOTRIGINE 25 MG/1
1 TABLET, ORALLY DISINTEGRATING ORAL
Qty: 0 | Refills: 0 | COMMUNITY

## 2017-07-24 NOTE — TELEPHONE ENCOUNTER
My chart message sent to patient advising script for hydrocodone-acetamniophen is ready for  at the     Christy Mcdermott CMA

## 2017-07-24 NOTE — TELEPHONE ENCOUNTER
Message from [employer]:  Original authorizing provider: SHAHRAM Ambriz CNP would like a refill of the following medications:  HYDROcodone-acetaminophen (NORCO) 5-325 MG per tablet [SHAHRAM Ambriz CNP]    Preferred pharmacy: [facility] [address], [address]    Comment:

## 2017-07-24 NOTE — TELEPHONE ENCOUNTER
HYDROcodone-acetaminophen (NORCO) 5-325 MG per tablet      Last Written Prescription Date:  6/13/17  Last Fill Quantity: 15,   # refills: 0  Last Office Visit with [facility], [facility] or [facility] prescribing provider: 5/25/17  Future Office visit:       Routing refill request to provider for review/approval because:  Drug not on the [facility], [facility] or [facility] refill protocol or controlled substance

## 2017-07-31 ENCOUNTER — MYC REFILL (OUTPATIENT)
Dept: PEDIATRICS | Facility: CLINIC | Age: 47
End: 2017-07-31

## 2017-07-31 DIAGNOSIS — B00.9 RECURRENT HERPES SIMPLEX: ICD-10-CM

## 2017-07-31 RX ORDER — VALACYCLOVIR HYDROCHLORIDE 500 MG/1
500 TABLET, FILM COATED ORAL DAILY
Qty: 90 TABLET | Refills: 3 | Status: SHIPPED | OUTPATIENT
Start: 2017-07-31 | End: 2018-08-01

## 2017-07-31 NOTE — TELEPHONE ENCOUNTER
Message from MyChart:  Original authorizing provider: SHAHRAM Ambriz CNP would like a refill of the following medications:  valACYclovir (VALTREX) 500 MG tablet [SHAHRAM Ambriz CNP]    Preferred pharmacy: [facility] [address] [place] [address]    Comment:

## 2017-07-31 NOTE — TELEPHONE ENCOUNTER
Routing refill request to provider for review/approval because:  Labs not current:  Creatinine    Minnie Rao RN   [facility]

## 2017-07-31 NOTE — TELEPHONE ENCOUNTER
valACYclovir (VALTREX) 500 MG tablet     Last Written Prescription Date: 04/28/17  Last Fill Quantity: 90, # refills: 0  Last Office Visit with [facility] or [facility] prescribing provider: 05/25/17.        Creatinine   Date Value Ref Range Status   04/18/2016 0.83 0.52 - 1.04 mg/dL Final

## 2017-08-29 ENCOUNTER — MYC MEDICAL ADVICE (OUTPATIENT)
Dept: PEDIATRICS | Facility: CLINIC | Age: 47
End: 2017-08-29

## 2017-08-29 NOTE — TELEPHONE ENCOUNTER
My chart message sent to patient advising her per Joselin Torres she would like to see patient in December for a physical exam-per 05/25/17 notes.    Christy Mcdermott CMA

## 2017-09-26 ENCOUNTER — DOCUMENTATION ONLY (OUTPATIENT)
Dept: OTHER | Facility: CLINIC | Age: 47
End: 2017-09-26

## 2017-09-26 DIAGNOSIS — Z71.89 ACP (ADVANCE CARE PLANNING): Chronic | ICD-10-CM

## 2017-09-28 ENCOUNTER — MYC REFILL (OUTPATIENT)
Dept: PEDIATRICS | Facility: CLINIC | Age: 47
End: 2017-09-28

## 2017-09-28 DIAGNOSIS — M51.369 DDD (DEGENERATIVE DISC DISEASE), LUMBAR: ICD-10-CM

## 2017-09-28 RX ORDER — HYDROCODONE BITARTRATE AND ACETAMINOPHEN 5; 325 MG/1; MG/1
1 TABLET ORAL EVERY 6 HOURS PRN
Qty: 15 TABLET | Refills: 0 | Status: SHIPPED | OUTPATIENT
Start: 2017-09-28 | End: 2017-10-31

## 2017-09-28 NOTE — TELEPHONE ENCOUNTER
Message from MyChart:  Original authorizing provider: MD Pita Mejia would like a refill of the following medications:  HYDROcodone-acetaminophen (NORCO) 5-325 MG per tablet [Taqueria Siegel MD]    Preferred pharmacy: [facility] [address], [address]    Comment:

## 2017-09-28 NOTE — TELEPHONE ENCOUNTER
Routing refill request to provider for review/approval because:  Drug not on the [facility] refill protocol     HYDROcodone-acetaminophen (NORCO) 5-325 MG per tablet    Last Written Prescription Date: 8/29/2017  Last Fill Quantity: 15,  # refills: 0   Last Office Visit with [facility], [facility] or [facility] prescribing provider: 5/25/2017                                         Next 5 appointments (look out 90 days)     Oct 31, 2017  3:10 PM CDT   [facility] with SHAHRAM Ambriz CNP   [facility]    [address] [place] [id number] [phone]                  Shawnee Goldstein RN

## 2017-10-31 ENCOUNTER — OFFICE VISIT (OUTPATIENT)
Dept: PEDIATRICS | Facility: CLINIC | Age: 47
End: 2017-10-31
Payer: COMMERCIAL

## 2017-10-31 ENCOUNTER — RADIANT APPOINTMENT (OUTPATIENT)
Dept: GENERAL RADIOLOGY | Facility: CLINIC | Age: 47
End: 2017-10-31
Attending: NURSE PRACTITIONER
Payer: COMMERCIAL

## 2017-10-31 VITALS
TEMPERATURE: 96.8 F | OXYGEN SATURATION: 100 % | SYSTOLIC BLOOD PRESSURE: 130 MMHG | WEIGHT: 201.8 LBS | HEART RATE: 81 BPM | DIASTOLIC BLOOD PRESSURE: 70 MMHG | BODY MASS INDEX: 33.84 KG/M2

## 2017-10-31 DIAGNOSIS — Z13.1 SCREENING FOR DIABETES MELLITUS: ICD-10-CM

## 2017-10-31 DIAGNOSIS — Z12.31 ENCOUNTER FOR SCREENING MAMMOGRAM FOR BREAST CANCER: ICD-10-CM

## 2017-10-31 DIAGNOSIS — M51.369 DDD (DEGENERATIVE DISC DISEASE), LUMBAR: ICD-10-CM

## 2017-10-31 DIAGNOSIS — M25.552 HIP PAIN, LEFT: ICD-10-CM

## 2017-10-31 DIAGNOSIS — I10 HTN, GOAL BELOW 140/90: ICD-10-CM

## 2017-10-31 DIAGNOSIS — Z00.00 ENCOUNTER FOR ROUTINE ADULT HEALTH EXAMINATION WITHOUT ABNORMAL FINDINGS: Primary | ICD-10-CM

## 2017-10-31 DIAGNOSIS — Z13.6 CARDIOVASCULAR SCREENING; LDL GOAL LESS THAN 130: ICD-10-CM

## 2017-10-31 DIAGNOSIS — Z13.29 SCREENING FOR THYROID DISORDER: ICD-10-CM

## 2017-10-31 PROCEDURE — 73502 X-RAY EXAM HIP UNI 2-3 VIEWS: CPT | Performed by: RADIOLOGY

## 2017-10-31 PROCEDURE — 99396 PREV VISIT EST AGE 40-64: CPT | Performed by: NURSE PRACTITIONER

## 2017-10-31 RX ORDER — HYDROCODONE BITARTRATE AND ACETAMINOPHEN 5; 325 MG/1; MG/1
1 TABLET ORAL EVERY 6 HOURS PRN
Qty: 15 TABLET | Refills: 0 | Status: SHIPPED | OUTPATIENT
Start: 2017-10-31 | End: 2017-11-26

## 2017-10-31 ASSESSMENT — ANXIETY QUESTIONNAIRES
GAD7 TOTAL SCORE: 1
3. WORRYING TOO MUCH ABOUT DIFFERENT THINGS: NOT AT ALL
2. NOT BEING ABLE TO STOP OR CONTROL WORRYING: NOT AT ALL
7. FEELING AFRAID AS IF SOMETHING AWFUL MIGHT HAPPEN: NOT AT ALL
6. BECOMING EASILY ANNOYED OR IRRITABLE: SEVERAL DAYS
1. FEELING NERVOUS, ANXIOUS, OR ON EDGE: NOT AT ALL
5. BEING SO RESTLESS THAT IT IS HARD TO SIT STILL: NOT AT ALL

## 2017-10-31 ASSESSMENT — PATIENT HEALTH QUESTIONNAIRE - PHQ9
5. POOR APPETITE OR OVEREATING: NOT AT ALL
SUM OF ALL RESPONSES TO PHQ QUESTIONS 1-9: 3

## 2017-10-31 NOTE — MR AVS SNAPSHOT
After Visit Summary   10/31/2017    Pita Maher    MRN: [id number]           Patient Information     Date Of Birth          1970        Visit Information        Provider Department      10/31/2017 3:10 PM Jaz Torres APRN CNP [facility]        Today's Diagnoses     Encounter for routine adult health examination without abnormal findings    -  1    DDD (degenerative disc disease), lumbar        Hip pain, left        HTN, goal below 140/90        CARDIOVASCULAR SCREENING; LDL GOAL LESS THAN 130        Screening for diabetes mellitus        Screening for thyroid disorder        Encounter for screening mammogram for breast cancer          Care Instructions    PLAN:   1.   Symptomatic therapy suggested:  Increase calcium to 1000mg and 800iu Vit D  2.  Orders Placed This Encounter   Medications     HYDROcodone-acetaminophen (NORCO) 5-325 MG per tablet     Sig: Take 1 tablet by mouth every 6 hours as needed for pain     Dispense:  15 tablet     Refill:  0     Orders Placed This Encounter   Procedures     XR Hip Left 2-3 Views     MA Screening Digital Bilateral     Lipid panel reflex to direct LDL Fasting     Albumin Random Urine Quantitative with Creat Ratio     Comprehensive metabolic panel     JUST IN CASE     TSH with free T4 reflex       3. Patient needs to follow up in if no improvement,or sooner if worsening of symptoms or other symptoms develop.  FURTHER TESTING:       - xray hip   Mammogram   Will follow up and/or notify patient of  results via My Chart to determine further need for followup  FUTURE LABS:       - Schedule a fasting blood draw   Follow up office visit in one year for annual health maintenance exam, sooner PRN.    Preventive Health Recommendations  Female Ages 40 to 49    Yearly exam:     See your health care provider every year in order to  1. Review health changes.   2. Discuss preventive care.    3. Review your medicines if your doctor prescribed  any.      Get a Pap test every three years (unless you have an abnormal result and your provider advises testing more often).      If you get Pap tests with HPV test, you only need to test every 5 years, unless you have an abnormal result. You do not need a Pap test if your uterus was removed (hysterectomy) and you have not had cancer.      You should be tested each year for STDs (sexually transmitted diseases), if you're at risk.       Ask your doctor if you should have a mammogram.      Have a colonoscopy (test for colon cancer) if someone in your family has had colon cancer or polyps before age 50.       Have a cholesterol test every 5 years.       Have a diabetes test (fasting glucose) after age 45. If you are at risk for diabetes, you should have this test every 3 years.    Shots: Get a flu shot each year. Get a tetanus shot every 10 years.     Nutrition:     Eat at least 5 servings of fruits and vegetables each day.    Eat whole-grain bread, whole-wheat pasta and brown rice instead of white grains and rice.    Talk to your provider about Calcium and Vitamin D.     Lifestyle    Exercise at least 150 minutes a week (an average of 30 minutes a day, 5 days a week). This will help you control your weight and prevent disease.    Limit alcohol to one drink per day.    No smoking.     Wear sunscreen to prevent skin cancer.    See your dentist every six months for an exam and cleaning.  It was a pleasure seeing you today at the [facility]. Thank you for allowing us to care for you today. We truly hope we provided you with the excellent service you deserve. Please let us know if there is anything else we can do for you so we can be sure you are leaving completley satisfied with your care experience.       General information about your clinic   Clinic Hours Lab Hours (Appointments are required)   Mon-Thurs: 7:30 AM - 7 PM Mon-Thurs: 7:30 AM - 7 PM   Fri: 7:30 AM - 5 PM Fri: 7:30 AM - 5 PM         After Hours Nurse Advise & Appts:  [facility] Nurse Advisors: [phone]  [facility] On Call: to make appointments anytime: [phone] On Call Physician: call [phone] and answering service will page the on call physician.        For urgent appointments, please call [phone] and ask for the triage nurse or your care team clinic nurse.  How to contact my care team:  MyChart: [email or web address]   Phone: [phone]   Fax: [phone]       [facility]:   Phone: [phone]  Hours: 8:00 AM - 6:00 PM  Medication Refills:  Call your pharmacy and they will forward the refill to us. Please allow 3 business days for your refills to be completed.       Normal or non-critical lab and imaging results will be communicated to you by MyChart, letter or phone within 7 days.  If you do not hear from us within 10 days, please call the clinic. If you have a critical or abnormal lab result, we will notify you by phone as soon as possible.       We now have PWIC (Pediatric Walk in Care)  Monday-Friday from 7:30-4. Simply walk in and be seen for your urgent needs like cough, fever, rash, diarrhea or vomiting, pink eye, UTI. No appointments needed. Ask one of the team for more information      -Your Care Team:    Dr. Lizette Gaviria - Internal Medicine/Pediatrics   Dr. Taqueria Siegel - Family Medicine  Dr. Gayb Degroot - Pediatrics  Dr. Raquel Stephenson - Pediatrics  Jaz Torres CNP - Family Practice Nurse Practitioner            Follow-ups after your visit        Future tests that were ordered for you today     Open Future Orders        Priority Expected Expires Ordered    Lipid panel reflex to direct LDL Fasting Routine  12/31/2017 10/31/2017    Albumin Random Urine Quantitative with Creat Ratio Routine  12/31/2017 10/31/2017    Comprehensive metabolic panel Routine  12/31/2017 10/31/2017    JUST IN CASE Routine  12/31/2017 10/31/2017    TSH with free T4 reflex Routine  12/31/2017 10/31/2017    MA Screening  Digital Bilateral Routine  10/31/2018 10/31/2017    XR Hip Left 2-3 Views Routine 10/31/2017 10/31/2018 10/31/2017            Who to contact     If you have questions or need follow up information about today's clinic visit or your schedule please contact [facility] directly at [phone].  Normal or non-critical lab and imaging results will be communicated to you by [employer], letter or phone within 4 business days after the clinic has received the results. If you do not hear from us within 7 days, please contact the clinic through [employer] or phone. If you have a critical or abnormal lab result, we will notify you by phone as soon as possible.  Submit refill requests through [employer] or call your pharmacy and they will forward the refill request to us. Please allow 3 business days for your refill to be completed.          Additional Information About Your Visit        [employer] Information     [employer] gives you secure access to your electronic health record. If you see a primary care provider, you can also send messages to your care team and make appointments. If you have questions, please call your primary care clinic.  If you do not have a primary care provider, please call [phone] and they will assist you.      [employer] is an electronic gateway that provides easy, online access to your medical records. With [employer], you can request a clinic appointment, read your test results, renew a prescription or communicate with your care team.     To access your existing account, please contact your [facility] or call [phone] for assistance.        Care EveryWhere ID     This is your Care EveryWhere ID. This could be used by other organizations to access your [facility] medical records  [id number]        Your Vitals Were     Pulse Temperature Pulse Oximetry BMI (Body Mass Index)          81 96.8  F (36  C) (Temporal) 100% 33.84 kg/m2         Blood Pressure from Last 3  Encounters:   10/31/17 130/70   06/21/17 125/79   06/14/17 119/79    Weight from Last 3 Encounters:   10/31/17 201 lb 12.8 oz (91.5 kg)   06/14/17 211 lb 12.8 oz (96.1 kg)   05/25/17 211 lb 12.8 oz (96.1 kg)                 Where to get your medicines      Some of these will need a paper prescription and others can be bought over the counter.  Ask your nurse if you have questions.     Bring a paper prescription for each of these medications     HYDROcodone-acetaminophen 5-325 MG per tablet          Primary Care Provider Office Phone # Fax #    Jaz Torres, APRN [facility] [phone]       [address]  [place] [address]        Equal Access to Services     ALICIA JOSE : Olga chapman Sobonita, waaxda luqadaha, qaybta kaalmada adeegyajulian, radha goldsmith . So [facility] [phone].    ATENCIÓN: Si habla español, tiene a chapman disposición servicios gratuitos de asistencia lingüística. Llame al [phone].    We comply with applicable federal civil rights laws and [place] laws. We do not discriminate on the basis of race, color, national origin, age, disability, sex, sexual orientation, or gender identity.            Thank you!     Thank you for choosing [facility]  for your care. Our goal is always to provide you with excellent care. Hearing back from our patients is one way we can continue to improve our services. Please take a few minutes to complete the written survey that you may receive in the mail after your visit with us. Thank you!             Your Updated Medication List - Protect others around you: Learn how to safely use, store and throw away your medicines at www.disposemymeds.org.          This list is accurate as of: 10/31/17  3:51 PM.  Always use your most recent med list.                   Brand Name Dispense Instructions for use Diagnosis    hydrochlorothiazide 25 MG tablet    HYDRODIURIL    90 tablet    TAKE 1 TABLET BY  MOUTH ONCE DAILY    Essential hypertension with goal blood pressure less than 140/90       HYDROcodone-acetaminophen 5-325 MG per tablet    NORCO    15 tablet    Take 1 tablet by mouth every 6 hours as needed for pain    DDD (degenerative disc disease), lumbar       phentermine 15 MG capsule      Take 15 mg by mouth every morning        QUETIAPINE FUMARATE PO      Take 50 mg by mouth every 6 hours as needed        topiramate 25 MG tablet    TOPAMAX     Take 25 mg by mouth 5 tablets at bedtime        valACYclovir 500 MG tablet    VALTREX    90 tablet    Take 1 tablet (500 mg) by mouth daily    Recurrent herpes simplex
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
## 2017-10-31 NOTE — NURSING NOTE
"Chief Complaint   Patient presents with     Physical     AFE       Initial /70 (BP Location: Right arm, Patient Position: Sitting, Cuff Size: Adult Regular)  Pulse 81  Temp 96.8  F (36  C) (Temporal)  Wt 201 lb 12.8 oz (91.5 kg)  SpO2 100%  BMI 33.84 kg/m2 Estimated body mass index is 33.84 kg/(m^2) as calculated from the following:    Height as of 6/14/17: 5' 4.75\" (1.645 m).    Weight as of this encounter: 201 lb 12.8 oz (91.5 kg).  Medication Reconciliation: complete      VINCE Lepe      "

## 2017-10-31 NOTE — PATIENT INSTRUCTIONS
PLAN:   1.   Symptomatic therapy suggested:  Increase calcium to 1000mg and 800iu Vit D  2.  Orders Placed This Encounter   Medications     HYDROcodone-acetaminophen (NORCO) 5-325 MG per tablet     Sig: Take 1 tablet by mouth every 6 hours as needed for pain     Dispense:  15 tablet     Refill:  0     Orders Placed This Encounter   Procedures     XR Hip Left 2-3 Views     MA Screening Digital Bilateral     Lipid panel reflex to direct LDL Fasting     Albumin Random Urine Quantitative with Creat Ratio     Comprehensive metabolic panel     JUST IN CASE     TSH with free T4 reflex       3. Patient needs to follow up in if no improvement,or sooner if worsening of symptoms or other symptoms develop.  FURTHER TESTING:       - xray hip   Mammogram   Will follow up and/or notify patient of  results via My Chart to determine further need for followup  FUTURE LABS:       - Schedule a fasting blood draw   Follow up office visit in one year for annual health maintenance exam, sooner PRN.    Preventive Health Recommendations  Female Ages 40 to 49    Yearly exam:     See your health care provider every year in order to  1. Review health changes.   2. Discuss preventive care.    3. Review your medicines if your doctor prescribed any.      Get a Pap test every three years (unless you have an abnormal result and your provider advises testing more often).      If you get Pap tests with HPV test, you only need to test every 5 years, unless you have an abnormal result. You do not need a Pap test if your uterus was removed (hysterectomy) and you have not had cancer.      You should be tested each year for STDs (sexually transmitted diseases), if you're at risk.       Ask your doctor if you should have a mammogram.      Have a colonoscopy (test for colon cancer) if someone in your family has had colon cancer or polyps before age 50.       Have a cholesterol test every 5 years.       Have a diabetes test (fasting glucose) after age 45. If  you are at risk for diabetes, you should have this test every 3 years.    Shots: Get a flu shot each year. Get a tetanus shot every 10 years.     Nutrition:     Eat at least 5 servings of fruits and vegetables each day.    Eat whole-grain bread, whole-wheat pasta and brown rice instead of white grains and rice.    Talk to your provider about Calcium and Vitamin D.     Lifestyle    Exercise at least 150 minutes a week (an average of 30 minutes a day, 5 days a week). This will help you control your weight and prevent disease.    Limit alcohol to one drink per day.    No smoking.     Wear sunscreen to prevent skin cancer.    See your dentist every six months for an exam and cleaning.  It was a pleasure seeing you today at the [facility]. Thank you for allowing us to care for you today. We truly hope we provided you with the excellent service you deserve. Please let us know if there is anything else we can do for you so we can be sure you are leaving completley satisfied with your care experience.       General information about your clinic   Clinic Hours Lab Hours (Appointments are required)   Mon-Thurs: 7:30 AM - 7 PM Mon-Thurs: 7:30 AM - 7 PM   Fri: 7:30 AM - 5 PM Fri: 7:30 AM - 5 PM        After Hours Nurse Advise & Appts:  [facility] Nurse Advisors: [phone]  Matt On Call: to make appointments anytime: [phone] On Call Physician: call [phone] and answering service will page the on call physician.        For urgent appointments, please call [phone] and ask for the triage nurse or your care team clinic nurse.  How to contact my care team:  MyChart: [email or web address]   Phone: [phone]   Fax: [phone]       [facility]:   Phone: [phone]  Hours: 8:00 AM - 6:00 PM  Medication Refills:  Call your pharmacy and they will forward the refill to us. Please allow 3 business days for your refills to be completed.       Normal or non-critical lab and  imaging results will be communicated to you by MyChart, letter or phone within 7 days.  If you do not hear from us within 10 days, please call the clinic. If you have a critical or abnormal lab result, we will notify you by phone as soon as possible.       We now have PWIC (Pediatric Walk in Care)  Monday-Friday from 7:30-4. Simply walk in and be seen for your urgent needs like cough, fever, rash, diarrhea or vomiting, pink eye, UTI. No appointments needed. Ask one of the team for more information      -Your Care Team:    Dr. Lizette Gaviria - Internal Medicine/Pediatrics   Dr. Taqueria Siegel - Family Medicine  Dr. Gaby Degroot - Pediatrics  Dr. Raquel Stephenson - Pediatrics  Jaz Torres CNP - Family Practice Nurse Practitioner

## 2017-11-01 ASSESSMENT — ANXIETY QUESTIONNAIRES: GAD7 TOTAL SCORE: 1

## 2017-11-01 NOTE — PROGRESS NOTES
Gilbert Maher,    Attached are your test results.  Hip xray no significant bony changes. Some fullness in the joint area  Will refer you to orthopedics    Please call [phone] to make appointment  if you do not hear from referrals in the next few days.      Please contact us if you have any questions.    Jaz Torres, CNP

## 2017-11-16 ENCOUNTER — OFFICE VISIT (OUTPATIENT)
Dept: ORTHOPEDICS | Facility: CLINIC | Age: 47
End: 2017-11-16
Attending: NURSE PRACTITIONER
Payer: COMMERCIAL

## 2017-11-16 VITALS
BODY MASS INDEX: 33.04 KG/M2 | WEIGHT: 205.6 LBS | HEIGHT: 66 IN | DIASTOLIC BLOOD PRESSURE: 78 MMHG | HEART RATE: 84 BPM | SYSTOLIC BLOOD PRESSURE: 115 MMHG | OXYGEN SATURATION: 100 %

## 2017-11-16 DIAGNOSIS — S76.212A STRAIN OF ADDUCTOR MAGNUS MUSCLE OF LEFT LOWER EXTREMITY, INITIAL ENCOUNTER: Primary | ICD-10-CM

## 2017-11-16 DIAGNOSIS — Z13.29 SCREENING FOR THYROID DISORDER: ICD-10-CM

## 2017-11-16 DIAGNOSIS — Z00.00 ENCOUNTER FOR ROUTINE ADULT HEALTH EXAMINATION WITHOUT ABNORMAL FINDINGS: ICD-10-CM

## 2017-11-16 DIAGNOSIS — Z13.1 SCREENING FOR DIABETES MELLITUS: ICD-10-CM

## 2017-11-16 DIAGNOSIS — I10 HTN, GOAL BELOW 140/90: ICD-10-CM

## 2017-11-16 DIAGNOSIS — Z13.6 CARDIOVASCULAR SCREENING; LDL GOAL LESS THAN 130: ICD-10-CM

## 2017-11-16 DIAGNOSIS — E87.6 HYPOKALEMIA: Primary | ICD-10-CM

## 2017-11-16 LAB
ALBUMIN SERPL-MCNC: 3.6 G/DL (ref 3.4–5)
ALP SERPL-CCNC: 92 U/L (ref 40–150)
ALT SERPL W P-5'-P-CCNC: 17 U/L (ref 0–50)
ANION GAP SERPL CALCULATED.3IONS-SCNC: 8 MMOL/L (ref 3–14)
AST SERPL W P-5'-P-CCNC: 17 U/L (ref 0–45)
BILIRUB SERPL-MCNC: 0.4 MG/DL (ref 0.2–1.3)
BUN SERPL-MCNC: 12 MG/DL (ref 7–30)
CALCIUM SERPL-MCNC: 8.5 MG/DL (ref 8.5–10.1)
CHLORIDE SERPL-SCNC: 106 MMOL/L (ref 94–109)
CHOLEST SERPL-MCNC: 229 MG/DL
CO2 SERPL-SCNC: 26 MMOL/L (ref 20–32)
CREAT SERPL-MCNC: 0.92 MG/DL (ref 0.52–1.04)
CREAT UR-MCNC: 45 MG/DL
GFR SERPL CREATININE-BSD FRML MDRD: 65 ML/MIN/1.7M2
GLUCOSE SERPL-MCNC: 92 MG/DL (ref 70–99)
HDLC SERPL-MCNC: 105 MG/DL
LDLC SERPL CALC-MCNC: 104 MG/DL
MICROALBUMIN UR-MCNC: <5 MG/L
MICROALBUMIN/CREAT UR: NORMAL MG/G CR (ref 0–25)
NONHDLC SERPL-MCNC: 124 MG/DL
POTASSIUM SERPL-SCNC: 3.3 MMOL/L (ref 3.4–5.3)
PROT SERPL-MCNC: 7 G/DL (ref 6.8–8.8)
SODIUM SERPL-SCNC: 140 MMOL/L (ref 133–144)
TRIGL SERPL-MCNC: 99 MG/DL
TSH SERPL DL<=0.005 MIU/L-ACNC: 1.64 MU/L (ref 0.4–4)

## 2017-11-16 PROCEDURE — 84443 ASSAY THYROID STIM HORMONE: CPT | Performed by: NURSE PRACTITIONER

## 2017-11-16 PROCEDURE — 36415 COLL VENOUS BLD VENIPUNCTURE: CPT | Performed by: NURSE PRACTITIONER

## 2017-11-16 PROCEDURE — 82043 UR ALBUMIN QUANTITATIVE: CPT | Performed by: NURSE PRACTITIONER

## 2017-11-16 PROCEDURE — 99213 OFFICE O/P EST LOW 20 MIN: CPT | Performed by: FAMILY MEDICINE

## 2017-11-16 PROCEDURE — 80053 COMPREHEN METABOLIC PANEL: CPT | Performed by: NURSE PRACTITIONER

## 2017-11-16 PROCEDURE — 80061 LIPID PANEL: CPT | Performed by: NURSE PRACTITIONER

## 2017-11-16 RX ORDER — POTASSIUM CHLORIDE 750 MG/1
10 TABLET, EXTENDED RELEASE ORAL DAILY
Qty: 90 TABLET | Refills: 3 | Status: SHIPPED | OUTPATIENT
Start: 2017-11-16 | End: 2019-01-22

## 2017-11-16 ASSESSMENT — PAIN SCALES - GENERAL: PAINLEVEL: MILD PAIN (2)

## 2017-11-16 NOTE — PATIENT INSTRUCTIONS
Thanks for coming today.  Ortho[facility] Medicine Clinic  [address] [place] [address]    To schedule future appointments in Ortho Clinic, you may call [phone].    To schedule ordered imaging by your Provider: Call [facility] at [phone]    Local Labs available online at:   [email or web address]    Please call if any further questions or concerns [phone] and ask for the Orthopedic Department. Clinic hours 8 am to 5 pm.    Return to clinic if symptoms worsen.

## 2017-11-16 NOTE — NURSING NOTE
"Pita Maher's goals for this visit include:   Chief Complaint   Patient presents with     Consult     Groin/hip pain x4-6 months       She requests these members of her care team be copied on today's visit information: Yes    PCP: Jaz Torres    Referring Provider:  Jaz Torres, APRN CNP  [address]  [place] [address]    Chief Complaint   Patient presents with     Consult     Groin/hip pain x4-6 months       Initial /78 (BP Location: Right arm, Patient Position: Sitting, Cuff Size: Adult Large)  Pulse 84  Ht 1.664 m (5' 5.5\")  Wt 93.3 kg (205 lb 9.6 oz)  SpO2 100%  BMI 33.69 kg/m2 Estimated body mass index is 33.69 kg/(m^2) as calculated from the following:    Height as of this encounter: 1.664 m (5' 5.5\").    Weight as of this encounter: 93.3 kg (205 lb 9.6 oz).  Medication Reconciliation: complete    Do you need any medication refills at today's visit? No    "

## 2017-11-16 NOTE — PROGRESS NOTES
CHIEF COMPLAINT:  Consult (Groin/hip pain x4-6 months)       HISTORY OF PRESENT ILLNESS  Ms. Maher is a pleasant 47 year old year old female who presents to clinic today with left hip pain.  Pita is seen at the request of Joselin Torres.    Approximately 4-6 months ago Pita felt a pull in her groin while crossing her leg over to get into her car. Immediate pain, directly in the crease of her hip. Pain was pretty severe for the first week or 2, has gotten better to some degree, although has always been present to some extent.  Reports tenderness when she touches the area, denies any shira pain inside the hip joint itslf, although this is difficult for herto discern. She had recent x-rays of her hip. She hasn't tried any therapy or medications yet.    She does have a history of chronic low back pain.    Pita did have a gastric bypass a few years ago.    Onset: gradual  Location: left hip  Quality:  aching, dull, stabbing, sharp and throbing  Duration: 4-6 months   Modifying factors:  resting and non-use makes it better, movement and use makes it worse  Previous similar pain: No    Additional history: as documented    MEDICAL HISTORY  Patient Active Problem List   Diagnosis     Insomnia     Bariatric surgery status     Chronic headache     Recurrent herpes simplex     CARDIOVASCULAR SCREENING; LDL GOAL LESS THAN 130     Obesity, Class I, BMI 30-34.9     Adjustment disorder with anxiety     Pyelonephritis     ACP (advance care planning)     HTN, goal below 140/90     Rotator cuff disorder     S/P rotator cuff repair     Rotator cuff (capsule) sprain     Rotator cuff tear     DDD (degenerative disc disease), lumbar     DDD (degenerative disc disease), cervical     Abnormal TSH       Current Outpatient Prescriptions   Medication Sig Dispense Refill     HYDROcodone-acetaminophen (NORCO) 5-325 MG per tablet Take 1 tablet by mouth every 6 hours as needed for pain 15 tablet 0     valACYclovir (VALTREX) 500 MG tablet Take 1  "tablet (500 mg) by mouth daily 90 tablet 3     hydrochlorothiazide (HYDRODIURIL) 25 MG tablet TAKE 1 TABLET BY MOUTH ONCE DAILY 90 tablet 1     phentermine 15 MG capsule Take 15 mg by mouth every morning       topiramate (TOPAMAX) 25 MG tablet Take 25 mg by mouth 5 tablets at bedtime       QUETIAPINE FUMARATE PO Take 50 mg by mouth every 6 hours as needed         Allergies   Allergen Reactions     Lomotil        Family History   Problem Relation Age of Onset     Lipids Mother      Hypertension Mother      Anesthesia Reaction Mother      Hyperlipidemia Mother      C.A.D. Father      first MI at 40 years old (was a smoker)     Lipids Father      HEART DISEASE Father      Hypertension Father      C.A.D. Maternal Grandmother      not premature presentation     CANCER Maternal Grandmother      skin     C.A.D. Paternal Grandmother      not premature presentation     DIABETES Paternal Grandmother      Hypertension Paternal Grandmother      Anxiety Disorder Brother      MENTAL ILLNESS Other      Breast Cancer No family hx of      Cancer - colorectal No family hx of        Additional medical/Social/Surgical histories reviewed in [facility] and updated as appropriate.     REVIEW OF SYSTEMS (11/16/2017)  CONSTITUTIONAL: Denies fever and weight loss  EYES: Denies acute vision changes  ENT: Denies hearing changes or difficulty swallowing  CARDIAC: Denies chest pain or edema  RESPIRATORY: Denies dyspnea, cough or wheeze  GASTROINTESTINAL: Denies abdominal pain, nausea, vomiting  MUSCULOSKELETAL: See HPI  SKIN: Denies any recent rash or lesion  NEUROLOGICAL: Denies numbness or focal weakness  PSYCHIATRIC: No history of psychiatric symptoms or problems  HEMATOLOGY: Denies episodes of easy bleeding      PHYSICAL EXAM  Vitals:    11/16/17 0908   BP: 115/78   BP Location: Right arm   Patient Position: Sitting   Cuff Size: Adult Large   Pulse: 84   SpO2: 100%   Weight: 93.3 kg (205 lb 9.6 oz)   Height: 1.664 m (5' 5.5\")     General  - normal " appearance, in no obvious distress  CV  - normal femoral pulse  Pulm  - normal respiratory pattern, non-labored  Musculoskeletal - left hip  - stance: normal gait without limp  - inspection: no swelling or effusion,  normal bone and joint alignment, no obvious deformity  - palpation: tender at insertion of adductor muscle group, adductor tendon  - ROM: some pain with flexion and internal rotation, although feels this in the area of her pubic bone, normal extension, external rotation, abduction, and adduction  - strength: 5/5 in all planes  - special tests:  (-) PABLO although causes lateral hip pain  (-) FADIR although causes pubic pain  no pain with axial femoral load  Neuro  - no sensory or motor deficit, grossly normal coordination, normal muscle tone  Skin  - no ecchymosis, erythema, warmth, or induration, no obvious rash  Psych  - interactive, appropriate, normal mood and affect         ASSESSMENT & PLAN  Ms. Maher is a 47 year old year old female who presents to clinic today with left groin pain.    I reviewed Pita's hip x-ray in the room with her which shows a preserved joint space with no obvious impingement signs.    I do believe that Pita's pain is most likely secondary to an adductor strain or partial tear.  We discussed this in detail, including common management strategies.  We also discussed the role of advanced imaging.    Ultimately, I would like for Pita to try a diligent course of physical therapy, over-the-counter medicines, and conservative modalities.    Pita will follow up in 3-4 weeks after starting therapy, if worsening or no better I will likely order advanced imaging.    Thank you for allowing me to participate in the care of Pita.    Austin Torres DO, [facility]  Primary Care Sports Medicine

## 2017-11-17 NOTE — PROGRESS NOTES
Gilbert Maher,    Attached are your test results.  -Liver and gallbladder tests (ALT,AST, Alk phos,bilirubin) are normal.  -Kidney function (GFR) is normal.  -Sodium is normal.  -Potassium is decreased.  ADVISE: start potassium 10 meq daily (a prescription was sent in) and lab recheck in 1 month (BMP, DX: hypokalemia).  -Glucose (diabetic screening test) is normal.  -Cholesterol levels (LDL,HDL, Triglycerides) are normal.  ADVISE: rechecking in 1 year.  -TSH (thyroid stimulating hormone) level is normal which indicates normal thyroid function.  -Microalbumin (urine protein) test is normal.  ADVISE: recheck annually   Please contact us if you have any questions.    Jaz Torres, CNP

## 2017-11-26 ENCOUNTER — MYC REFILL (OUTPATIENT)
Dept: PEDIATRICS | Facility: CLINIC | Age: 47
End: 2017-11-26

## 2017-11-26 DIAGNOSIS — M51.369 DDD (DEGENERATIVE DISC DISEASE), LUMBAR: ICD-10-CM

## 2017-11-26 DIAGNOSIS — I10 ESSENTIAL HYPERTENSION WITH GOAL BLOOD PRESSURE LESS THAN 140/90: ICD-10-CM

## 2017-11-27 RX ORDER — HYDROCODONE BITARTRATE AND ACETAMINOPHEN 5; 325 MG/1; MG/1
1 TABLET ORAL EVERY 6 HOURS PRN
Qty: 15 TABLET | Refills: 0 | Status: SHIPPED | OUTPATIENT
Start: 2017-11-27 | End: 2017-12-26

## 2017-11-27 NOTE — TELEPHONE ENCOUNTER
Message from [employer]:  Original authorizing provider: SHAHRAM Ambriz CNP would like a refill of the following medications:  HYDROcodone-acetaminophen (NORCO) 5-325 MG per tablet [SHAHRAM Ambriz CNP]    Preferred pharmacy: [facility] onsite pharmacy    Comment:

## 2017-11-27 NOTE — TELEPHONE ENCOUNTER
Message from MyChart:  Original authorizing provider: SHAHRAM Ambriz CNP would like a refill of the following medications:  hydrochlorothiazide (HYDRODIURIL) 25 MG tablet [SHAHRAM Ambriz CNP]    Preferred pharmacy: [facility] [address] [place] [address]    Comment:

## 2017-11-27 NOTE — TELEPHONE ENCOUNTER
HYDROcodone-acetaminophen (NORCO) 5-325 MG per tablet      Last Office Visit: 10/31/17  Last Written Prescription Date: 10/31/17  Last Fill Quantity: 15,   # refills: 0  Future Office visit:       Routing refill request to provider for review/approval because:  Drug not on the [facility], [facility] or [facility] refill protocol or controlled substance

## 2017-11-29 RX ORDER — HYDROCHLOROTHIAZIDE 25 MG/1
25 TABLET ORAL DAILY
Qty: 90 TABLET | Refills: 3 | Status: SHIPPED | OUTPATIENT
Start: 2017-11-29 | End: 2018-12-14

## 2017-12-26 ENCOUNTER — MYC REFILL (OUTPATIENT)
Dept: PEDIATRICS | Facility: CLINIC | Age: 47
End: 2017-12-26

## 2017-12-26 DIAGNOSIS — M51.369 DDD (DEGENERATIVE DISC DISEASE), LUMBAR: ICD-10-CM

## 2017-12-26 RX ORDER — HYDROCODONE BITARTRATE AND ACETAMINOPHEN 5; 325 MG/1; MG/1
1 TABLET ORAL EVERY 6 HOURS PRN
Qty: 15 TABLET | Refills: 0 | Status: SHIPPED | OUTPATIENT
Start: 2017-12-26 | End: 2018-01-29

## 2017-12-26 NOTE — TELEPHONE ENCOUNTER
Message from iConTexthart:  Original authorizing provider: SHAHRAM Ambriz CNP would like a refill of the following medications:  HYDROcodone-acetaminophen (NORCO) 5-325 MG per tablet [SHAHRAM Ambriz CNP]    Preferred pharmacy: [facility] [address], [address]    Comment:

## 2017-12-26 NOTE — TELEPHONE ENCOUNTER
Please inform patient, refill/prescriptioni approved. Please drop off prescription at pharmacy.   Please ask provider three to sign off

## 2017-12-26 NOTE — TELEPHONE ENCOUNTER
HYDROcodone-acetaminophen (NORCO) 5-325 MG per tablet      Last Written Prescription Date:  11/27/17  Last Fill Quantity: 15,   # refills: 0  Last Office Visit: 10/31/17  Future Office visit:       Routing refill request to provider for review/approval because:  Drug not on the [facility], P or [facility] refill protocol or controlled substance    Jaz Torres NP out of office until 12/28/17. Routed to provider pool for review.  Jaison Moore, CMA

## 2018-01-29 ENCOUNTER — MYC REFILL (OUTPATIENT)
Dept: PEDIATRICS | Facility: CLINIC | Age: 48
End: 2018-01-29

## 2018-01-29 DIAGNOSIS — M51.369 DDD (DEGENERATIVE DISC DISEASE), LUMBAR: ICD-10-CM

## 2018-01-30 DIAGNOSIS — Z98.84 HISTORY OF WEIGHT LOSS SURGERY: Primary | ICD-10-CM

## 2018-01-30 DIAGNOSIS — E66.9 OBESITY: ICD-10-CM

## 2018-01-30 LAB
AMPHETAMINES UR QL: ABNORMAL NG/ML
BARBITURATES UR QL SCN: NOT DETECTED NG/ML
BENZODIAZ UR QL SCN: NOT DETECTED NG/ML
BUPRENORPHINE UR QL: NOT DETECTED NG/ML
CANNABINOIDS UR QL: NOT DETECTED NG/ML
COCAINE UR QL SCN: NOT DETECTED NG/ML
D-METHAMPHET UR QL: NOT DETECTED NG/ML
METHADONE UR QL SCN: NOT DETECTED NG/ML
OPIATES UR QL SCN: NOT DETECTED NG/ML
OXYCODONE UR QL SCN: NOT DETECTED NG/ML
PCP UR QL SCN: NOT DETECTED NG/ML
PROPOXYPH UR QL: NOT DETECTED NG/ML
TRICYCLICS UR QL SCN: NOT DETECTED NG/ML

## 2018-01-30 PROCEDURE — 99000 SPECIMEN HANDLING OFFICE-LAB: CPT | Performed by: PSYCHIATRY & NEUROLOGY

## 2018-01-30 PROCEDURE — 80307 DRUG TEST PRSMV CHEM ANLYZR: CPT | Mod: 90 | Performed by: PSYCHIATRY & NEUROLOGY

## 2018-01-30 RX ORDER — HYDROCODONE BITARTRATE AND ACETAMINOPHEN 5; 325 MG/1; MG/1
1 TABLET ORAL EVERY 6 HOURS PRN
Qty: 15 TABLET | Refills: 0 | Status: SHIPPED | OUTPATIENT
Start: 2018-01-30 | End: 2018-02-27

## 2018-01-30 NOTE — TELEPHONE ENCOUNTER
Message from [employer]med[employer]:  Original authorizing provider: SHAHRAM Ambriz CNP would like a refill of the following medications:  HYDROcodone-acetaminophen (NORCO) 5-325 MG per tablet [SHAHRAM Ambriz CNP]    Preferred pharmacy: [facility] [address], [address]    Comment:

## 2018-01-30 NOTE — TELEPHONE ENCOUNTER
HYDROcodone-acetaminophen (NORCO) 5-325 MG per tablet      Last Written Prescription Date:  12/26/17  Last Fill Quantity: 15,   # refills: 0  Last Office Visit: 10/31/17  Future Office visit:       Routing refill request to provider for review/approval because:  Drug not on the [facility], [facility] or [facility] refill protocol or controlled substance

## 2018-01-31 DIAGNOSIS — E66.9 OBESITY: ICD-10-CM

## 2018-01-31 DIAGNOSIS — Z98.84 HISTORY OF WEIGHT LOSS SURGERY: Primary | ICD-10-CM

## 2018-02-02 ENCOUNTER — TRANSFERRED RECORDS (OUTPATIENT)
Dept: HEALTH INFORMATION MANAGEMENT | Facility: CLINIC | Age: 48
End: 2018-02-02

## 2018-02-05 LAB — PAIN DRUG SCR UR W RPTD MEDS: NORMAL

## 2018-02-27 ENCOUNTER — MYC REFILL (OUTPATIENT)
Dept: PEDIATRICS | Facility: CLINIC | Age: 48
End: 2018-02-27

## 2018-02-27 DIAGNOSIS — M51.369 DDD (DEGENERATIVE DISC DISEASE), LUMBAR: ICD-10-CM

## 2018-02-27 RX ORDER — HYDROCODONE BITARTRATE AND ACETAMINOPHEN 5; 325 MG/1; MG/1
1 TABLET ORAL EVERY 6 HOURS PRN
Qty: 15 TABLET | Refills: 0 | Status: SHIPPED | OUTPATIENT
Start: 2018-02-28 | End: 2018-03-30

## 2018-02-27 NOTE — TELEPHONE ENCOUNTER
Message from Friendferhart:  Original authorizing provider: SHAHRAM Ambriz CNP would like a refill of the following medications:  HYDROcodone-acetaminophen (NORCO) 5-325 MG per tablet [SHAHRAM Ambriz CNP]    Preferred pharmacy: [facility] [address], [address]    Comment:

## 2018-02-27 NOTE — TELEPHONE ENCOUNTER
Please inform patient, refill/prescriptioni approved. Please drop off prescription at pharmacy.

## 2018-02-27 NOTE — TELEPHONE ENCOUNTER
HYDROcodone-acetaminophen (NORCO) 5-325 MG per tablet      Last Written Prescription Date:  01/30/18  Last Fill Quantity: 15,   # refills: 0  Last Office Visit: 10/31/17  Future Office visit:       Routing refill request to provider for review/approval because:  Drug not on the [facility], [facility] or [facility] refill protocol or controlled substance

## 2018-03-30 ENCOUNTER — MYC REFILL (OUTPATIENT)
Dept: PEDIATRICS | Facility: CLINIC | Age: 48
End: 2018-03-30

## 2018-03-30 DIAGNOSIS — M51.369 DDD (DEGENERATIVE DISC DISEASE), LUMBAR: ICD-10-CM

## 2018-04-02 RX ORDER — HYDROCODONE BITARTRATE AND ACETAMINOPHEN 5; 325 MG/1; MG/1
1 TABLET ORAL EVERY 6 HOURS PRN
Qty: 15 TABLET | Refills: 0 | Status: SHIPPED | OUTPATIENT
Start: 2018-04-02 | End: 2018-04-30

## 2018-04-02 NOTE — TELEPHONE ENCOUNTER
Message from Cardbackhart:  Original authorizing provider: SHAHRAM Ambriz CNP would like a refill of the following medications:  HYDROcodone-acetaminophen (NORCO) 5-325 MG per tablet [SHAHRAM Ambriz CNP]    Preferred pharmacy: [facility] [address], [address]    Comment:

## 2018-04-02 NOTE — TELEPHONE ENCOUNTER
HYDROcodone-acetaminophen (NORCO) 5-325 MG per tablet      Last Written Prescription Date:  02/28/18  Last Fill Quantity: 15,   # refills: 0  Last Office Visit: 10/31/17  Future Office visit:       Routing refill request to provider for review/approval because:  Drug not on the [facility], [facility] or [facility] refill protocol or controlled substance

## 2018-04-24 ENCOUNTER — TRANSFERRED RECORDS (OUTPATIENT)
Dept: HEALTH INFORMATION MANAGEMENT | Facility: CLINIC | Age: 48
End: 2018-04-24

## 2018-04-30 ENCOUNTER — OFFICE VISIT (OUTPATIENT)
Dept: PEDIATRICS | Facility: CLINIC | Age: 48
End: 2018-04-30
Payer: COMMERCIAL

## 2018-04-30 VITALS
DIASTOLIC BLOOD PRESSURE: 76 MMHG | WEIGHT: 200.5 LBS | SYSTOLIC BLOOD PRESSURE: 120 MMHG | OXYGEN SATURATION: 98 % | TEMPERATURE: 97.9 F | BODY MASS INDEX: 32.86 KG/M2 | HEART RATE: 89 BPM

## 2018-04-30 DIAGNOSIS — R25.2 CRAMP OF LIMB: ICD-10-CM

## 2018-04-30 DIAGNOSIS — E87.6 HYPOKALEMIA: ICD-10-CM

## 2018-04-30 DIAGNOSIS — I10 HTN, GOAL BELOW 140/90: Primary | ICD-10-CM

## 2018-04-30 DIAGNOSIS — M51.369 DDD (DEGENERATIVE DISC DISEASE), LUMBAR: ICD-10-CM

## 2018-04-30 DIAGNOSIS — G47.00 INSOMNIA, UNSPECIFIED TYPE: ICD-10-CM

## 2018-04-30 LAB
ANION GAP SERPL CALCULATED.3IONS-SCNC: 7 MMOL/L (ref 3–14)
BUN SERPL-MCNC: 11 MG/DL (ref 7–30)
CALCIUM SERPL-MCNC: 8.8 MG/DL (ref 8.5–10.1)
CHLORIDE SERPL-SCNC: 106 MMOL/L (ref 94–109)
CO2 SERPL-SCNC: 28 MMOL/L (ref 20–32)
CREAT SERPL-MCNC: 0.85 MG/DL (ref 0.52–1.04)
GFR SERPL CREATININE-BSD FRML MDRD: 71 ML/MIN/1.7M2
GLUCOSE SERPL-MCNC: 100 MG/DL (ref 70–99)
MAGNESIUM SERPL-MCNC: 2.1 MG/DL (ref 1.6–2.3)
POTASSIUM SERPL-SCNC: 3.4 MMOL/L (ref 3.4–5.3)
SODIUM SERPL-SCNC: 141 MMOL/L (ref 133–144)

## 2018-04-30 PROCEDURE — 36415 COLL VENOUS BLD VENIPUNCTURE: CPT | Performed by: NURSE PRACTITIONER

## 2018-04-30 PROCEDURE — 99213 OFFICE O/P EST LOW 20 MIN: CPT | Performed by: NURSE PRACTITIONER

## 2018-04-30 PROCEDURE — 80048 BASIC METABOLIC PNL TOTAL CA: CPT | Performed by: NURSE PRACTITIONER

## 2018-04-30 PROCEDURE — 83735 ASSAY OF MAGNESIUM: CPT | Performed by: NURSE PRACTITIONER

## 2018-04-30 RX ORDER — HYDROCODONE BITARTRATE AND ACETAMINOPHEN 5; 325 MG/1; MG/1
1 TABLET ORAL EVERY 6 HOURS PRN
Qty: 15 TABLET | Refills: 0 | Status: SHIPPED | OUTPATIENT
Start: 2018-04-30 | End: 2018-05-29

## 2018-04-30 NOTE — PROGRESS NOTES
Gilbert Maher,    Attached are your test results.  -Kidney function is normal (Cr, GFR), Sodium is normal, Potassium is normal, Calcium is normal, Glucose is normal (diabetes screening test).   Magnesium is normal    Please contact us if you have any questions.    Jaz Torres, CNP

## 2018-04-30 NOTE — MR AVS SNAPSHOT
After Visit Summary   4/30/2018    Pita Maher    MRN: [id number]           Patient Information     Date Of Birth          1970        Visit Information        Provider Department      4/30/2018 1:10 PM Jaz Torres APRN CNP [facility]        Today's Diagnoses     HTN, goal below 140/90    -  1    Hypokalemia        Cramp of limb        DDD (degenerative disc disease), lumbar        Insomnia, unspecified type          Care Instructions    PLAN:   1.   Symptomatic therapy suggested: Continue current medications.  2.  Orders Placed This Encounter   Medications     HYDROcodone-acetaminophen (NORCO) 5-325 MG per tablet     Sig: Take 1 tablet by mouth every 6 hours as needed for pain     Dispense:  15 tablet     Refill:  0     Orders Placed This Encounter   Procedures     Magnesium       3. Patient needs to follow up in if no improvement,or sooner if worsening of symptoms or other symptoms develop.  Schedule physical exam in November     It was a pleasure seeing you today at the [facility]. Thank you for allowing us to care for you today. We truly hope we provided you with the excellent service you deserve. Please let us know if there is anything else we can do for you so we can be sure you are leaving completley satisfied with your care experience.       General information about your clinic   Clinic Hours Lab Hours (Appointments are required)   Mon-Thurs: 7:30 AM - 7 PM Mon-Thurs: 7:30 AM - 7 PM   Fri: 7:30 AM - 5 PM Fri: 7:30 AM - 5 PM        After Hours Nurse Advise & Appts:  Matt Nurse Advisors: [phone]  Matt On Call: to make appointments anytime: [phone] On Call Physician: call [phone] and answering service will page the on call physician.        For urgent appointments, please call [phone] and ask for the triage nurse or your care team clinic nurse.  How to contact my care team:  Lauren:  [email or web address]   Phone: [phone]   Fax: [phone]       [facility]:   Phone: [phone]  Hours: 8:00 AM - 6:00 PM  Medication Refills:  Call your pharmacy and they will forward the refill to us. Please allow 3 business days for your refills to be completed.       Normal or non-critical lab and imaging results will be communicated to you by MyChart, letter or phone within 7 days.  If you do not hear from us within 10 days, please call the clinic. If you have a critical or abnormal lab result, we will notify you by phone as soon as possible.       We now have PWIC (Pediatric Walk in Care)  Monday-Friday from 7:30-4. Simply walk in and be seen for your urgent needs like cough, fever, rash, diarrhea or vomiting, pink eye, UTI. No appointments needed. Ask one of the team for more information      -Your Care Team:    Dr. Lizette Gaviria - Internal Medicine/Pediatrics   Dr. Taqueria Siegel - Family Medicine  Dr. Gaby Degroot - Pediatrics  Dr. Raquel Stephenson - Pediatrics  Jaz Torres CNP - Family Practice Nurse Practitioner                           Follow-ups after your visit        Who to contact     If you have questions or need follow up information about today's clinic visit or your schedule please contact [facility] directly at [phone].  Normal or non-critical lab and imaging results will be communicated to you by MyChart, letter or phone within 4 business days after the clinic has received the results. If you do not hear from us within 7 days, please contact the clinic through [employer] or phone. If you have a critical or abnormal lab result, we will notify you by phone as soon as possible.  Submit refill requests through [employer] or call your pharmacy and they will forward the refill request to us. Please allow 3 business days for your refill to be completed.          Additional Information About Your Visit        Geniuzz Information     [employer]zz gives you secure access to  your electronic health record. If you see a primary care provider, you can also send messages to your care team and make appointments. If you have questions, please call your primary care clinic.  If you do not have a primary care provider, please call [phone] and they will assist you.      Harvest is an electronic gateway that provides easy, online access to your medical records. With Harvest, you can request a clinic appointment, read your test results, renew a prescription or communicate with your care team.     To access your existing account, please contact your [facility] or call [phone] for assistance.        Care EveryWhere ID     This is your Care EveryWhere ID. This could be used by other organizations to access your [facility] medical records  [id number]        Your Vitals Were     Pulse Temperature Last Period Pulse Oximetry BMI (Body Mass Index)       89 97.9  F (36.6  C) (Temporal) 04/24/2018 98% 32.86 kg/m2        Blood Pressure from Last 3 Encounters:   04/30/18 120/76   11/16/17 115/78   10/31/17 130/70    Weight from Last 3 Encounters:   04/30/18 200 lb 8 oz (90.9 kg)   11/16/17 205 lb 9.6 oz (93.3 kg)   10/31/17 201 lb 12.8 oz (91.5 kg)              We Performed the Following     **Basic metabolic panel FUTURE 2mo     Magnesium          Where to get your medicines      Some of these will need a paper prescription and others can be bought over the counter.  Ask your nurse if you have questions.     Bring a paper prescription for each of these medications     HYDROcodone-acetaminophen 5-325 MG per tablet         Information about OPIOIDS     PRESCRIPTION OPIOIDS: WHAT YOU NEED TO KNOW   You have a prescription for an opioid (narcotic) pain medicine. Opioids can cause addiction. If you have a history of chemical dependency of any type, you are at a higher risk of becoming addicted to opioids. Only take this medicine after all other options have been tried.  Take it for as short a time and as few doses as possible.     Do not:    Drive. If you drive while taking these medicines, you could be arrested for driving under the influence (DUI).    Operate heavy machinery    Do any other dangerous activities while taking these medicines.     Drink any alcohol while taking these medicines.      Take with any other medicines that contain acetaminophen. Read all labels carefully. Look for the word  acetaminophen  or  Tylenol.  Ask your pharmacist if you have questions or are unsure.    Store your pills in a secure place, locked if possible. We will not replace any lost or stolen medicine. If you don t finish your medicine, please throw away (dispose) as directed by your pharmacist. The [place] Pollution Control Agency has more information about safe disposal: https://www.pca.[facility].[place].us/living-green/managing-unwanted-medications    All opioids tend to cause constipation. Drink plenty of water and eat foods that have a lot of fiber, such as fruits, vegetables, prune juice, apple juice and high-fiber cereal. Take a laxative (Miralax, milk of magnesia, Colace, Senna) if you don t move your bowels at least every other day.          Primary Care Provider Office Phone # Fax #    SHAHRAM Ambriz [facility] [phone]       [address]  [place] [address]        Equal Access to Services     ALICIA JOSE AH: Hadii leonel schmidt hadasho Soomaali, waaxda luqadaha, qaybta kaalmada adeegyada, radha gary hayrikki colvin. So [facility] [phone].    ATENCIÓN: Si habla español, tiene a chapman disposición servicios gratuitos de asistencia lingüística. Llame al [phone].    We comply with applicable federal civil rights laws and [place] laws. We do not discriminate on the basis of race, color, national origin, age, disability, sex, sexual orientation, or gender identity.            Thank you!     Thank you for choosing [facility]  for your  care. Our goal is always to provide you with excellent care. Hearing back from our patients is one way we can continue to improve our services. Please take a few minutes to complete the written survey that you may receive in the mail after your visit with us. Thank you!             Your Updated Medication List - Protect others around you: Learn how to safely use, store and throw away your medicines at www.disposemymeds.org.          This list is accurate as of 4/30/18  1:32 PM.  Always use your most recent med list.                   Brand Name Dispense Instructions for use Diagnosis    hydrochlorothiazide 25 MG tablet    HYDRODIURIL    90 tablet    Take 1 tablet (25 mg) by mouth daily    Essential hypertension with goal blood pressure less than 140/90       HYDROcodone-acetaminophen 5-325 MG per tablet    NORCO    15 tablet    Take 1 tablet by mouth every 6 hours as needed for pain    DDD (degenerative disc disease), lumbar       phentermine 15 MG capsule      Take 15 mg by mouth every morning        potassium chloride SA 10 MEQ CR tablet    K-DUR/KLOR-CON M    90 tablet    Take 1 tablet (10 mEq) by mouth daily    Hypokalemia       QUETIAPINE FUMARATE PO      Take 50 mg by mouth At Bedtime        topiramate 25 MG tablet    TOPAMAX     Take 25 mg by mouth 5 tablets at bedtime        valACYclovir 500 MG tablet    VALTREX    90 tablet    Take 1 tablet (500 mg) by mouth daily    Recurrent herpes simplex

## 2018-04-30 NOTE — NURSING NOTE
"Chief Complaint   Patient presents with     Recheck Medication       Initial /76 (BP Location: Right arm, Patient Position: Chair, Cuff Size: Adult Large)  Pulse 89  Temp 97.9  F (36.6  C) (Temporal)  Wt 200 lb 8 oz (90.9 kg)  LMP 04/24/2018  SpO2 98%  BMI 32.86 kg/m2 Estimated body mass index is 32.86 kg/(m^2) as calculated from the following:    Height as of 11/16/17: 5' 5.5\" (1.664 m).    Weight as of this encounter: 200 lb 8 oz (90.9 kg).  Medication Reconciliation: complete     Tanna Mccord CMA      "

## 2018-04-30 NOTE — PROGRESS NOTES
SUBJECTIVE:   Pita Maher is a 48 year old female who presents to clinic today for the following health issues:    Medication Followup of HYDROcodone-acetaminophen (NORCO) 5-325 MG per tablet    Taking Medication as prescribed: yes    Side Effects:  None    Medication Helping Symptoms:  yes   Patient has some chronic issues with low back pain  Will give a very small amount of vicodin as needed a month as other need to use other modalities first     She would also like to discuss other medications that are proscribed by a different provider to see if that is something we can take over.     PROBLEMS TO ADD ON...  Stated has been seeing psychiatry for issues with sleep   Has been giving her phentermine 3 month at a time  Started with him a year ago   He is not retiring and is very going to one day a week so limiting his hours  She has lost her job last week due changes at work.   Requesting us to fill these for her as his hours will be decreased   Discussed will not fill phentermine continuously and needs to keep follow up appointment with psychiatry if this is in his plan of care.    Problem list and histories reviewed & adjusted, as indicated.  Additional history: as documented    Patient Active Problem List   Diagnosis     Insomnia     Bariatric surgery status     Chronic headache     Recurrent herpes simplex     CARDIOVASCULAR SCREENING; LDL GOAL LESS THAN 130     Obesity, Class I, BMI 30-34.9     Adjustment disorder with anxiety     Pyelonephritis     ACP (advance care planning)     HTN, goal below 140/90     Rotator cuff disorder     S/P rotator cuff repair     Rotator cuff (capsule) sprain     Rotator cuff tear     DDD (degenerative disc disease), lumbar     DDD (degenerative disc disease), cervical     Abnormal TSH     Past Surgical History:   Procedure Laterality Date     ARTHROSCOPIC RECONSTRUCTION ANTERIOR AND POSTERIOR CRUCIATE LIGAMENT, COMBINED       BACK SURGERY  2014    steroid injections     BACK  SURGERY  2014    steroid injections     BIOPSY  1991 & 1992    left ear & left breast     BREAST SURGERY  1993    lump removal     BREAST SURGERY  1992    lump removal     COLONOSCOPY  1/22/2013    Procedure: COLONOSCOPY;  colonoscopy, loose stools, blood in stool;  Surgeon: Kamari Solomon MD;  Location: [facility]     CRYOTHERAPY  20 years ago     DILATION AND CURETTAGE SUCTION  2/24/2012    Procedure:DILATION AND CURETTAGE SUCTION; DILATION, CURETTAGE AND SUCTION ; Surgeon:JOHN HORNER; Location:[facility]     ENT SURGERY  1974    tonsils removed     LAPAROSCOPIC BYPASS GASTRIC       lump removed from left ear[       SURGICAL HISTORY OF -   1989    lt knee acl reconstruction     SURGICAL HISTORY OF -   2001    lt breast lump removal     SURGICAL HISTORY OF -   1999    gastric bypass (Katie en Y) by Dr. Fontenot at [facility]     SURGICAL HISTORY OF -   1974    tonsillectomy     SURGICAL HISTORY OF -   2005    LASIK       Social History   Substance Use Topics     Smoking status: Never Smoker     Smokeless tobacco: Never Used      Comment: EX- SMOKED IN HOUSE FOR PAST 10 YEARS     Alcohol use No     Family History   Problem Relation Age of Onset     Lipids Mother      Hypertension Mother      Anesthesia Reaction Mother      Hyperlipidemia Mother      C.A.D. Father      first MI at 40 years old (was a smoker)     Lipids Father      HEART DISEASE Father      Hypertension Father      C.A.D. Maternal Grandmother      not premature presentation     CANCER Maternal Grandmother      skin     C.A.D. Paternal Grandmother      not premature presentation     DIABETES Paternal Grandmother      Hypertension Paternal Grandmother      Anxiety Disorder Brother      MENTAL ILLNESS Other      Breast Cancer No family hx of      Cancer - colorectal No family hx of          Current Outpatient Prescriptions   Medication Sig Dispense Refill     hydrochlorothiazide (HYDRODIURIL) 25 MG tablet Take 1 tablet (25 mg) by mouth  daily 90 tablet 3     HYDROcodone-acetaminophen (NORCO) 5-325 MG per tablet Take 1 tablet by mouth every 6 hours as needed for pain 15 tablet 0     phentermine 15 MG capsule Take 15 mg by mouth every morning       potassium chloride SA (K-DUR/KLOR-CON M) 10 MEQ CR tablet Take 1 tablet (10 mEq) by mouth daily 90 tablet 3     QUETIAPINE FUMARATE PO Take 50 mg by mouth At Bedtime        topiramate (TOPAMAX) 25 MG tablet Take 25 mg by mouth 5 tablets at bedtime       valACYclovir (VALTREX) 500 MG tablet Take 1 tablet (500 mg) by mouth daily 90 tablet 3     Allergies   Allergen Reactions     Lomotil      Labs reviewed in EPIC    Reviewed and updated as needed this visit by clinical staff       Reviewed and updated as needed this visit by Provider         ROS:  Constitutional, HEENT, cardiovascular, pulmonary, gi and gu systems are negative, except as otherwise noted.    OBJECTIVE:     /76 (BP Location: Right arm, Patient Position: Chair, Cuff Size: Adult Large)  Pulse 89  Temp 97.9  F (36.6  C) (Temporal)  Wt 200 lb 8 oz (90.9 kg)  LMP 04/24/2018  SpO2 98%  BMI 32.86 kg/m2  Body mass index is 32.86 kg/(m^2).   Wt Readings from Last 4 Encounters:   04/30/18 200 lb 8 oz (90.9 kg)   11/16/17 205 lb 9.6 oz (93.3 kg)   10/31/17 201 lb 12.8 oz (91.5 kg)   06/14/17 211 lb 12.8 oz (96.1 kg)       GENERAL: healthy, alert and no distress  RESP: lungs clear to auscultation - no rales, rhonchi or wheezes  CV: regular rates and rhythm, no murmur, click or rub and no peripheral edema  MS: no gross musculoskeletal defects noted, no edema  SKIN: no suspicious lesions or rashes  PSYCH: mentation appears normal, affect normal/bright    Diagnostic Test Results:  Results for orders placed or performed in visit on 04/30/18   **Basic metabolic panel FUTURE 2mo   Result Value Ref Range    Sodium 141 133 - 144 mmol/L    Potassium 3.4 3.4 - 5.3 mmol/L    Chloride 106 94 - 109 mmol/L    Carbon Dioxide 28 20 - 32 mmol/L    Anion Gap 7  3 - 14 mmol/L    Glucose 100 (H) 70 - 99 mg/dL    Urea Nitrogen 11 7 - 30 mg/dL    Creatinine 0.85 0.52 - 1.04 mg/dL    GFR Estimate 71 >60 mL/min/1.7m2    GFR Estimate If Black 86 >60 mL/min/1.7m2    Calcium 8.8 8.5 - 10.1 mg/dL   Magnesium   Result Value Ref Range    Magnesium 2.1 1.6 - 2.3 mg/dL       ASSESSMENT/PLAN:         Pita was seen today for recheck medication.    Diagnoses and all orders for this visit:    HTN, goal below 140/90  HTN Plan:  1)  Medication: continue current medication regimen unchanged  2)  Dietary sodium restriction  3)  Regular aerobic exercise  4)  Recheck in 6 months, sooner should new symptoms or   problems arise.  5) See todays orders.    Patient Education: Reviewed risks of hypertension and principles of   treatment.      Hypokalemia  -     **Basic metabolic panel FUTURE 2mo    Cramp of limb  -     Magnesium  Will follow up and/or notify patient of  results via My Chart to determine further need for followup      DDD (degenerative disc disease), lumbar  -     HYDROcodone-acetaminophen (NORCO) 5-325 MG per tablet; Take 1 tablet by mouth every 6 hours as needed for pain    Insomnia, unspecified type  FOLLOW UP WITH SPECIALIST :Psychiatry    PLAN:   Patient needs to follow up in if no improvement,or sooner if worsening of symptoms or other symptoms develop.  Schedule physical exam in November   See Patient Instructions    SHAHRAM Ambriz CNP [facility]

## 2018-04-30 NOTE — PATIENT INSTRUCTIONS
PLAN:   1.   Symptomatic therapy suggested: Continue current medications.  2.  Orders Placed This Encounter   Medications     HYDROcodone-acetaminophen (NORCO) 5-325 MG per tablet     Sig: Take 1 tablet by mouth every 6 hours as needed for pain     Dispense:  15 tablet     Refill:  0     Orders Placed This Encounter   Procedures     Magnesium       3. Patient needs to follow up in if no improvement,or sooner if worsening of symptoms or other symptoms develop.  Schedule physical exam in November     It was a pleasure seeing you today at the [facility]. Thank you for allowing us to care for you today. We truly hope we provided you with the excellent service you deserve. Please let us know if there is anything else we can do for you so we can be sure you are leaving completley satisfied with your care experience.       General information about your clinic   Clinic Hours Lab Hours (Appointments are required)   Mon-Thurs: 7:30 AM - 7 PM Mon-Thurs: 7:30 AM - 7 PM   Fri: 7:30 AM - 5 PM Fri: 7:30 AM - 5 PM        After Hours Nurse Advise & Appts:  [facility] Nurse Advisors: [phone]  [facility] On Call: to make appointments anytime: [phone] On Call Physician: call [phone] and answering service will page the on call physician.        For urgent appointments, please call [phone] and ask for the triage nurse or your care team clinic nurse.  How to contact my care team:  Lauren: [email or web address]   Phone: [phone]   Fax: [phone]       [facility]:   Phone: [phone]  Hours: 8:00 AM - 6:00 PM  Medication Refills:  Call your pharmacy and they will forward the refill to us. Please allow 3 business days for your refills to be completed.       Normal or non-critical lab and imaging results will be communicated to you by MyChart, letter or phone within 7 days.  If you do not hear from us within 10 days, please call the clinic. If you have a critical or abnormal  lab result, we will notify you by phone as soon as possible.       We now have PWIC (Pediatric Walk in Care)  Monday-Friday from 7:30-4. Simply walk in and be seen for your urgent needs like cough, fever, rash, diarrhea or vomiting, pink eye, UTI. No appointments needed. Ask one of the team for more information      -Your Care Team:    Dr. Lizette Gaviria - Internal Medicine/Pediatrics   Dr. Taqueria Siegel - Family Medicine  Dr. Gaby Degroot - Pediatrics  Dr. Raquel Stephenson - Pediatrics  Jaz Torres CNP - Family Practice Nurse Practitioner

## 2018-05-29 ENCOUNTER — MYC REFILL (OUTPATIENT)
Dept: PEDIATRICS | Facility: CLINIC | Age: 48
End: 2018-05-29

## 2018-05-29 DIAGNOSIS — M51.369 DDD (DEGENERATIVE DISC DISEASE), LUMBAR: ICD-10-CM

## 2018-05-29 RX ORDER — HYDROCODONE BITARTRATE AND ACETAMINOPHEN 5; 325 MG/1; MG/1
1 TABLET ORAL EVERY 6 HOURS PRN
Qty: 15 TABLET | Refills: 0 | Status: SHIPPED | OUTPATIENT
Start: 2018-05-29 | End: 2018-06-27

## 2018-05-29 NOTE — TELEPHONE ENCOUNTER
Message from zlienhart:  Original authorizing provider: SHAHRAM Ambriz CNP would like a refill of the following medications:  HYDROcodone-acetaminophen (NORCO) 5-325 MG per tablet [SHAHRAM Ambriz CNP]    Preferred pharmacy: Other - [facility]    Comment:

## 2018-05-29 NOTE — TELEPHONE ENCOUNTER
HYDROcodone-acetaminophen (NORCO) 5-325 MG per tablet      Last Written Prescription Date:  04/30/18  Last Fill Quantity: 15,   # refills: 0  Last Office Visit: 04/30/18  Future Office visit:       Routing refill request to provider for review/approval because:  Drug not on the [facility], [facility] or [facility] refill protocol or controlled substance

## 2018-05-29 NOTE — TELEPHONE ENCOUNTER
Spoke with patient stated her Rx was ready to be picked up at [facility] and to bring a photo id with her when picking her Rx up. Patient stated understanding.     Tanna Mccord CMA

## 2018-06-27 ENCOUNTER — MYC REFILL (OUTPATIENT)
Dept: PEDIATRICS | Facility: CLINIC | Age: 48
End: 2018-06-27

## 2018-06-27 DIAGNOSIS — M51.369 DDD (DEGENERATIVE DISC DISEASE), LUMBAR: ICD-10-CM

## 2018-06-27 RX ORDER — HYDROCODONE BITARTRATE AND ACETAMINOPHEN 5; 325 MG/1; MG/1
1 TABLET ORAL EVERY 6 HOURS PRN
Qty: 15 TABLET | Refills: 0 | Status: SHIPPED | OUTPATIENT
Start: 2018-06-27 | End: 2018-07-29

## 2018-06-28 NOTE — TELEPHONE ENCOUNTER
Message from Lauren:  Marshall Morrissey MA Wed Jun 27, 2018 1:19 PM        ----- Message -----   From: Pita Maher   Sent: 6/27/2018 1:18 PM   To: [facility] [place]  Subject: Medication Renewal Request     Original authorizing provider: SHAHRAM Ambriz CNP would like a refill of the following medications:  HYDROcodone-acetaminophen (NORCO) 5-325 MG per tablet [SHAHRAM Ambriz CNP]    Preferred pharmacy: [facility] [address], [address]    Comment:

## 2018-07-29 ENCOUNTER — MYC REFILL (OUTPATIENT)
Dept: PEDIATRICS | Facility: CLINIC | Age: 48
End: 2018-07-29

## 2018-07-29 DIAGNOSIS — M51.369 DDD (DEGENERATIVE DISC DISEASE), LUMBAR: ICD-10-CM

## 2018-07-30 RX ORDER — HYDROCODONE BITARTRATE AND ACETAMINOPHEN 5; 325 MG/1; MG/1
1 TABLET ORAL EVERY 6 HOURS PRN
Qty: 15 TABLET | Refills: 0 | Status: SHIPPED | OUTPATIENT
Start: 2018-07-30 | End: 2018-08-30

## 2018-07-30 NOTE — TELEPHONE ENCOUNTER
HYDROcodone-acetaminophen (NORCO) 5-325 MG per tablet      Last Written Prescription Date:  06/27/18  Last Fill Quantity: 15,   # refills: 0  Last Office Visit: 04/30/18  Future Office visit:       Routing refill request to provider for review/approval because:  Drug not on the [facility], [facility] or [facility] refill protocol or controlled substance

## 2018-07-30 NOTE — TELEPHONE ENCOUNTER
Message from Banksnobhart:  Original authorizing provider: SHAHRAM Ambriz CNP would like a refill of the following medications:  HYDROcodone-acetaminophen (NORCO) 5-325 MG per tablet [SHAHRAM Ambriz CNP]    Preferred pharmacy: [facility] [address], [address]    Comment:

## 2018-08-01 ENCOUNTER — MYC REFILL (OUTPATIENT)
Dept: PEDIATRICS | Facility: CLINIC | Age: 48
End: 2018-08-01

## 2018-08-01 DIAGNOSIS — B00.9 RECURRENT HERPES SIMPLEX: ICD-10-CM

## 2018-08-01 RX ORDER — VALACYCLOVIR HYDROCHLORIDE 500 MG/1
500 TABLET, FILM COATED ORAL DAILY
Qty: 90 TABLET | Refills: 0 | Status: SHIPPED | OUTPATIENT
Start: 2018-08-01 | End: 2019-01-22

## 2018-08-01 NOTE — TELEPHONE ENCOUNTER
valACYclovir (VALTREX) 500 MG tablet 90 tablet 3 7/31/2017  No   Sig - Route: Take 1 tablet (500 mg) by mouth daily - Oral     Last OV with Jaz Torres CNP: 4/30/2018 Schedule physical exam in November     No future apts.     Creatinine   Date Value Ref Range Status   04/30/2018 0.85 0.52 - 1.04 mg/dL Final     Antivirals for Herpes Protocol Passed8/1 7:33 AM   Patient is age 12 or older    Recent (12 mo) or future (30 days) visit within the authorizing provider's specialty    Normal serum creatinine on file in past 12 months     Refilled per [facility] protocol.    Shawnee Goldstein RN

## 2018-08-01 NOTE — TELEPHONE ENCOUNTER
Message from MyChart:  Original authorizing provider: SHAHRAM Ambriz CNP would like a refill of the following medications:  valACYclovir (VALTREX) 500 MG tablet [SHAHRAM Ambriz CNP]    Preferred pharmacy: [facility] [address] [place] [address]    Comment:

## 2018-08-30 ENCOUNTER — MYC REFILL (OUTPATIENT)
Dept: PEDIATRICS | Facility: CLINIC | Age: 48
End: 2018-08-30

## 2018-08-30 DIAGNOSIS — M51.369 DDD (DEGENERATIVE DISC DISEASE), LUMBAR: ICD-10-CM

## 2018-08-30 RX ORDER — HYDROCODONE BITARTRATE AND ACETAMINOPHEN 5; 325 MG/1; MG/1
1 TABLET ORAL EVERY 6 HOURS PRN
Qty: 15 TABLET | Refills: 0 | Status: SHIPPED | OUTPATIENT
Start: 2018-08-30 | End: 2018-09-28

## 2018-08-30 NOTE — TELEPHONE ENCOUNTER
Message from BreakingPoint Systemshart:  Original authorizing provider: SHAHRAM Ambriz CNP would like a refill of the following medications:  HYDROcodone-acetaminophen (NORCO) 5-325 MG per tablet [SHAHRAM Ambriz CNP]    Preferred pharmacy: [facility] [address], [address]    Comment:

## 2018-08-30 NOTE — TELEPHONE ENCOUNTER
Routing refill request to provider for review/approval because:  Drug not on the [facility] refill protocol     HYDROcodone-acetaminophen (NORCO) 5-325 MG per tablet 15 tablet 0 7/30/2018  No      Sig - Route: Take 1 tablet by mouth every 6 hours as needed for pain - Oral       Last office visit:  4/30/18        Crystal Henderson RN,   [facility]

## 2018-09-28 ENCOUNTER — MYC REFILL (OUTPATIENT)
Dept: PEDIATRICS | Facility: CLINIC | Age: 48
End: 2018-09-28

## 2018-09-28 DIAGNOSIS — M51.369 DDD (DEGENERATIVE DISC DISEASE), LUMBAR: ICD-10-CM

## 2018-09-28 RX ORDER — HYDROCODONE BITARTRATE AND ACETAMINOPHEN 5; 325 MG/1; MG/1
1 TABLET ORAL EVERY 6 HOURS PRN
Qty: 15 TABLET | Refills: 0 | Status: SHIPPED | OUTPATIENT
Start: 2018-09-28 | End: 2018-11-01

## 2018-09-28 NOTE — TELEPHONE ENCOUNTER
HYDROcodone-acetaminophen (NORCO) 5-325 MG per tablet      Last Written Prescription Date:  08/30/18  Last Fill Quantity: 15,   # refills: 0  Last Office Visit: 04/30/18  Future Office visit:       Routing refill request to provider for review/approval because:  Drug not on the [facility], [facility] or [facility] refill protocol or controlled substance

## 2018-09-28 NOTE — TELEPHONE ENCOUNTER
Message from Quturehart:  Original authorizing provider: Lizette Gaviria MD PhD    Pita Maher would like a refill of the following medications:  HYDROcodone-acetaminophen (NORCO) 5-325 MG per tablet [Lizette Gaviria MD PhD]    Preferred pharmacy: [facility] [address], [address]    Comment:

## 2018-11-01 ENCOUNTER — MYC REFILL (OUTPATIENT)
Dept: PEDIATRICS | Facility: CLINIC | Age: 48
End: 2018-11-01

## 2018-11-01 DIAGNOSIS — M51.369 DDD (DEGENERATIVE DISC DISEASE), LUMBAR: ICD-10-CM

## 2018-11-02 RX ORDER — HYDROCODONE BITARTRATE AND ACETAMINOPHEN 5; 325 MG/1; MG/1
1 TABLET ORAL EVERY 6 HOURS PRN
Qty: 15 TABLET | Refills: 0 | Status: SHIPPED | OUTPATIENT
Start: 2018-11-02 | End: 2018-11-05

## 2018-11-02 NOTE — TELEPHONE ENCOUNTER
Routing refill request to provider for review/approval because:  Drug not on the [facility] refill protocol      Disp Refills Start End ROCIO      HYDROcodone-acetaminophen (NORCO) 5-325 MG per tablet 15 tablet 0 9/28/2018  No     Sig - Route: Take 1 tablet by mouth every 6 hours as needed for pain - Oral       Last office visit:  4/30/18 Schedule physical exam in November     No future appts scheduled.        Crystal Henderson RN,   [facility]

## 2018-11-02 NOTE — TELEPHONE ENCOUNTER
Message from DriftToIthart:  Original authorizing provider: SHAHRAM Ambriz CNP would like a refill of the following medications:  HYDROcodone-acetaminophen (NORCO) 5-325 MG per tablet [SHAHRAM Ambriz CNP]    Preferred pharmacy: [facility] [address], [address]    Comment:

## 2018-11-05 DIAGNOSIS — M51.369 DDD (DEGENERATIVE DISC DISEASE), LUMBAR: ICD-10-CM

## 2018-11-05 RX ORDER — HYDROCODONE BITARTRATE AND ACETAMINOPHEN 5; 325 MG/1; MG/1
1 TABLET ORAL EVERY 6 HOURS PRN
Qty: 15 TABLET | Refills: 0 | Status: SHIPPED | OUTPATIENT
Start: 2018-11-05 | End: 2018-11-28

## 2018-11-28 ENCOUNTER — MYC REFILL (OUTPATIENT)
Dept: PEDIATRICS | Facility: CLINIC | Age: 48
End: 2018-11-28

## 2018-11-28 DIAGNOSIS — M51.369 DDD (DEGENERATIVE DISC DISEASE), LUMBAR: ICD-10-CM

## 2018-11-29 RX ORDER — HYDROCODONE BITARTRATE AND ACETAMINOPHEN 5; 325 MG/1; MG/1
1 TABLET ORAL EVERY 6 HOURS PRN
Qty: 15 TABLET | Refills: 0 | Status: SHIPPED | OUTPATIENT
Start: 2018-11-29 | End: 2018-12-31

## 2018-11-29 NOTE — TELEPHONE ENCOUNTER
Message from Diaphonicshart:  Original authorizing provider: SHAHRAM Ambriz CNP would like a refill of the following medications:  HYDROcodone-acetaminophen (NORCO) 5-325 MG per tablet [SHAHRAM Ambriz CNP]    Preferred pharmacy: Other - [facility]     Comment:

## 2018-11-29 NOTE — TELEPHONE ENCOUNTER
HYDROcodone-acetaminophen (NORCO) 5-325 MG per tablet      Last Written Prescription Date:  11/05/18  Last Fill Quantity: 15,   # refills: 0  Last Office Visit: 04/30/18 for medication recheck  Future Office visit:       Routing refill request to provider for review/approval because:  Drug not on the [facility], [facility] or [facility] refill protocol or controlled substance

## 2018-12-31 ENCOUNTER — MYC REFILL (OUTPATIENT)
Dept: PEDIATRICS | Facility: CLINIC | Age: 48
End: 2018-12-31

## 2018-12-31 DIAGNOSIS — M51.369 DDD (DEGENERATIVE DISC DISEASE), LUMBAR: ICD-10-CM

## 2018-12-31 RX ORDER — HYDROCODONE BITARTRATE AND ACETAMINOPHEN 5; 325 MG/1; MG/1
1 TABLET ORAL EVERY 6 HOURS PRN
Qty: 15 TABLET | Refills: 0 | Status: SHIPPED | OUTPATIENT
Start: 2018-12-31 | End: 2019-01-22

## 2018-12-31 NOTE — TELEPHONE ENCOUNTER
HYDROcodone-acetaminophen (NORCO) 5-325 MG tablet  Last Written Prescription Date:  11/29/18  Last Fill Quantity: 15,  # refills: 0   Last office visit: 4/30/2018 with prescribing provider:  4/30/18   Future Office Visit:   Next 5 appointments (look out 90 days)    Jan 22, 2019  1:10 PM CST  PHYSICAL with SHAHRAM Ambriz CNP  [facility] ([facility]) [address] [place] [id number] [phone]         Routing refill request to provider for review/approval because:  Drug not on the [facility] refill protocol     Minnie Rao RN   [facility]

## 2019-01-15 DIAGNOSIS — I10 ESSENTIAL HYPERTENSION WITH GOAL BLOOD PRESSURE LESS THAN 140/90: ICD-10-CM

## 2019-01-18 ENCOUNTER — DOCUMENTATION ONLY (OUTPATIENT)
Dept: LAB | Facility: CLINIC | Age: 49
End: 2019-01-18

## 2019-01-18 DIAGNOSIS — Z13.6 CARDIOVASCULAR SCREENING; LDL GOAL LESS THAN 130: ICD-10-CM

## 2019-01-18 DIAGNOSIS — Z13.29 SCREENING FOR THYROID DISORDER: ICD-10-CM

## 2019-01-18 DIAGNOSIS — Z13.1 SCREENING FOR DIABETES MELLITUS (DM): ICD-10-CM

## 2019-01-18 DIAGNOSIS — I10 HTN, GOAL BELOW 140/90: Primary | ICD-10-CM

## 2019-01-18 DIAGNOSIS — E87.6 HYPOKALEMIA: ICD-10-CM

## 2019-01-18 RX ORDER — HYDROCHLOROTHIAZIDE 25 MG/1
TABLET ORAL
Qty: 30 TABLET | Refills: 0 | Status: SHIPPED | OUTPATIENT
Start: 2019-01-18 | End: 2019-01-22

## 2019-01-18 NOTE — PROGRESS NOTES
Labs pended. Routing to provider for review and approval.    Minnie Rao RN   [facility]

## 2019-01-18 NOTE — TELEPHONE ENCOUNTER
hydrochlorothiazide (HYDRODIURIL) 25 MG tablet  Last Written Prescription Date:  12/17/18  Last Fill Quantity: 30,  # refills: 0   Last office visit: 4/30/2018 with prescribing provider:   Future Office Visit:   Next 5 appointments (look out 90 days)    Jan 22, 2019  1:10 PM CST  PHYSICAL with SHAHRAM Ambriz CNP  [facility] ([facility]) [address] [place] [id number]  [phone]         Routing refill request to provider for review/approval because:  2nd nury refill request.    Minnie Rao RN   [facility]

## 2019-01-19 NOTE — PROGRESS NOTES
Next 5 appointments (look out 90 days)    Jan 22, 2019  1:10 PM CST  PHYSICAL with SHAHRAM Ambriz CNP  [facility] ([facility]) [address] [address] [place] [id number]  [phone]        Done

## 2019-01-22 ENCOUNTER — OFFICE VISIT (OUTPATIENT)
Dept: PEDIATRICS | Facility: CLINIC | Age: 49
End: 2019-01-22
Payer: COMMERCIAL

## 2019-01-22 ENCOUNTER — ANCILLARY PROCEDURE (OUTPATIENT)
Dept: MAMMOGRAPHY | Facility: CLINIC | Age: 49
End: 2019-01-22
Payer: COMMERCIAL

## 2019-01-22 VITALS
WEIGHT: 190.6 LBS | SYSTOLIC BLOOD PRESSURE: 120 MMHG | HEIGHT: 65 IN | DIASTOLIC BLOOD PRESSURE: 70 MMHG | TEMPERATURE: 98.2 F | OXYGEN SATURATION: 99 % | HEART RATE: 76 BPM | BODY MASS INDEX: 31.75 KG/M2

## 2019-01-22 DIAGNOSIS — Z13.6 CARDIOVASCULAR SCREENING; LDL GOAL LESS THAN 130: ICD-10-CM

## 2019-01-22 DIAGNOSIS — Z13.29 SCREENING FOR THYROID DISORDER: ICD-10-CM

## 2019-01-22 DIAGNOSIS — I10 HTN, GOAL BELOW 140/90: ICD-10-CM

## 2019-01-22 DIAGNOSIS — Z00.00 ENCOUNTER FOR ROUTINE ADULT HEALTH EXAMINATION WITHOUT ABNORMAL FINDINGS: Primary | ICD-10-CM

## 2019-01-22 DIAGNOSIS — I10 ESSENTIAL HYPERTENSION WITH GOAL BLOOD PRESSURE LESS THAN 140/90: ICD-10-CM

## 2019-01-22 DIAGNOSIS — Z12.39 ENCOUNTER FOR SCREENING BREAST EXAMINATION: ICD-10-CM

## 2019-01-22 DIAGNOSIS — E87.6 HYPOKALEMIA: ICD-10-CM

## 2019-01-22 DIAGNOSIS — Z12.4 SCREENING FOR MALIGNANT NEOPLASM OF CERVIX: ICD-10-CM

## 2019-01-22 DIAGNOSIS — B00.9 RECURRENT HERPES SIMPLEX: ICD-10-CM

## 2019-01-22 DIAGNOSIS — Z13.1 SCREENING FOR DIABETES MELLITUS (DM): ICD-10-CM

## 2019-01-22 DIAGNOSIS — M51.369 DDD (DEGENERATIVE DISC DISEASE), LUMBAR: ICD-10-CM

## 2019-01-22 LAB
ALBUMIN SERPL-MCNC: 3.4 G/DL (ref 3.4–5)
ALP SERPL-CCNC: 80 U/L (ref 40–150)
ALT SERPL W P-5'-P-CCNC: 18 U/L (ref 0–50)
ANION GAP SERPL CALCULATED.3IONS-SCNC: 5 MMOL/L (ref 3–14)
AST SERPL W P-5'-P-CCNC: 15 U/L (ref 0–45)
BILIRUB SERPL-MCNC: 0.4 MG/DL (ref 0.2–1.3)
BUN SERPL-MCNC: 9 MG/DL (ref 7–30)
CALCIUM SERPL-MCNC: 8.6 MG/DL (ref 8.5–10.1)
CHLORIDE SERPL-SCNC: 105 MMOL/L (ref 94–109)
CHOLEST SERPL-MCNC: 250 MG/DL
CO2 SERPL-SCNC: 29 MMOL/L (ref 20–32)
CREAT SERPL-MCNC: 0.78 MG/DL (ref 0.52–1.04)
CREAT UR-MCNC: 32 MG/DL
GFR SERPL CREATININE-BSD FRML MDRD: 89 ML/MIN/{1.73_M2}
GLUCOSE SERPL-MCNC: 95 MG/DL (ref 70–99)
HDLC SERPL-MCNC: 99 MG/DL
LDLC SERPL CALC-MCNC: 134 MG/DL
MICROALBUMIN UR-MCNC: <5 MG/L
MICROALBUMIN/CREAT UR: NORMAL MG/G CR (ref 0–25)
NONHDLC SERPL-MCNC: 151 MG/DL
POTASSIUM SERPL-SCNC: 3.3 MMOL/L (ref 3.4–5.3)
PROT SERPL-MCNC: 6.8 G/DL (ref 6.8–8.8)
SODIUM SERPL-SCNC: 139 MMOL/L (ref 133–144)
TRIGL SERPL-MCNC: 83 MG/DL
TSH SERPL DL<=0.005 MIU/L-ACNC: 1.43 MU/L (ref 0.4–4)

## 2019-01-22 PROCEDURE — 84443 ASSAY THYROID STIM HORMONE: CPT | Performed by: NURSE PRACTITIONER

## 2019-01-22 PROCEDURE — 99396 PREV VISIT EST AGE 40-64: CPT | Performed by: NURSE PRACTITIONER

## 2019-01-22 PROCEDURE — G0145 SCR C/V CYTO,THINLAYER,RESCR: HCPCS | Performed by: NURSE PRACTITIONER

## 2019-01-22 PROCEDURE — 77067 SCR MAMMO BI INCL CAD: CPT | Performed by: RADIOLOGY

## 2019-01-22 PROCEDURE — 87624 HPV HI-RISK TYP POOLED RSLT: CPT | Performed by: NURSE PRACTITIONER

## 2019-01-22 PROCEDURE — 36415 COLL VENOUS BLD VENIPUNCTURE: CPT | Performed by: NURSE PRACTITIONER

## 2019-01-22 PROCEDURE — 80053 COMPREHEN METABOLIC PANEL: CPT | Performed by: NURSE PRACTITIONER

## 2019-01-22 PROCEDURE — 82043 UR ALBUMIN QUANTITATIVE: CPT | Performed by: NURSE PRACTITIONER

## 2019-01-22 PROCEDURE — 80061 LIPID PANEL: CPT | Performed by: NURSE PRACTITIONER

## 2019-01-22 RX ORDER — ESCITALOPRAM OXALATE 20 MG/1
1 TABLET ORAL DAILY
Refills: 0 | COMMUNITY
Start: 2019-01-05 | End: 2020-07-13

## 2019-01-22 RX ORDER — HYDROCHLOROTHIAZIDE 25 MG/1
25 TABLET ORAL DAILY
Qty: 90 TABLET | Refills: 3 | Status: SHIPPED | OUTPATIENT
Start: 2019-01-22 | End: 2020-02-12

## 2019-01-22 RX ORDER — POTASSIUM CHLORIDE 1500 MG/1
20 TABLET, EXTENDED RELEASE ORAL DAILY
Qty: 90 TABLET | Refills: 3 | Status: SHIPPED | OUTPATIENT
Start: 2019-01-22 | End: 2020-01-16

## 2019-01-22 RX ORDER — VALACYCLOVIR HYDROCHLORIDE 500 MG/1
500 TABLET, FILM COATED ORAL DAILY
Qty: 90 TABLET | Refills: 3 | Status: SHIPPED | OUTPATIENT
Start: 2019-01-22 | End: 2020-01-16

## 2019-01-22 RX ORDER — HYDROCODONE BITARTRATE AND ACETAMINOPHEN 5; 325 MG/1; MG/1
1 TABLET ORAL EVERY 6 HOURS PRN
Qty: 15 TABLET | Refills: 0 | Status: SHIPPED | OUTPATIENT
Start: 2019-01-31 | End: 2019-02-25

## 2019-01-22 ASSESSMENT — MIFFLIN-ST. JEOR: SCORE: 1490.44

## 2019-01-22 NOTE — PATIENT INSTRUCTIONS
PLAN:   1.   Symptomatic therapy suggested: Increase calcium to 1000mg and 800iu Vit D  2.  Orders Placed This Encounter   Medications     cholecalciferol (VITAMIN D3) 5000 units TABS tablet     Sig: Take 5,000 Units by mouth daily     escitalopram (LEXAPRO) 20 MG tablet     Sig: Take 1 tablet by mouth daily     Refill:  0     valACYclovir (VALTREX) 500 MG tablet     Sig: Take 1 tablet (500 mg) by mouth daily     Dispense:  90 tablet     Refill:  3     hydrochlorothiazide (HYDRODIURIL) 25 MG tablet     Sig: Take 1 tablet (25 mg) by mouth daily     Dispense:  90 tablet     Refill:  3     HYDROcodone-acetaminophen (NORCO) 5-325 MG tablet     Sig: Take 1 tablet by mouth every 6 hours as needed for pain     Dispense:  15 tablet     Refill:  0     potassium chloride ER (K-DUR/KLOR-CON M) 20 MEQ CR tablet     Sig: Take 1 tablet (20 mEq) by mouth daily     Dispense:  90 tablet     Refill:  3     Orders Placed This Encounter   Procedures     MA Screening Digital Bilateral       3. Patient needs to follow up in if no improvement,or sooner if worsening of symptoms or other symptoms develop.  FURTHER TESTING:       - mammogram  Will follow up and/or notify patient of  results via My Chart to determine further need for followup  Follow up office visit in one year for annual health maintenance exam, sooner PRN.    Preventive Health Recommendations  Female Ages 40 to 49    Yearly exam:     See your health care provider every year in order to  1. Review health changes.   2. Discuss preventive care.    3. Review your medicines if your doctor prescribed any.      Get a Pap test every three years (unless you have an abnormal result and your provider advises testing more often).      If you get Pap tests with HPV test, you only need to test every 5 years, unless you have an abnormal result. You do not need a Pap test if your uterus was removed (hysterectomy) and you have not had cancer.      You should be tested each year for STDs  (sexually transmitted diseases), if you're at risk.     Ask your doctor if you should have a mammogram.      Have a colonoscopy (test for colon cancer) if someone in your family has had colon cancer or polyps before age 50.       Have a cholesterol test every 5 years.       Have a diabetes test (fasting glucose) after age 45. If you are at risk for diabetes, you should have this test every 3 years.    Shots: Get a flu shot each year. Get a tetanus shot every 10 years.     Nutrition:     Eat at least 5 servings of fruits and vegetables each day.    Eat whole-grain bread, whole-wheat pasta and brown rice instead of white grains and rice.    Get adequate Calcium and Vitamin D.      Lifestyle    Exercise at least 150 minutes a week (an average of 30 minutes a day, 5 days a week). This will help you control your weight and prevent disease.    Limit alcohol to one drink per day.    No smoking.     Wear sunscreen to prevent skin cancer.    See your dentist every six months for an exam and cleaning.
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
## 2019-01-22 NOTE — NURSING NOTE
"Chief Complaint   Patient presents with     Physical     AFE       Initial /70 (BP Location: Right arm, Patient Position: Sitting, Cuff Size: Adult Large)   Pulse 76   Temp 98.2  F (36.8  C) (Temporal)   Ht 1.651 m (5' 5\")   Wt 86.5 kg (190 lb 9.6 oz)   LMP 01/20/2019   SpO2 99%   Breastfeeding? No   BMI 31.72 kg/m   Estimated body mass index is 31.72 kg/m  as calculated from the following:    Height as of this encounter: 1.651 m (5' 5\").    Weight as of this encounter: 86.5 kg (190 lb 9.6 oz).  Medication Reconciliation: complete      VINCE Lepe      "

## 2019-01-23 NOTE — RESULT ENCOUNTER NOTE
Gilbert Maher,    Attached are your test results.  -LDL(bad) cholesterol level is elevated which can increase your heart disease risk.  A diet high in fat and simple carbohydrates, genetics and being overweight can contribute to this. ADVISE: exercising 150 minutes of aerobic exercise per week (30 minutes for 5 days per week or 50 minutes for 3 days per week are options) and eating a low saturated fat/low carbohydrate diet are helpful to improve this. In 12 months, you should recheck your fasting cholesterol panel by scheduling a lab-only appointment.  -Liver and gallbladder tests (ALT,AST, Alk phos,bilirubin) are normal.  -Kidney function (GFR) is normal.  -Sodium is normal.  -Potassium is decreased.  ADVISE: starting potassium 20 meq daily (a prescription was sent in to your pharmacy) and then rechecking this in 1 month.  -Calcium is normal.  -Glucose (diabetic screening test) is normal.  -TSH (thyroid stimulating hormone) level is normal which indicates normal thyroid function.  -Microalbumin (urine protein) test is normal.  ADVISE: rechecking this annually.   Please contact us if you have any questions.    Jaz Torres, CNP

## 2019-01-24 LAB
COPATH REPORT: NORMAL
PAP: NORMAL

## 2019-01-25 LAB
FINAL DIAGNOSIS: NORMAL
HPV HR 12 DNA CVX QL NAA+PROBE: NEGATIVE
HPV16 DNA SPEC QL NAA+PROBE: NEGATIVE
HPV18 DNA SPEC QL NAA+PROBE: NEGATIVE
SPECIMEN DESCRIPTION: NORMAL
SPECIMEN SOURCE CVX/VAG CYTO: NORMAL

## 2019-02-25 ENCOUNTER — OFFICE VISIT (OUTPATIENT)
Dept: PEDIATRICS | Facility: CLINIC | Age: 49
End: 2019-02-25
Payer: COMMERCIAL

## 2019-02-25 ENCOUNTER — ANCILLARY PROCEDURE (OUTPATIENT)
Dept: GENERAL RADIOLOGY | Facility: CLINIC | Age: 49
End: 2019-02-25
Attending: NURSE PRACTITIONER
Payer: COMMERCIAL

## 2019-02-25 VITALS
WEIGHT: 199 LBS | SYSTOLIC BLOOD PRESSURE: 101 MMHG | HEART RATE: 74 BPM | TEMPERATURE: 97.5 F | OXYGEN SATURATION: 100 % | BODY MASS INDEX: 33.12 KG/M2 | DIASTOLIC BLOOD PRESSURE: 60 MMHG

## 2019-02-25 DIAGNOSIS — N83.202 LEFT OVARIAN CYST: ICD-10-CM

## 2019-02-25 DIAGNOSIS — R10.31 ABDOMINAL PAIN, RIGHT LOWER QUADRANT: ICD-10-CM

## 2019-02-25 DIAGNOSIS — D64.9 ANEMIA, UNSPECIFIED TYPE: ICD-10-CM

## 2019-02-25 DIAGNOSIS — R10.31 ABDOMINAL PAIN, RIGHT LOWER QUADRANT: Primary | ICD-10-CM

## 2019-02-25 DIAGNOSIS — R19.7 DIARRHEA, UNSPECIFIED TYPE: ICD-10-CM

## 2019-02-25 DIAGNOSIS — E87.6 HYPOKALEMIA: ICD-10-CM

## 2019-02-25 DIAGNOSIS — M51.369 DDD (DEGENERATIVE DISC DISEASE), LUMBAR: ICD-10-CM

## 2019-02-25 LAB
ALBUMIN SERPL-MCNC: 3.6 G/DL (ref 3.4–5)
ALBUMIN UR-MCNC: NEGATIVE MG/DL
ALP SERPL-CCNC: 86 U/L (ref 40–150)
ALT SERPL W P-5'-P-CCNC: 20 U/L (ref 0–50)
ANION GAP SERPL CALCULATED.3IONS-SCNC: 7 MMOL/L (ref 3–14)
APPEARANCE UR: CLEAR
AST SERPL W P-5'-P-CCNC: 15 U/L (ref 0–45)
BASOPHILS # BLD AUTO: 0.1 10E9/L (ref 0–0.2)
BASOPHILS NFR BLD AUTO: 1.5 %
BILIRUB SERPL-MCNC: 0.2 MG/DL (ref 0.2–1.3)
BILIRUB UR QL STRIP: NEGATIVE
BUN SERPL-MCNC: 14 MG/DL (ref 7–30)
CALCIUM SERPL-MCNC: 8.4 MG/DL (ref 8.5–10.1)
CHLORIDE SERPL-SCNC: 108 MMOL/L (ref 94–109)
CO2 SERPL-SCNC: 26 MMOL/L (ref 20–32)
COLOR UR AUTO: ABNORMAL
CREAT SERPL-MCNC: 0.83 MG/DL (ref 0.52–1.04)
DIFFERENTIAL METHOD BLD: ABNORMAL
EOSINOPHIL # BLD AUTO: 0.3 10E9/L (ref 0–0.7)
EOSINOPHIL NFR BLD AUTO: 3.4 %
ERYTHROCYTE [DISTWIDTH] IN BLOOD BY AUTOMATED COUNT: 15.6 % (ref 10–15)
GFR SERPL CREATININE-BSD FRML MDRD: 83 ML/MIN/{1.73_M2}
GLUCOSE SERPL-MCNC: 75 MG/DL (ref 70–99)
GLUCOSE UR STRIP-MCNC: NEGATIVE MG/DL
HCT VFR BLD AUTO: 32.8 % (ref 35–47)
HGB BLD-MCNC: 9.4 G/DL (ref 11.7–15.7)
HGB UR QL STRIP: ABNORMAL
IMM GRANULOCYTES # BLD: 0 10E9/L (ref 0–0.4)
IMM GRANULOCYTES NFR BLD: 0.1 %
KETONES UR STRIP-MCNC: NEGATIVE MG/DL
LEUKOCYTE ESTERASE UR QL STRIP: NEGATIVE
LYMPHOCYTES # BLD AUTO: 3.9 10E9/L (ref 0.8–5.3)
LYMPHOCYTES NFR BLD AUTO: 46.6 %
MCH RBC QN AUTO: 21.8 PG (ref 26.5–33)
MCHC RBC AUTO-ENTMCNC: 28.7 G/DL (ref 31.5–36.5)
MCV RBC AUTO: 76 FL (ref 78–100)
MONOCYTES # BLD AUTO: 1.1 10E9/L (ref 0–1.3)
MONOCYTES NFR BLD AUTO: 13.4 %
NEUTROPHILS # BLD AUTO: 3 10E9/L (ref 1.6–8.3)
NEUTROPHILS NFR BLD AUTO: 35 %
NITRATE UR QL: NEGATIVE
PH UR STRIP: 5.5 PH (ref 5–7)
PLATELET # BLD AUTO: 359 10E9/L (ref 150–450)
POTASSIUM SERPL-SCNC: 3.7 MMOL/L (ref 3.4–5.3)
PROT SERPL-MCNC: 6.9 G/DL (ref 6.8–8.8)
RBC # BLD AUTO: 4.31 10E12/L (ref 3.8–5.2)
RBC #/AREA URNS AUTO: ABNORMAL /HPF
SODIUM SERPL-SCNC: 141 MMOL/L (ref 133–144)
SOURCE: ABNORMAL
SP GR UR STRIP: 1.01 (ref 1–1.03)
UROBILINOGEN UR STRIP-MCNC: NORMAL MG/DL (ref 0–2)
WBC # BLD AUTO: 8.5 10E9/L (ref 4–11)
WBC #/AREA URNS AUTO: ABNORMAL /HPF

## 2019-02-25 PROCEDURE — 80053 COMPREHEN METABOLIC PANEL: CPT | Performed by: NURSE PRACTITIONER

## 2019-02-25 PROCEDURE — 85025 COMPLETE CBC W/AUTO DIFF WBC: CPT | Performed by: NURSE PRACTITIONER

## 2019-02-25 PROCEDURE — 82728 ASSAY OF FERRITIN: CPT | Performed by: NURSE PRACTITIONER

## 2019-02-25 PROCEDURE — 99214 OFFICE O/P EST MOD 30 MIN: CPT | Performed by: NURSE PRACTITIONER

## 2019-02-25 PROCEDURE — 82607 VITAMIN B-12: CPT | Performed by: NURSE PRACTITIONER

## 2019-02-25 PROCEDURE — 83540 ASSAY OF IRON: CPT | Performed by: NURSE PRACTITIONER

## 2019-02-25 PROCEDURE — 81001 URINALYSIS AUTO W/SCOPE: CPT | Performed by: NURSE PRACTITIONER

## 2019-02-25 PROCEDURE — 83550 IRON BINDING TEST: CPT | Performed by: NURSE PRACTITIONER

## 2019-02-25 PROCEDURE — 36415 COLL VENOUS BLD VENIPUNCTURE: CPT | Performed by: NURSE PRACTITIONER

## 2019-02-25 PROCEDURE — 82746 ASSAY OF FOLIC ACID SERUM: CPT | Performed by: NURSE PRACTITIONER

## 2019-02-25 PROCEDURE — 74019 RADEX ABDOMEN 2 VIEWS: CPT | Performed by: RADIOLOGY

## 2019-02-25 RX ORDER — HYDROCODONE BITARTRATE AND ACETAMINOPHEN 5; 325 MG/1; MG/1
1 TABLET ORAL EVERY 6 HOURS PRN
Qty: 15 TABLET | Refills: 0 | Status: SHIPPED | OUTPATIENT
Start: 2019-02-28 | End: 2019-03-29

## 2019-02-25 NOTE — PROGRESS NOTES
SUBJECTIVE:   Pita Maher is a 49 year old female who presents to clinic today for the following health issues:    ABDOMINAL and FLANK   PAIN     Onset: 4-5 weeks    Description:   Character: Sharp  Location: right lower quadrant  Radiation: to the Back today    Intensity: moderate    Progression of Symptoms:  worsening    Accompanying Signs & Symptoms:  Fever/Chills?: YES- chills, one episode with SOB  Gas/Bloating: YES  Nausea: no   Vomitting: no   Diarrhea?: YES  Constipation:no   Dysuria or Hematuria: no    History:   Trauma: no   Previous similar pain: no    Previous tests done: none    Precipitating factors:   Does the pain change with:     Food: no      BM: no     Urination: no     Alleviating factors:  none    Therapies Tried and outcome: none    LMP:  19   Has been having diarrhea about 3 to 5 stools a day for at least 4 to 5 weeks   Has a lot of gurgling and gas  Woke up in morning and had stool in underwear and did not even realize   Is more sensitive since loss of her     a week ago had pain on the right side   Did go for husbands  in [place] last November  Feels bloated for at least 4 weeks        Problem list and histories reviewed & adjusted, as indicated.  Additional history: as documented    Patient Active Problem List   Diagnosis     Insomnia     Bariatric surgery status     Chronic headache     Recurrent herpes simplex     CARDIOVASCULAR SCREENING; LDL GOAL LESS THAN 130     Obesity, Class I, BMI 30-34.9     Adjustment disorder with anxiety     Pyelonephritis     ACP (advance care planning)     HTN, goal below 140/90     Rotator cuff disorder     S/P rotator cuff repair     Rotator cuff (capsule) sprain     Rotator cuff tear     DDD (degenerative disc disease), lumbar     DDD (degenerative disc disease), cervical     Abnormal TSH     Past Surgical History:   Procedure Laterality Date     ARTHROSCOPIC RECONSTRUCTION ANTERIOR AND POSTERIOR CRUCIATE LIGAMENT, COMBINED        BACK SURGERY  2014    steroid injections     BACK SURGERY  2014    steroid injections     BIOPSY  1991 & 1992    left ear & left breast     BREAST SURGERY  1993    lump removal     BREAST SURGERY  1992    lump removal     COLONOSCOPY  1/22/2013    Procedure: COLONOSCOPY;  colonoscopy, loose stools, blood in stool;  Surgeon: Kamari Solomon MD;  Location: [facility]     CRYOTHERAPY  20 years ago     DILATION AND CURETTAGE SUCTION  2/24/2012    Procedure:DILATION AND CURETTAGE SUCTION; DILATION, CURETTAGE AND SUCTION ; Surgeon:JOHN HORNER; Location:[facility]     ENT SURGERY  1974    tonsils removed     LAPAROSCOPIC BYPASS GASTRIC       lump removed from left ear[       SURGICAL HISTORY OF -   1989    lt knee acl reconstruction     SURGICAL HISTORY OF -   2001    lt breast lump removal     SURGICAL HISTORY OF -   1999    gastric bypass ([place]atie en Y) by Dr. Fontenot at [facility]     SURGICAL HISTORY OF -   1974    tonsillectomy     SURGICAL HISTORY OF -   2005    LASIK       Social History     Tobacco Use     Smoking status: Never Smoker     Smokeless tobacco: Never Used     Tobacco comment: EX- SMOKED IN HOUSE FOR PAST 10 YEARS   Substance Use Topics     Alcohol use: No     Alcohol/week: 0.0 oz     Family History   Problem Relation Age of Onset     Lipids Mother      Hypertension Mother      Anesthesia Reaction Mother      Hyperlipidemia Mother      C.A.D. Father         first MI at 40 years old (was a smoker)     Lipids Father      Heart Disease Father      Hypertension Father      C.A.D. Maternal Grandmother         not premature presentation     Cancer Maternal Grandmother         skin     C.A.D. Paternal Grandmother         not premature presentation     Diabetes Paternal Grandmother      Hypertension Paternal Grandmother      Anxiety Disorder Brother      Mental Illness Other      Breast Cancer No family hx of      Cancer - colorectal No family hx of          Current Outpatient Medications    Medication Sig Dispense Refill     cholecalciferol (VITAMIN D3) 5000 units TABS tablet Take 5,000 Units by mouth daily       escitalopram (LEXAPRO) 20 MG tablet Take 1 tablet by mouth daily  0     hydrochlorothiazide (HYDRODIURIL) 25 MG tablet Take 1 tablet (25 mg) by mouth daily 90 tablet 3     HYDROcodone-acetaminophen (NORCO) 5-325 MG tablet Take 1 tablet by mouth every 6 hours as needed for pain 15 tablet 0     phentermine 15 MG capsule Take 15 mg by mouth every morning       potassium chloride ER (K-DUR/KLOR-CON M) 20 MEQ CR tablet Take 1 tablet (20 mEq) by mouth daily 90 tablet 3     QUETIAPINE FUMARATE PO Take 50 mg by mouth At Bedtime        topiramate (TOPAMAX) 25 MG tablet Take 25 mg by mouth 5 tablets at bedtime       valACYclovir (VALTREX) 500 MG tablet Take 1 tablet (500 mg) by mouth daily 90 tablet 3     Allergies   Allergen Reactions     Lomotil      BP Readings from Last 3 Encounters:   02/25/19 101/60   01/22/19 120/70   04/30/18 120/76    Wt Readings from Last 3 Encounters:   02/25/19 90.3 kg (199 lb)   01/22/19 86.5 kg (190 lb 9.6 oz)   04/30/18 90.9 kg (200 lb 8 oz)                  Labs reviewed in EPIC    Reviewed and updated as needed this visit by clinical staff       Reviewed and updated as needed this visit by Provider         ROS:  CONSTITUTIONAL:NEGATIVE for fever, chills, change in weight  ENT/MOUTH: NEGATIVE for ear, mouth and throat problems  RESP:NEGATIVE for significant cough or SOB  CV: NEGATIVE for chest pain, palpitations or peripheral edema  GI: POSITIVE for abdominal pain RLQ and center lower abdomen and diarrhea and NEGATIVE for jaundice, melena, nausea, vomiting and weight loss  : negative for and abnormal menstrual cycles  MUSCULOSKELETAL: POSITIVE  for back pain chronic  and NEGATIVE for joint swelling  and joint warmth   NEURO: NEGATIVE for weakness, dizziness or paresthesias  ENDOCRINE: NEGATIVE for temperature intolerance, skin/hair changes and POSITIVE  for cold  intolerance    OBJECTIVE:     /60 (BP Location: Right arm, Patient Position: Sitting, Cuff Size: Adult Large)   Pulse 74   Temp 97.5  F (36.4  C) (Temporal)   Wt 90.3 kg (199 lb)   LMP 02/24/2019   SpO2 100%   Breastfeeding? No   BMI 33.12 kg/m    Body mass index is 33.12 kg/m .  GENERAL APPEARANCE: alert, active and no distress  HENT: oral mucous membranes moist  NECK: normal and supple  RESP: lungs clear to auscultation - no rales, rhonchi or wheezes  CV: regular rates and rhythm and no murmur, click or rub  ABDOMEN: mild and moderate tenderness in the RLQ area, no rebound tenderness, no guarding or rigidity, no CVA tenderness, no herniae noted, no masses noted  MS: extremities normal- no gross deformities noted  SKIN: no suspicious lesions or rashes  NEURO: Normal strength and tone, mentation intact and speech normal  PSYCH: mentation appears normal and affect normal/bright  MENTAL STATUS EXAM:  Appearance/Behavior: No apparent distress, Casually groomed and Dressed appropriately for weather  Speech: Normal  Mood/Affect: normal affect  Insight: Adequate    Diagnostic Test Results:  Results for orders placed or performed in visit on 02/25/19   UA with Microscopic reflex to Culture   Result Value Ref Range    Color Urine Straw     Appearance Urine Clear     Glucose Urine Negative NEG^Negative mg/dL    Bilirubin Urine Negative NEG^Negative    Ketones Urine Negative NEG^Negative mg/dL    Specific Gravity Urine 1.008 1.003 - 1.035    Blood Urine Moderate (A) NEG^Negative    pH Urine 5.5 5.0 - 7.0 pH    Protein Albumin Urine Negative NEG^Negative mg/dL    Urobilinogen mg/dL Normal 0.0 - 2.0 mg/dL    Nitrite Urine Negative NEG^Negative    Leukocyte Esterase Urine Negative NEG^Negative    Source Midstream Urine     WBC Urine 0 - 5 OTO5^0 - 5 /HPF    RBC Urine 10-25 (A) OTO2^O - 2 /HPF   CBC with platelets differential   Result Value Ref Range    WBC 8.5 4.0 - 11.0 10e9/L    RBC Count 4.31 3.8 - 5.2 10e12/L     Hemoglobin 9.4 (L) 11.7 - 15.7 g/dL    Hematocrit 32.8 (L) 35.0 - 47.0 %    MCV 76 (L) 78 - 100 fl    MCH 21.8 (L) 26.5 - 33.0 pg    MCHC 28.7 (L) 31.5 - 36.5 g/dL    RDW 15.6 (H) 10.0 - 15.0 %    Platelet Count 359 150 - 450 10e9/L    Diff Method Automated Method     % Neutrophils 35.0 %    % Lymphocytes 46.6 %    % Monocytes 13.4 %    % Eosinophils 3.4 %    % Basophils 1.5 %    % Immature Granulocytes 0.1 %    Absolute Neutrophil 3.0 1.6 - 8.3 10e9/L    Absolute Lymphocytes 3.9 0.8 - 5.3 10e9/L    Absolute Monocytes 1.1 0.0 - 1.3 10e9/L    Absolute Eosinophils 0.3 0.0 - 0.7 10e9/L    Absolute Basophils 0.1 0.0 - 0.2 10e9/L    Abs Immature Granulocytes 0.0 0 - 0.4 10e9/L   Comprehensive metabolic panel   Result Value Ref Range    Sodium 141 133 - 144 mmol/L    Potassium 3.7 3.4 - 5.3 mmol/L    Chloride 108 94 - 109 mmol/L    Carbon Dioxide 26 20 - 32 mmol/L    Anion Gap 7 3 - 14 mmol/L    Glucose 75 70 - 99 mg/dL    Urea Nitrogen 14 7 - 30 mg/dL    Creatinine 0.83 0.52 - 1.04 mg/dL    GFR Estimate 83 >60 mL/min/[1.73_m2]    GFR Estimate If Black >90 >60 mL/min/[1.73_m2]    Calcium 8.4 (L) 8.5 - 10.1 mg/dL    Bilirubin Total 0.2 0.2 - 1.3 mg/dL    Albumin 3.6 3.4 - 5.0 g/dL    Protein Total 6.9 6.8 - 8.8 g/dL    Alkaline Phosphatase 86 40 - 150 U/L    ALT 20 0 - 50 U/L    AST 15 0 - 45 U/L   Iron and iron binding capacity   Result Value Ref Range    Iron 16 (L) 35 - 180 ug/dL    Iron Binding Cap 471 (H) 240 - 430 ug/dL    Iron Saturation Index 3 (L) 15 - 46 %   Ferritin   Result Value Ref Range    Ferritin 4 (L) 8 - 252 ng/mL   Vitamin B12   Result Value Ref Range    Vitamin B12 148 (L) 193 - 986 pg/mL   Folate   Result Value Ref Range    Folate 7.0 >5.4 ng/mL     On menses     Recent Results (from the past 744 hour(s))   XR Abdomen 2 Views    Narrative    EXAMINATION:  XR ABDOMEN 2 VW 2/25/2019 6:50 PM.    COMPARISON: CT abdomen and pelvis 8/30/2012.    HISTORY:  Abdominal pain, right lower quadrant    FINDINGS:  Supine and upright frontal radiographs of the abdomen and  pelvis. Nonobstructive bowel gas pattern. Small to moderate colonic  stool burden. No pneumatosis or portal venous gas. No free  intraperitoneal air. No definite radiopaque stone projecting over the  kidneys. No acute osseous lesion. Mild thoracolumbar scoliosis.  Lateral subluxation of L4 on L5.      Impression    IMPRESSION: No radiographic finding to explain right lower quadrant  symptoms.    I have personally reviewed the examination and initial interpretation  and I agree with the findings.    NAM LYNN MD   CT Abdomen Pelvis w Contrast    Narrative    EXAMINATION: CT ABDOMEN PELVIS W CONTRAST, 3/1/2019 10:00 AM.    TECHNIQUE: Helical CT images from the lung bases through the symphysis  pubis were obtained with IV contrast. Contrast dose: 101 ml isovue  370.    COMPARISON: 8/30/2012, 11/12/2011.    HISTORY: Abd pain, unspecified; Abdominal pain, right lower quadrant.    FINDINGS:    Abdomen and pelvis:   Mildly hypoattenuating hepatic parenchyma. Tiny hypodensity in segment  8 is too small to characterize, likely simple cyst. The gallbladder,  spleen, adrenal glands, and pancreas are normal. Simple renal cysts in  the left kidney. No hydronephrosis. Scattered nabothian cysts. 4.4  unilocular simple cystic focus in the left adnexa. Mild urinary  bladder wall thickening, although the bladder is decompressed.    No intra-abdominal free air or free fluid. No dilated loops of bowel.  Moderate stool burden. The appendix is normal. Normal caliber  abdominal aorta. The major abdominal vasculature is patent. No  lymphadenopathy in the abdomen or pelvis.    Lower chest:   The heart is not enlarged. No pericardial effusion. The lung bases are  clear.    Bones and soft tissues:   Fat-containing ventral hernia. Levorotoscoliosis of the lumbar spine  with loss of intervertebral disc space and significant apposing  vertebral endplate sclerosis at L4-5. No  acute or worrisome osseous  lesions.        Impression    IMPRESSION:   1. No acute pathology identified in the abdomen or pelvis.  2. Unilocular left adnexal cystic focus measuring 4.4 cm, likely  representing simple ovarian cyst and not requiring follow-up if the  patient is premenopausal.  3. Fat-containing ventral hernia.    I have personally reviewed the examination and initial interpretation  and I agree with the findings.    JEFF DREW MD       ASSESSMENT/PLAN:     Pita was seen today for abdominal pain.    Diagnoses and all orders for this visit:    Abdominal pain, right lower quadrant  -     UA with Microscopic reflex to Culture  -     XR Abdomen 2 Views; Future  -     CT Abdomen Pelvis w Contrast; Future  -     CBC with platelets differential  -     Comprehensive metabolic panel    Diarrhea, unspecified type  -     Ova and Parasite Exam Routine; Future  -     Enteric Bacteria and Virus Panel by [facility] Stool; Future  -     Clostridium difficile toxin B PCR; Future    DDD (degenerative disc disease), lumbar  -     HYDROcodone-acetaminophen (NORCO) 5-325 MG tablet; Take 1 tablet by mouth every 6 hours as needed for pain    Hypokalemia  -     Cancel: Basic metabolic panel    Anemia, unspecified type  -     Iron and iron binding capacity  -     Ferritin  -     Vitamin B12  -     Folate  -     CBC with platelets; Future  -     Ferritin; Future  -     ferrous sulfate (FEROSUL) 325 (65 Fe) MG tablet; Take 1 tablet (325 mg) by mouth daily (with breakfast)    Left ovarian cyst  -     US Pelvic Complete w Transvaginal; Future  Will follow up and/or notify patient of  results via My Chart to determine further need for followup    See Patient Instructions  Patient Instructions     PLAN:   1.   Orders Placed This Encounter   Medications     HYDROcodone-acetaminophen (NORCO) 5-325 MG tablet     Sig: Take 1 tablet by mouth every 6 hours as needed for pain     Dispense:  15 tablet     Refill:  0     Orders Placed This  Encounter   Procedures     XR Abdomen 2 Views     CT Abdomen Pelvis w Contrast     UA with Microscopic reflex to Culture     CBC with platelets differential     Comprehensive metabolic panel       2. Patient needs to follow up in if no improvement,or sooner if worsening of symptoms or other symptoms develop.  FURTHER TESTING:       - CT abdomen and pelvis  I will place order. Please call [phone] to schedule.  Will follow up and/or notify patient of  results via My Chart to determine further need for followup    SHAHRAM Ambriz CNP  [facility]

## 2019-02-25 NOTE — PATIENT INSTRUCTIONS
PLAN:   1.   Orders Placed This Encounter   Medications     HYDROcodone-acetaminophen (NORCO) 5-325 MG tablet     Sig: Take 1 tablet by mouth every 6 hours as needed for pain     Dispense:  15 tablet     Refill:  0     Orders Placed This Encounter   Procedures     XR Abdomen 2 Views     CT Abdomen Pelvis w Contrast     UA with Microscopic reflex to Culture     CBC with platelets differential     Comprehensive metabolic panel       2. Patient needs to follow up in if no improvement,or sooner if worsening of symptoms or other symptoms develop.  FURTHER TESTING:       - CT abdomen and pelvis  I will place order. Please call [phone] to schedule.  Will follow up and/or notify patient of  results via My Chart to determine further need for followup

## 2019-02-25 NOTE — NURSING NOTE
"Chief Complaint   Patient presents with     Abdominal Pain     abdominal pain, bloating, diarrhea x 4-5 weeks       Initial /60 (BP Location: Right arm, Patient Position: Sitting, Cuff Size: Adult Large)   Pulse 74   Temp 97.5  F (36.4  C) (Temporal)   Wt 90.3 kg (199 lb)   LMP 02/24/2019   SpO2 100%   Breastfeeding? No   BMI 33.12 kg/m   Estimated body mass index is 33.12 kg/m  as calculated from the following:    Height as of 1/22/19: 1.651 m (5' 5\").    Weight as of this encounter: 90.3 kg (199 lb).  Medication Reconciliation: complete      VINCE Lepe      "

## 2019-02-26 LAB
FERRITIN SERPL-MCNC: 4 NG/ML (ref 8–252)
FOLATE SERPL-MCNC: 7 NG/ML
IRON SATN MFR SERPL: 3 % (ref 15–46)
IRON SERPL-MCNC: 16 UG/DL (ref 35–180)
TIBC SERPL-MCNC: 471 UG/DL (ref 240–430)
VIT B12 SERPL-MCNC: 148 PG/ML (ref 193–986)

## 2019-02-26 RX ORDER — FERROUS SULFATE 325(65) MG
325 TABLET ORAL
Qty: 60 TABLET | Refills: 2 | Status: SHIPPED | OUTPATIENT
Start: 2019-02-26 | End: 2019-09-11

## 2019-02-26 NOTE — RESULT ENCOUNTER NOTE
Gilbert Maehr,    Attached are your test results.  -Hemoglobin is decreased indicating anemia.  Anemia can cause fatigue and, occasionally, light-headedness.  This is common in menstruating women and is usually caused by an iron deficiency.  ADVISE: eating a diet has a lot of iron-rich foods such as lean red meat and green, leafy vegetables.  You should take a  daily iron supplement (ferrous gluconate 325mg.) Then, rechecking this lab in 1 months.  -White blood cell and platelet counts are normal.  -Liver and gallbladder tests are normal (ALT,AST, Alk phos, bilirubin), kidney function is normal (Cr, GFR), sodium is normal, potassium is normal, calcium is normal, glucose is normal.   Please contact us if you have any questions.    Jaz Torres, CNP

## 2019-02-26 NOTE — RESULT ENCOUNTER NOTE
Gilbert Maher,    Attached are your test results.  -Low iron store levels (ferritin).  ADVISE: increasing iron in your diet and consider taking iron supplement for 1 months (ferrous gluconate 325 mg daily - to avoid constipation from the supplement you should increase fluid intake and fiber in your diet)  Also, recheck your labs in 1 months.   Please contact us if you have any questions.    Jaz Torres, CNP

## 2019-02-26 NOTE — RESULT ENCOUNTER NOTE
Gilbert Maher,    Attached are your test results.  Abdomen xray is normal    Please contact us if you have any questions.    Jaz Torres, CNP

## 2019-02-27 NOTE — RESULT ENCOUNTER NOTE
Gilbert Maher,    Attached are your test results.  Need to start back on your Vitamin B12 supplements   Should be taking at least 1000 mcg a day    Please contact us if you have any questions.  Jaz Torres, CNP

## 2019-02-28 ENCOUNTER — DOCUMENTATION ONLY (OUTPATIENT)
Dept: OTHER | Facility: CLINIC | Age: 49
End: 2019-02-28

## 2019-03-01 ENCOUNTER — ANCILLARY PROCEDURE (OUTPATIENT)
Dept: CT IMAGING | Facility: CLINIC | Age: 49
End: 2019-03-01
Attending: NURSE PRACTITIONER
Payer: COMMERCIAL

## 2019-03-01 DIAGNOSIS — R10.31 ABDOMINAL PAIN, RIGHT LOWER QUADRANT: ICD-10-CM

## 2019-03-01 PROCEDURE — 74177 CT ABD & PELVIS W/CONTRAST: CPT | Performed by: RADIOLOGY

## 2019-03-01 RX ORDER — IOPAMIDOL 755 MG/ML
122 INJECTION, SOLUTION INTRAVASCULAR ONCE
Status: COMPLETED | OUTPATIENT
Start: 2019-03-01 | End: 2019-03-01

## 2019-03-01 RX ADMIN — IOPAMIDOL 122 ML: 755 INJECTION, SOLUTION INTRAVASCULAR at 09:40

## 2019-03-02 NOTE — RESULT ENCOUNTER NOTE
Gilbert Maher,    Attached are your test results.  Scan is normal appearing except for what looks to be a cyst on ovary on the left   Could consider ultrasound to evaluate on the left side   I will place order. Please call [phone] to schedule.     Please contact us if you have any questions.    Jaz Torres, CNP

## 2019-03-27 ENCOUNTER — RESULT REVIEW (OUTPATIENT)
Age: 49
End: 2019-03-27

## 2019-03-29 ENCOUNTER — MYC REFILL (OUTPATIENT)
Dept: PEDIATRICS | Facility: CLINIC | Age: 49
End: 2019-03-29

## 2019-03-29 DIAGNOSIS — M51.369 DDD (DEGENERATIVE DISC DISEASE), LUMBAR: ICD-10-CM

## 2019-03-29 RX ORDER — HYDROCODONE BITARTRATE AND ACETAMINOPHEN 5; 325 MG/1; MG/1
1 TABLET ORAL EVERY 6 HOURS PRN
Qty: 15 TABLET | Refills: 0 | Status: SHIPPED | OUTPATIENT
Start: 2019-03-29 | End: 2019-04-30

## 2019-03-29 NOTE — TELEPHONE ENCOUNTER
HYDROcodone-acetaminophen (NORCO) 5-325 MG tablet    Last Written Prescription Date:  02/28/19  Last Fill Quantity: 15,   # refills: 0  Last Office Visit: 02/25/19  Future Office visit:       Routing refill request to provider for review/approval because:  Drug not on the [facility], [facility] or [facility] refill protocol or controlled substance

## 2019-03-29 NOTE — TELEPHONE ENCOUNTER
Prescription for HYDROcodone-acetaminophen (NORCO) 5-325 MG tablet  placed at check-in desk C. Patient notified via [phone]. Tiera GUERIN CMA

## 2019-04-30 DIAGNOSIS — M51.369 DDD (DEGENERATIVE DISC DISEASE), LUMBAR: ICD-10-CM

## 2019-04-30 RX ORDER — HYDROCODONE BITARTRATE AND ACETAMINOPHEN 5; 325 MG/1; MG/1
1 TABLET ORAL EVERY 6 HOURS PRN
Qty: 15 TABLET | Refills: 0 | Status: SHIPPED | OUTPATIENT
Start: 2019-04-30 | End: 2019-05-31

## 2019-04-30 NOTE — TELEPHONE ENCOUNTER
"Spoke to patient, she would like script sent to our pharmacy onsite. Script tubed over to pharmacy. Patient noted she was not able to request a refill on hydrocodone through her mychart. She stated the medication was \"grayed\" out. Staff gave mychart line to patient to call and see if it is anything related to mychart. If its not related to mychart, we will try to see what is wrong in her actual chart. Patient verbally understands and agree to plan.    VINCE Lepe     "

## 2019-04-30 NOTE — TELEPHONE ENCOUNTER
Norco      Last Written Prescription Date:  3/29  Last Fill Quantity: 15,   # refills: 0  Last Office Visit: 3/29  Future Office visit:       Routing refill request to provider for review/approval because:  Drug not on the [facility], P or [facility] refill protocol or controlled substance

## 2019-04-30 NOTE — TELEPHONE ENCOUNTER
[facility] Call Center    Phone Message    May a detailed message be left on voicemail: no    Reason for Call: Medication Refill Request    Has the patient contacted the pharmacy for the refill? No - Direct patient to contact their pharmacy.  The pharmacy will send the requests to us on their behalf.    HYDROCODONE refill.     Pharmacy [facility].   Call patient when available for .       Action Taken: Message routed to:  Primary Care p [phone]

## 2019-05-06 ENCOUNTER — MYC MEDICAL ADVICE (OUTPATIENT)
Dept: PEDIATRICS | Facility: CLINIC | Age: 49
End: 2019-05-06

## 2019-05-31 ENCOUNTER — MYC REFILL (OUTPATIENT)
Dept: PEDIATRICS | Facility: CLINIC | Age: 49
End: 2019-05-31

## 2019-05-31 DIAGNOSIS — M51.369 DDD (DEGENERATIVE DISC DISEASE), LUMBAR: ICD-10-CM

## 2019-05-31 RX ORDER — HYDROCODONE BITARTRATE AND ACETAMINOPHEN 5; 325 MG/1; MG/1
1 TABLET ORAL EVERY 6 HOURS PRN
Qty: 15 TABLET | Refills: 0 | Status: SHIPPED | OUTPATIENT
Start: 2019-05-31 | End: 2019-07-07

## 2019-05-31 NOTE — TELEPHONE ENCOUNTER
Provider printed Rx, but failed to sign before leaving clinic for the day. Waiting on onsite provider to sign before sending to pharmacy.  Jaison HALE CMA

## 2019-05-31 NOTE — TELEPHONE ENCOUNTER
Pending Prescriptions:                       Disp   Refills    HYDROcodone-acetaminophen (NORCO) 5-325 M*15 tab*0            Sig: Take 1 tablet by mouth every 6 hours as needed           for pain      Last Written Prescription Date:  4/30/19  Last Fill Quantity: 15,  # refills: 0   Last office visit: 2/25/2019 with prescribing provider:  Jaz Torres CNP   Future Office Visit:      Routing refill request to provider for review/approval because:  Drug not on the G, P or [facility] refill protocol or controlled substance

## 2019-05-31 NOTE — TELEPHONE ENCOUNTER
Rx signed by Dr. Lizette Gaviria and tubed to onsite pharmacy. Sent mychart to inform patient.  Jaison HALE CMA

## 2019-07-08 ENCOUNTER — TELEPHONE (OUTPATIENT)
Dept: PEDIATRICS | Facility: CLINIC | Age: 49
End: 2019-07-08

## 2019-08-02 ENCOUNTER — MYC REFILL (OUTPATIENT)
Dept: PEDIATRICS | Facility: CLINIC | Age: 49
End: 2019-08-02

## 2019-08-02 DIAGNOSIS — M51.369 DDD (DEGENERATIVE DISC DISEASE), LUMBAR: ICD-10-CM

## 2019-08-02 RX ORDER — HYDROCODONE BITARTRATE AND ACETAMINOPHEN 5; 325 MG/1; MG/1
1 TABLET ORAL EVERY 6 HOURS PRN
Qty: 15 TABLET | Refills: 0 | Status: SHIPPED | OUTPATIENT
Start: 2019-08-02 | End: 2019-09-04

## 2019-08-02 NOTE — TELEPHONE ENCOUNTER
HYDROcodone-acetaminophen (NORCO) 5-325 MG tablet      Last Written Prescription Date:  07/08/19  Last Fill Quantity: 15,   # refills: 0  Last Office Visit: 02/25/19  Future Office visit:       Routing refill request to provider for review/approval because:  Drug not on the [facility], [facility] or [facility] refill protocol or controlled substance

## 2019-09-04 ENCOUNTER — MYC REFILL (OUTPATIENT)
Dept: PEDIATRICS | Facility: CLINIC | Age: 49
End: 2019-09-04

## 2019-09-04 DIAGNOSIS — M51.369 DDD (DEGENERATIVE DISC DISEASE), LUMBAR: ICD-10-CM

## 2019-09-04 RX ORDER — HYDROCODONE BITARTRATE AND ACETAMINOPHEN 5; 325 MG/1; MG/1
1 TABLET ORAL EVERY 6 HOURS PRN
Qty: 15 TABLET | Refills: 0 | Status: SHIPPED | OUTPATIENT
Start: 2019-09-04 | End: 2019-10-01

## 2019-09-04 NOTE — TELEPHONE ENCOUNTER
Pending Prescriptions:                       Disp   Refills    HYDROcodone-acetaminophen (NORCO) 5-325 M*15 tab*0            Sig: Take 1 tablet by mouth every 6 hours as needed           for pain      Last Written Prescription Date:  8/2/19  Last Fill Quantity: 15,  # refills: 0   Last office visit: 2/25/2019 with prescribing provider:  Jaz Torres CNP   Future Office Visit:      Routing refill request to provider for review/approval because:  Drug not on the G, P or [facility] refill protocol or controlled substance

## 2019-09-11 ENCOUNTER — MYC MEDICAL ADVICE (OUTPATIENT)
Dept: PEDIATRICS | Facility: CLINIC | Age: 49
End: 2019-09-11

## 2019-09-11 DIAGNOSIS — D64.9 ANEMIA, UNSPECIFIED TYPE: ICD-10-CM

## 2019-09-11 RX ORDER — FERROUS SULFATE 325(65) MG
TABLET ORAL
Qty: 30 TABLET | Refills: 0 | Status: SHIPPED | OUTPATIENT
Start: 2019-09-11 | End: 2020-07-13

## 2019-09-11 NOTE — LETTER
September 11, 2019      Pita Maher  [address]  [place] [address]              Dear Pita,    We recently received a refill request from your pharmacy requesting a refill of : Iron.    A review of your chart indicates that your last office visit was on 2/25.  Since your iron stores were low, she wanted to recheck your labs in March. We have authorized a one time 30 day refill of your medication to allow time for you to schedule a lab only appointment.     Please call the clinic at [phone], or log in to your [employer] account at [email or web address] to schedule your appointment within that time frame so there is no delay in next month's refill.    Thank you for taking an active role in your healthcare.    Sincerely,        Jaz Torres CNP    If you need assistance with your [employer] log in, please call [phone].

## 2019-10-01 ENCOUNTER — MYC REFILL (OUTPATIENT)
Dept: PEDIATRICS | Facility: CLINIC | Age: 49
End: 2019-10-01

## 2019-10-01 DIAGNOSIS — M51.369 DDD (DEGENERATIVE DISC DISEASE), LUMBAR: ICD-10-CM

## 2019-10-01 RX ORDER — HYDROCODONE BITARTRATE AND ACETAMINOPHEN 5; 325 MG/1; MG/1
1 TABLET ORAL EVERY 6 HOURS PRN
Qty: 15 TABLET | Refills: 0 | Status: SHIPPED | OUTPATIENT
Start: 2019-10-01 | End: 2019-10-22

## 2019-10-01 NOTE — TELEPHONE ENCOUNTER
Pending Prescriptions:                       Disp   Refills    HYDROcodone-acetaminophen (NORCO) 5-325 M*15 tab*0            Sig: Take 1 tablet by mouth every 6 hours as needed           for pain      Last Written Prescription Date:  9/4/19  Last Fill Quantity: 15,  # refills: 0   Last office visit: 2/25/2019 with prescribing provider:  Jaz Torres CNP   Future Office Visit:      Routing refill request to provider for review/approval because:  Drug not on the G, P or [facility] refill protocol or controlled substance

## 2019-10-01 NOTE — TELEPHONE ENCOUNTER
Harish Lema,    I placed a script for Hydrocodone-acetaminophen 5-325 mg at the , check in C for you to  per Jaz Torres.    Sincerely,    Christy Mcdermott CMA      My chart message sent to patient advising her to ignore the first mychart message and that I sent the script to [facility] pharmacy.    Christy Mcdermott CMA

## 2019-10-21 ENCOUNTER — MYC REFILL (OUTPATIENT)
Dept: PEDIATRICS | Facility: CLINIC | Age: 49
End: 2019-10-21

## 2019-10-21 DIAGNOSIS — M51.369 DDD (DEGENERATIVE DISC DISEASE), LUMBAR: ICD-10-CM

## 2019-10-21 RX ORDER — HYDROCODONE BITARTRATE AND ACETAMINOPHEN 5; 325 MG/1; MG/1
1 TABLET ORAL EVERY 6 HOURS PRN
Qty: 15 TABLET | Refills: 0 | OUTPATIENT
Start: 2019-10-21

## 2019-10-21 NOTE — TELEPHONE ENCOUNTER
Just got this filled at beginning of October do not recommend increasing vicodin   If she is having increased pain we need to get her into physical therapy or into back specialist   Let me know if we need to make a referral

## 2019-10-21 NOTE — TELEPHONE ENCOUNTER
HYDROcodone-acetaminophen (NORCO) 5-325 MG tablet  Last Written Prescription Date:  10/1/19  Last Fill Quantity: 15,  # refills: 0   Last office visit: 2/25/2019 with prescribing provider:     Future Office Visit:      Please see patient's my chart message.    Routing refill request to provider for review/approval because:  Drug not on the [facility] refill protocol     Minnie Rao RN, BSN, PHN  [facility]

## 2019-10-22 RX ORDER — HYDROCODONE BITARTRATE AND ACETAMINOPHEN 5; 325 MG/1; MG/1
1 TABLET ORAL EVERY 6 HOURS PRN
Qty: 15 TABLET | Refills: 0 | Status: SHIPPED | OUTPATIENT
Start: 2019-10-22 | End: 2019-12-02

## 2019-10-22 NOTE — TELEPHONE ENCOUNTER
Pt requesting rx early because leaving for trip, won't be back until 11/9/19. Doesn't need it increased, just wants to make sure covered while gone. Message routed to provider for further review. Please advise. Katiana resendiz lpn

## 2019-10-22 NOTE — TELEPHONE ENCOUNTER
Please inform patient, refill/prescriptioni approved. Please drop off prescription at pharmacy.   Should be using this very sparingly

## 2019-11-03 ENCOUNTER — HEALTH MAINTENANCE LETTER (OUTPATIENT)
Age: 49
End: 2019-11-03

## 2019-12-02 ENCOUNTER — MYC REFILL (OUTPATIENT)
Dept: PEDIATRICS | Facility: CLINIC | Age: 49
End: 2019-12-02

## 2019-12-02 DIAGNOSIS — M51.369 DDD (DEGENERATIVE DISC DISEASE), LUMBAR: ICD-10-CM

## 2019-12-02 RX ORDER — HYDROCODONE BITARTRATE AND ACETAMINOPHEN 5; 325 MG/1; MG/1
1 TABLET ORAL EVERY 6 HOURS PRN
Qty: 15 TABLET | Refills: 0 | Status: SHIPPED | OUTPATIENT
Start: 2019-12-02 | End: 2020-01-02

## 2019-12-02 NOTE — TELEPHONE ENCOUNTER
Norco  Last Written Prescription Date:  10/22/19   Last Fill Quantity: 15,  # refills: 0   Last office visit: 2/25/2019 with prescribing provider:  Brian   Future Office Visit:

## 2020-01-02 ENCOUNTER — MYC REFILL (OUTPATIENT)
Dept: PEDIATRICS | Facility: CLINIC | Age: 50
End: 2020-01-02

## 2020-01-02 DIAGNOSIS — M51.369 DDD (DEGENERATIVE DISC DISEASE), LUMBAR: ICD-10-CM

## 2020-01-02 RX ORDER — HYDROCODONE BITARTRATE AND ACETAMINOPHEN 5; 325 MG/1; MG/1
1 TABLET ORAL EVERY 6 HOURS PRN
Qty: 15 TABLET | Refills: 0 | Status: SHIPPED | OUTPATIENT
Start: 2020-01-02 | End: 2020-01-31

## 2020-01-02 NOTE — TELEPHONE ENCOUNTER
Requested Prescriptions   Pending Prescriptions Disp Refills     HYDROcodone-acetaminophen (NORCO) 5-325 MG tablet 15 tablet 0     Sig: Take 1 tablet by mouth every 6 hours as needed for pain       There is no refill protocol information for this order        Routing refill request to provider for review/approval because:  Drug not on the [facility] refill protocol     Minnie Rao RN, BSN, PHN  [facility]

## 2020-01-16 DIAGNOSIS — B00.9 RECURRENT HERPES SIMPLEX: ICD-10-CM

## 2020-01-16 DIAGNOSIS — E87.6 HYPOKALEMIA: ICD-10-CM

## 2020-01-16 RX ORDER — POTASSIUM CHLORIDE 1500 MG/1
TABLET, EXTENDED RELEASE ORAL
Qty: 90 TABLET | Refills: 0 | Status: SHIPPED | OUTPATIENT
Start: 2020-01-16 | End: 2020-04-23

## 2020-01-16 RX ORDER — VALACYCLOVIR HYDROCHLORIDE 500 MG/1
TABLET, FILM COATED ORAL
Qty: 90 TABLET | Refills: 0 | Status: SHIPPED | OUTPATIENT
Start: 2020-01-16 | End: 2020-05-04

## 2020-01-16 NOTE — TELEPHONE ENCOUNTER
LOV- 2/25. Due for a physical next month, sent letter and nury x1.  Kyra Whittington RN      Report given to EDMUNDO Raines.

## 2020-01-16 NOTE — LETTER
January 16, 2020      Pita Maher  [address] [place] [address]              Dear Pita,      We recently received a refill request from your pharmacy requesting a refill of : Potassium and Valtrex.    A review of your chart indicates that your last office visit was on 2/25.  You are due for a physical next month with your provider with fasting labs. We have authorized a one time 30 day refill of your medication to allow time for you to schedule.     Please call the clinic at [phone], or log in to your [facility] account at [email or web address] to schedule your appointment within that time frame so there is no delay in next month's refill.    Thank you for taking an active role in your healthcare.    Sincerely,        Jaz Torres CNP    If you need assistance with your [facility] log in, please call [phone].

## 2020-01-31 ENCOUNTER — MYC REFILL (OUTPATIENT)
Dept: PEDIATRICS | Facility: CLINIC | Age: 50
End: 2020-01-31

## 2020-01-31 DIAGNOSIS — M51.369 DDD (DEGENERATIVE DISC DISEASE), LUMBAR: ICD-10-CM

## 2020-01-31 RX ORDER — HYDROCODONE BITARTRATE AND ACETAMINOPHEN 5; 325 MG/1; MG/1
1 TABLET ORAL EVERY 6 HOURS PRN
Qty: 15 TABLET | Refills: 0 | Status: SHIPPED | OUTPATIENT
Start: 2020-02-01 | End: 2020-03-02

## 2020-01-31 NOTE — TELEPHONE ENCOUNTER
Norco      Last Written Prescription Date:  1/2  Last Fill Quantity: 15,   # refills: 0  Last Office Visit: 2/25  Future Office visit:       Routing refill request to provider for review/approval because:  Drug not on the [facility], [facility] or [facility] refill protocol or controlled substance

## 2020-02-04 ENCOUNTER — MYC REFILL (OUTPATIENT)
Dept: PEDIATRICS | Facility: CLINIC | Age: 50
End: 2020-02-04

## 2020-02-04 DIAGNOSIS — M51.369 DDD (DEGENERATIVE DISC DISEASE), LUMBAR: ICD-10-CM

## 2020-02-04 RX ORDER — HYDROCODONE BITARTRATE AND ACETAMINOPHEN 5; 325 MG/1; MG/1
1 TABLET ORAL EVERY 6 HOURS PRN
Qty: 15 TABLET | Refills: 0 | Status: CANCELLED | OUTPATIENT
Start: 2020-02-04

## 2020-02-12 DIAGNOSIS — I10 ESSENTIAL HYPERTENSION WITH GOAL BLOOD PRESSURE LESS THAN 140/90: ICD-10-CM

## 2020-02-12 RX ORDER — HYDROCHLOROTHIAZIDE 25 MG/1
TABLET ORAL
Qty: 90 TABLET | Refills: 0 | Status: SHIPPED | OUTPATIENT
Start: 2020-02-12 | End: 2020-05-04

## 2020-02-12 NOTE — LETTER
February 12, 2020      Pita Maher  [address] [place] [address]      Dear Pita,    We recently received a call from your pharmacy requesting a refill of your medication.    A review of your chart indicates that an appointment is required with your provider.  Please call the clinic to schedule your appointment.    We have authorized one refill of your medication to allow time for you to schedule.   If you have a history of diabetes or high cholesterol, please come in fasting for the appointment. Fasting entails nothing to eat or drink 8 hours prior to your appointment; with the exception on water. You may take your medication the day of the appointment.    Thank you,      Jaz Torres CNP

## 2020-02-19 ENCOUNTER — MYC MEDICAL ADVICE (OUTPATIENT)
Dept: PEDIATRICS | Facility: CLINIC | Age: 50
End: 2020-02-19

## 2020-03-02 ENCOUNTER — MYC REFILL (OUTPATIENT)
Dept: PEDIATRICS | Facility: CLINIC | Age: 50
End: 2020-03-02

## 2020-03-02 DIAGNOSIS — M51.369 DDD (DEGENERATIVE DISC DISEASE), LUMBAR: ICD-10-CM

## 2020-03-02 RX ORDER — HYDROCODONE BITARTRATE AND ACETAMINOPHEN 5; 325 MG/1; MG/1
1 TABLET ORAL EVERY 6 HOURS PRN
Qty: 15 TABLET | Refills: 0 | Status: SHIPPED | OUTPATIENT
Start: 2020-03-02 | End: 2020-03-30

## 2020-03-02 NOTE — TELEPHONE ENCOUNTER
Norco      Last Written Prescription Date:  2/1  Last Fill Quantity: 15,   # refills: 0  Last Office Visit: 2/25/19  Future Office visit:    Next 5 appointments (look out 90 days)    Apr 06, 2020  6:30 PM CDT  [facility] with SHAHRAM Ambriz CNP  [facility] [address] [place] [id number] [phone]         Routing refill request to provider for review/approval because:  Drug not on the [facility], [facility] or [facility] refill protocol or controlled substance

## 2020-03-30 ENCOUNTER — MYC REFILL (OUTPATIENT)
Dept: PEDIATRICS | Facility: CLINIC | Age: 50
End: 2020-03-30

## 2020-03-30 DIAGNOSIS — M51.369 DDD (DEGENERATIVE DISC DISEASE), LUMBAR: ICD-10-CM

## 2020-03-31 RX ORDER — HYDROCODONE BITARTRATE AND ACETAMINOPHEN 5; 325 MG/1; MG/1
1 TABLET ORAL EVERY 6 HOURS PRN
Qty: 15 TABLET | Refills: 0 | Status: SHIPPED | OUTPATIENT
Start: 2020-04-02 | End: 2020-04-02

## 2020-03-31 NOTE — TELEPHONE ENCOUNTER
HYDROcodone-acetaminophen (NORCO) 5-325 MG tablet   Last Written Prescription Date:  3/2/20  Last Fill Quantity: 15,  # refills: 0   Last office visit: 2/25/2019 with prescribing provider:  Jaz Torres   Future Office Visit:      Routing refill request to provider for review/approval because:  Drug not on the [facility], [facility] or [facility] refill protocol or controlled substance

## 2020-04-02 RX ORDER — HYDROCODONE BITARTRATE AND ACETAMINOPHEN 5; 325 MG/1; MG/1
1 TABLET ORAL EVERY 6 HOURS PRN
Qty: 15 TABLET | Refills: 0 | Status: SHIPPED | OUTPATIENT
Start: 2020-04-02 | End: 2020-05-04

## 2020-04-02 NOTE — TELEPHONE ENCOUNTER
Patient called and states her prescription for HYDROcodone-acetaminophen (NORCO) 5-325 MG tablet was sent to wrong pharmacy. Patient states she is out of [place] and would like it sent to [facility] in [place], [place] at fax number : [phone]. Phone number for pharmacy is [phone].

## 2020-04-23 ENCOUNTER — MYC REFILL (OUTPATIENT)
Dept: PEDIATRICS | Facility: CLINIC | Age: 50
End: 2020-04-23

## 2020-04-23 DIAGNOSIS — E87.6 HYPOKALEMIA: ICD-10-CM

## 2020-04-23 RX ORDER — POTASSIUM CHLORIDE 1500 MG/1
20 TABLET, EXTENDED RELEASE ORAL DAILY
Qty: 90 TABLET | Refills: 0 | Status: SHIPPED | OUTPATIENT
Start: 2020-04-23 | End: 2020-07-13

## 2020-05-04 ENCOUNTER — MYC REFILL (OUTPATIENT)
Dept: PEDIATRICS | Facility: CLINIC | Age: 50
End: 2020-05-04

## 2020-05-04 DIAGNOSIS — I10 ESSENTIAL HYPERTENSION WITH GOAL BLOOD PRESSURE LESS THAN 140/90: ICD-10-CM

## 2020-05-04 DIAGNOSIS — B00.9 RECURRENT HERPES SIMPLEX: ICD-10-CM

## 2020-05-04 DIAGNOSIS — M51.369 DDD (DEGENERATIVE DISC DISEASE), LUMBAR: ICD-10-CM

## 2020-05-04 RX ORDER — HYDROCODONE BITARTRATE AND ACETAMINOPHEN 5; 325 MG/1; MG/1
1 TABLET ORAL EVERY 6 HOURS PRN
Qty: 15 TABLET | Refills: 0 | Status: SHIPPED | OUTPATIENT
Start: 2020-05-04 | End: 2020-06-02

## 2020-05-04 RX ORDER — VALACYCLOVIR HYDROCHLORIDE 500 MG/1
500 TABLET, FILM COATED ORAL DAILY
Qty: 90 TABLET | Refills: 1 | Status: SHIPPED | OUTPATIENT
Start: 2020-05-04 | End: 2020-07-13

## 2020-05-04 RX ORDER — HYDROCHLOROTHIAZIDE 25 MG/1
25 TABLET ORAL DAILY
Qty: 90 TABLET | Refills: 0 | Status: SHIPPED | OUTPATIENT
Start: 2020-05-04 | End: 2020-07-13

## 2020-05-04 NOTE — TELEPHONE ENCOUNTER
Norco  Last Written Prescription Date:  4/2/2020  Last Fill Quantity: 15,  # refills: 0   Last office visit: 2/25/2019 with prescribing provider:  keely mendez cnp   Future Office Visit:   Next 5 appointments (look out 90 days)    Jul 13, 2020  1:30 PM CDT  PHYSICAL with SHAHRAM Ambriz CNP  [facility] [address] [place] [id number] [phone]         Routing refill request to provider for review/approval because:  Drug not on the [facility] refill protocol       Hydrochlorothiazide  Last Written Prescription Date:  2/12/2020  Last Fill Quantity: 90,  # refills: 0   Last office visit: 2/25/2019 with prescribing provider:  eKely Mendez cnp   Future Office Visit:   Next 5 appointments (look out 90 days)    Jul 13, 2020  1:30 PM CDT  PHYSICAL with SHAHRAM Ambriz CNP  [facility]) [address] [place] [id number] [phone]

## 2020-05-04 NOTE — TELEPHONE ENCOUNTER
Valacyclovir  Last Written Prescription Date: 1/16/2020  Last Fill Quantity: 90 ,  # refills: 0   Last office visit: 2/25/2019 with prescribing provider:  Joselin Torres CNP   Future Office Visit:   Next 5 appointments (look out 90 days)    Jul 13, 2020  1:30 PM CDT  PHYSICAL with SHAHRAM Ambriz CNP  [facility] ([facility]) [address] [place] [id number] [phone]         Routing refill request to provider for review/approval because:  Patient needs to be seen because it has been more than 1 year since last office visit.

## 2020-05-21 NOTE — TELEPHONE ENCOUNTER
Please inform patient, refill/prescription approved and when prescription is ready to be picked up at .   Please ask provider in clinic to sign off   Thanks    No

## 2020-05-26 ENCOUNTER — DOCUMENTATION ONLY (OUTPATIENT)
Dept: LAB | Facility: CLINIC | Age: 50
End: 2020-05-26

## 2020-05-26 DIAGNOSIS — I10 HTN, GOAL BELOW 140/90: Primary | ICD-10-CM

## 2020-05-26 DIAGNOSIS — Z13.6 CARDIOVASCULAR SCREENING; LDL GOAL LESS THAN 130: ICD-10-CM

## 2020-05-27 DIAGNOSIS — Z13.6 CARDIOVASCULAR SCREENING; LDL GOAL LESS THAN 130: ICD-10-CM

## 2020-05-27 DIAGNOSIS — I10 HTN, GOAL BELOW 140/90: ICD-10-CM

## 2020-05-27 LAB
ANION GAP SERPL CALCULATED.3IONS-SCNC: 4 MMOL/L (ref 3–14)
BUN SERPL-MCNC: 12 MG/DL (ref 7–30)
CALCIUM SERPL-MCNC: 9 MG/DL (ref 8.5–10.1)
CHLORIDE SERPL-SCNC: 105 MMOL/L (ref 94–109)
CHOLEST SERPL-MCNC: 266 MG/DL
CO2 SERPL-SCNC: 29 MMOL/L (ref 20–32)
CREAT SERPL-MCNC: 0.82 MG/DL (ref 0.52–1.04)
GFR SERPL CREATININE-BSD FRML MDRD: 83 ML/MIN/{1.73_M2}
GLUCOSE SERPL-MCNC: 94 MG/DL (ref 70–99)
HDLC SERPL-MCNC: 105 MG/DL
LDLC SERPL CALC-MCNC: 141 MG/DL
NONHDLC SERPL-MCNC: 161 MG/DL
POTASSIUM SERPL-SCNC: 3.3 MMOL/L (ref 3.4–5.3)
SODIUM SERPL-SCNC: 138 MMOL/L (ref 133–144)
TRIGL SERPL-MCNC: 100 MG/DL

## 2020-05-27 PROCEDURE — 36415 COLL VENOUS BLD VENIPUNCTURE: CPT | Performed by: NURSE PRACTITIONER

## 2020-05-27 PROCEDURE — 80048 BASIC METABOLIC PNL TOTAL CA: CPT | Performed by: NURSE PRACTITIONER

## 2020-05-27 PROCEDURE — 80061 LIPID PANEL: CPT | Performed by: NURSE PRACTITIONER

## 2020-05-27 NOTE — RESULT ENCOUNTER NOTE
Gilbert Maher,    Attached are your test results.  -LDL(bad) cholesterol level is elevated which can increase your heart disease risk.  A diet high in fat and simple carbohydrates, genetics and being overweight can contribute to this. ADVISE: exercising 150 minutes of aerobic exercise per week (30 minutes for 5 days per week or 50 minutes for 3 days per week are options) and eating a low saturated fat/low carbohydrate diet are helpful to improve this. In 3 months, you should recheck your fasting cholesterol panel if still elevated may need to consider cholesterol medication   -Kidney function (GFR) is normal.  -Sodium is normal.  -Potassium is decreased.  ADVISE: rechecking this in 1 month.  -Calcium is normal.  -Glucose (diabetic screening test) is normal.   Please contact us if you have any questions.    Jaz Torres, CNP

## 2020-06-02 ENCOUNTER — MYC REFILL (OUTPATIENT)
Dept: PEDIATRICS | Facility: CLINIC | Age: 50
End: 2020-06-02

## 2020-06-02 DIAGNOSIS — M51.369 DDD (DEGENERATIVE DISC DISEASE), LUMBAR: ICD-10-CM

## 2020-06-03 RX ORDER — HYDROCODONE BITARTRATE AND ACETAMINOPHEN 5; 325 MG/1; MG/1
1 TABLET ORAL EVERY 6 HOURS PRN
Qty: 15 TABLET | Refills: 0 | Status: SHIPPED | OUTPATIENT
Start: 2020-06-03 | End: 2020-07-01

## 2020-06-03 NOTE — TELEPHONE ENCOUNTER
Hydrocodone  Last Written Prescription Date:  5/4/2020  Last Fill Quantity: 15,  # refills: 0   Last office visit: 2/25/2019 with prescribing provider:  Joselin Torres   Future Office Visit:   Next 5 appointments (look out 90 days)    Jul 13, 2020  1:30 PM CDT  PHYSICAL with SHAHRAM Ambriz CNP  [facility] ([facility]) [address] [place] [id number] [phone]         .Routing refill request to provider for review/approval because:  Drug not on the [facility] refill protocol

## 2020-07-13 ENCOUNTER — OFFICE VISIT (OUTPATIENT)
Dept: PEDIATRICS | Facility: CLINIC | Age: 50
End: 2020-07-13
Payer: COMMERCIAL

## 2020-07-13 VITALS
SYSTOLIC BLOOD PRESSURE: 112 MMHG | OXYGEN SATURATION: 100 % | HEIGHT: 65 IN | TEMPERATURE: 97.8 F | HEART RATE: 67 BPM | BODY MASS INDEX: 33.44 KG/M2 | WEIGHT: 200.7 LBS | DIASTOLIC BLOOD PRESSURE: 70 MMHG

## 2020-07-13 DIAGNOSIS — B00.9 RECURRENT HERPES SIMPLEX: ICD-10-CM

## 2020-07-13 DIAGNOSIS — I10 HTN, GOAL BELOW 140/90: ICD-10-CM

## 2020-07-13 DIAGNOSIS — Z13.1 SCREENING FOR DIABETES MELLITUS (DM): ICD-10-CM

## 2020-07-13 DIAGNOSIS — Z12.11 SCREEN FOR COLON CANCER: ICD-10-CM

## 2020-07-13 DIAGNOSIS — Z00.00 ROUTINE GENERAL MEDICAL EXAMINATION AT A HEALTH CARE FACILITY: Primary | ICD-10-CM

## 2020-07-13 DIAGNOSIS — Z12.31 ENCOUNTER FOR SCREENING MAMMOGRAM FOR BREAST CANCER: ICD-10-CM

## 2020-07-13 DIAGNOSIS — D64.9 ANEMIA, UNSPECIFIED TYPE: ICD-10-CM

## 2020-07-13 DIAGNOSIS — R53.83 FATIGUE, UNSPECIFIED TYPE: ICD-10-CM

## 2020-07-13 DIAGNOSIS — G47.00 INSOMNIA, UNSPECIFIED TYPE: ICD-10-CM

## 2020-07-13 DIAGNOSIS — Z13.6 CARDIOVASCULAR SCREENING; LDL GOAL LESS THAN 130: ICD-10-CM

## 2020-07-13 DIAGNOSIS — E87.6 HYPOKALEMIA: ICD-10-CM

## 2020-07-13 PROCEDURE — 99396 PREV VISIT EST AGE 40-64: CPT | Performed by: NURSE PRACTITIONER

## 2020-07-13 RX ORDER — FERROUS SULFATE 325(65) MG
TABLET ORAL
Qty: 90 TABLET | Refills: 3 | Status: SHIPPED | OUTPATIENT
Start: 2020-07-13

## 2020-07-13 RX ORDER — POTASSIUM CHLORIDE 1500 MG/1
20 TABLET, EXTENDED RELEASE ORAL DAILY
Qty: 90 TABLET | Refills: 3 | Status: SHIPPED | OUTPATIENT
Start: 2020-07-13 | End: 2021-07-26

## 2020-07-13 RX ORDER — VALACYCLOVIR HYDROCHLORIDE 500 MG/1
500 TABLET, FILM COATED ORAL DAILY
Qty: 90 TABLET | Refills: 1 | Status: SHIPPED | OUTPATIENT
Start: 2020-07-13 | End: 2021-02-17

## 2020-07-13 RX ORDER — DEXTROAMPHETAMINE SACCHARATE, AMPHETAMINE ASPARTATE MONOHYDRATE, DEXTROAMPHETAMINE SULFATE AND AMPHETAMINE SULFATE 6.25; 6.25; 6.25; 6.25 MG/1; MG/1; MG/1; MG/1
30 CAPSULE, EXTENDED RELEASE ORAL
COMMUNITY
Start: 2020-07-01

## 2020-07-13 RX ORDER — HYDROCHLOROTHIAZIDE 25 MG/1
25 TABLET ORAL DAILY
Qty: 90 TABLET | Refills: 3 | Status: SHIPPED | OUTPATIENT
Start: 2020-07-13 | End: 2020-09-03

## 2020-07-13 ASSESSMENT — MIFFLIN-ST. JEOR: SCORE: 1523.31

## 2020-07-13 NOTE — PATIENT INSTRUCTIONS
PLAN:   1.   Symptomatic therapy suggested: Continue current medication regimen unchanged.  Increase calcium to 1000mg and 800iu Vit D    2.  Orders Placed This Encounter   Medications     amphetamine-dextroamphetamine (ADDERALL XR) 25 MG 24 hr capsule     Sig: TAKE ONE CAPSULE BY MOUTH EVERY DAY     potassium chloride ER (KLOR-CON) 20 MEQ CR tablet     Sig: Take 1 tablet (20 mEq) by mouth daily     Dispense:  90 tablet     Refill:  3     valACYclovir (VALTREX) 500 MG tablet     Sig: Take 1 tablet (500 mg) by mouth daily     Dispense:  90 tablet     Refill:  1     ferrous sulfate (FEROSUL) 325 (65 Fe) MG tablet     Sig: Take 1 tablet (325 mg) by mouth daily (with breakfast)     Dispense:  90 tablet     Refill:  3     hydrochlorothiazide (HYDRODIURIL) 25 MG tablet     Sig: Take 1 tablet (25 mg) by mouth daily     Dispense:  90 tablet     Refill:  3     Orders Placed This Encounter   Procedures     *MA Screening Digital Bilateral     JUST IN CASE     Lipid panel reflex to direct LDL Fasting     Comprehensive metabolic panel     TSH with free T4 reflex     Albumin Random Urine Quantitative with Creat Ratio     CBC with platelets     Iron and iron binding capacity     Ferritin     GASTROENTEROLOGY ADULT REF PROCEDURE ONLY     SLEEP EVALUATION & MANAGEMENT REFERRAL - ADULT -[facility]  [phone] (Age 15 and up)       3. Patient needs to follow up in if no improvement,or sooner if worsening of symptoms or other symptoms develop.  FURTHER TESTING:       - mammogram  I will place order. Please call [phone] to schedule.  CONSULTATION/REFERRAL to colonoscopy  Please call [phone] to make appointment  if you do not hear from referrals in the next few days.   FUTURE LABS:       - Schedule a fasting blood draw   Will follow up and/or notify patient of  results via My Chart to determine further need for followup  Referral to Sleep Center   Work on weight loss  Regular exercise  Follow up  office visit in one year for annual health maintenance exam, sooner PRN.    Preventive Health Recommendations  Female Ages 50 - 64    Yearly exam: See your health care provider every year in order to  o Review health changes.   o Discuss preventive care.    o Review your medicines if your doctor has prescribed any.      Get a Pap test every three years (unless you have an abnormal result and your provider advises testing more often).    If you get Pap tests with HPV test, you only need to test every 5 years, unless you have an abnormal result.     You do not need a Pap test if your uterus was removed (hysterectomy) and you have not had cancer.    You should be tested each year for STDs (sexually transmitted diseases) if you're at risk.     Have a mammogram every 1 to 2 years.    Have a colonoscopy at age 50, or have a yearly FIT test (stool test). These exams screen for colon cancer.      Have a cholesterol test every 5 years, or more often if advised.    Have a diabetes test (fasting glucose) every three years. If you are at risk for diabetes, you should have this test more often.     If you are at risk for osteoporosis (brittle bone disease), think about having a bone density scan (DEXA).    Shots: Get a flu shot each year. Get a tetanus shot every 10 years.    Nutrition:     Eat at least 5 servings of fruits and vegetables each day.    Eat whole-grain bread, whole-wheat pasta and brown rice instead of white grains and rice.    Get adequate Calcium and Vitamin D.     Lifestyle    Exercise at least 150 minutes a week (30 minutes a day, 5 days a week). This will help you control your weight and prevent disease.    Limit alcohol to one drink per day.    No smoking.     Wear sunscreen to prevent skin cancer.     See your dentist every six months for an exam and cleaning.    See your eye doctor every 1 to 2 years.
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
## 2020-07-13 NOTE — NURSING NOTE
"Chief Complaint   Patient presents with     Physical     AFE       Initial /70 (BP Location: Right arm, Patient Position: Sitting, Cuff Size: Adult Large)   Pulse 67   Temp 97.8  F (36.6  C) (Temporal)   Ht 1.638 m (5' 4.5\")   Wt 91 kg (200 lb 11.2 oz)   LMP 07/09/2020 (Approximate)   SpO2 100%   Breastfeeding No   BMI 33.92 kg/m   Estimated body mass index is 33.92 kg/m  as calculated from the following:    Height as of this encounter: 1.638 m (5' 4.5\").    Weight as of this encounter: 91 kg (200 lb 11.2 oz).  Medication Reconciliation: complete      VINCE Lepe      "

## 2020-07-30 DIAGNOSIS — I10 HTN, GOAL BELOW 140/90: ICD-10-CM

## 2020-08-04 RX ORDER — HYDROCHLOROTHIAZIDE 25 MG/1
TABLET ORAL
Qty: 90 TABLET | Refills: 0 | OUTPATIENT
Start: 2020-08-04

## 2020-08-05 ENCOUNTER — MYC REFILL (OUTPATIENT)
Dept: PEDIATRICS | Facility: CLINIC | Age: 50
End: 2020-08-05

## 2020-08-05 DIAGNOSIS — M51.369 DDD (DEGENERATIVE DISC DISEASE), LUMBAR: ICD-10-CM

## 2020-08-07 RX ORDER — HYDROCODONE BITARTRATE AND ACETAMINOPHEN 5; 325 MG/1; MG/1
1 TABLET ORAL EVERY 6 HOURS PRN
Qty: 15 TABLET | Refills: 0 | Status: SHIPPED | OUTPATIENT
Start: 2020-08-07 | End: 2020-09-10

## 2020-08-07 NOTE — TELEPHONE ENCOUNTER
Hydrocodone  Last Written Prescription Date:  7/1/2020  Last Fill Quantity: 15,  # refills: 0   Last office visit: 7/13/2020 with prescribing provider:  Joselin Torres   Future Office Visit:    Routing refill request to provider for review/approval because:  Drug not on the [facility] refill protocol

## 2020-08-29 ENCOUNTER — MYC REFILL (OUTPATIENT)
Dept: PEDIATRICS | Facility: CLINIC | Age: 50
End: 2020-08-29

## 2020-08-29 DIAGNOSIS — M51.369 DDD (DEGENERATIVE DISC DISEASE), LUMBAR: ICD-10-CM

## 2020-08-30 DIAGNOSIS — I10 HTN, GOAL BELOW 140/90: ICD-10-CM

## 2020-08-31 RX ORDER — HYDROCODONE BITARTRATE AND ACETAMINOPHEN 5; 325 MG/1; MG/1
1 TABLET ORAL EVERY 6 HOURS PRN
Qty: 15 TABLET | Refills: 0 | Status: CANCELLED | OUTPATIENT
Start: 2020-09-07

## 2020-08-31 NOTE — TELEPHONE ENCOUNTER
Hydrocodone-acetaminophin  Last Written Prescription Date:  8/7/2020  Last Fill Quantity: 15,  # refills: 0   Last office visit: 7/13/2020 with prescribing provider:  Joselin Torres   Future Office Visit:    Routing refill request to provider for review/approval because:  Drug not on the [facility] refill protocol

## 2020-08-31 NOTE — TELEPHONE ENCOUNTER
Spoke to patient, she appreciated the offer for referrals, she is seeing a back specialist in [place], she cannot remember the name of the clinic, she also has a more recent MRI so she is declining this as well.    Ale Martinez RN, [facility]

## 2020-08-31 NOTE — TELEPHONE ENCOUNTER
Her last MRI was about 5 years ago it looks like   I can order a new one and make referral to back specialist here?

## 2020-08-31 NOTE — TELEPHONE ENCOUNTER
Spoke to patient, she didn't realize that it had been only 3 weeks.  She is ok with waiting on the refill for 1 more week.    Gave her the message from Joselin Torres NP.  She states she has tried everything, PT, injections, Yoga, acupuncture.    She knows the next option is to do a spinal fusion and feels she is too young to do this.  She will try to see a spine specialist.     Routing to Joselin Torres NP to review.    Ale Martinez RN, [facility]

## 2020-08-31 NOTE — TELEPHONE ENCOUNTER
Just had a refill 3 weeks ago we need to look at doing a follow up appointment as we may need to start her back in physical therapy   May need a MRI and referral to back specialist so we can look at reducing the vicodin use  We can do a virtual visit

## 2020-09-03 RX ORDER — HYDROCHLOROTHIAZIDE 25 MG/1
25 TABLET ORAL DAILY
Qty: 90 TABLET | Refills: 2 | Status: SHIPPED | OUTPATIENT
Start: 2020-09-03 | End: 2021-08-16

## 2020-09-10 ENCOUNTER — MYC REFILL (OUTPATIENT)
Dept: PEDIATRICS | Facility: CLINIC | Age: 50
End: 2020-09-10

## 2020-09-10 DIAGNOSIS — M51.369 DDD (DEGENERATIVE DISC DISEASE), LUMBAR: ICD-10-CM

## 2020-09-11 NOTE — TELEPHONE ENCOUNTER
Routing to Joselin Torres NP to please review refill request.    Ale Martinez RN, [facility]

## 2020-09-13 RX ORDER — HYDROCODONE BITARTRATE AND ACETAMINOPHEN 5; 325 MG/1; MG/1
1 TABLET ORAL EVERY 6 HOURS PRN
Qty: 15 TABLET | Refills: 0 | Status: SHIPPED | OUTPATIENT
Start: 2020-09-13 | End: 2020-10-15

## 2020-09-17 DIAGNOSIS — Z13.1 SCREENING FOR DIABETES MELLITUS (DM): ICD-10-CM

## 2020-09-17 DIAGNOSIS — Z13.6 CARDIOVASCULAR SCREENING; LDL GOAL LESS THAN 130: ICD-10-CM

## 2020-09-17 DIAGNOSIS — Z00.00 ROUTINE GENERAL MEDICAL EXAMINATION AT A HEALTH CARE FACILITY: ICD-10-CM

## 2020-09-17 DIAGNOSIS — D64.9 ANEMIA, UNSPECIFIED TYPE: ICD-10-CM

## 2020-09-17 DIAGNOSIS — I10 HTN, GOAL BELOW 140/90: ICD-10-CM

## 2020-09-17 LAB
ALBUMIN SERPL-MCNC: 4.1 G/DL (ref 3.4–5)
ALP SERPL-CCNC: 73 U/L (ref 40–150)
ALT SERPL W P-5'-P-CCNC: 23 U/L (ref 0–50)
ANION GAP SERPL CALCULATED.3IONS-SCNC: 5 MMOL/L (ref 3–14)
AST SERPL W P-5'-P-CCNC: 16 U/L (ref 0–45)
BILIRUB SERPL-MCNC: 0.6 MG/DL (ref 0.2–1.3)
BUN SERPL-MCNC: 16 MG/DL (ref 7–30)
CALCIUM SERPL-MCNC: 9.2 MG/DL (ref 8.5–10.1)
CHLORIDE SERPL-SCNC: 101 MMOL/L (ref 94–109)
CHOLEST SERPL-MCNC: 246 MG/DL
CO2 SERPL-SCNC: 32 MMOL/L (ref 20–32)
CREAT SERPL-MCNC: 0.82 MG/DL (ref 0.52–1.04)
CREAT UR-MCNC: 50 MG/DL
ERYTHROCYTE [DISTWIDTH] IN BLOOD BY AUTOMATED COUNT: 12.2 % (ref 10–15)
FERRITIN SERPL-MCNC: 27 NG/ML (ref 8–252)
GFR SERPL CREATININE-BSD FRML MDRD: 83 ML/MIN/{1.73_M2}
GLUCOSE SERPL-MCNC: 101 MG/DL (ref 70–99)
HCT VFR BLD AUTO: 46.9 % (ref 35–47)
HDLC SERPL-MCNC: 113 MG/DL
HGB BLD-MCNC: 15.3 G/DL (ref 11.7–15.7)
IRON SATN MFR SERPL: 18 % (ref 15–46)
IRON SERPL-MCNC: 65 UG/DL (ref 35–180)
LDLC SERPL CALC-MCNC: 121 MG/DL
MCH RBC QN AUTO: 28.6 PG (ref 26.5–33)
MCHC RBC AUTO-ENTMCNC: 32.6 G/DL (ref 31.5–36.5)
MCV RBC AUTO: 88 FL (ref 78–100)
MICROALBUMIN UR-MCNC: <5 MG/L
MICROALBUMIN/CREAT UR: NORMAL MG/G CR (ref 0–25)
NONHDLC SERPL-MCNC: 133 MG/DL
PLATELET # BLD AUTO: 266 10E9/L (ref 150–450)
POTASSIUM SERPL-SCNC: 3.7 MMOL/L (ref 3.4–5.3)
PROT SERPL-MCNC: 7.6 G/DL (ref 6.8–8.8)
RBC # BLD AUTO: 5.35 10E12/L (ref 3.8–5.2)
SODIUM SERPL-SCNC: 138 MMOL/L (ref 133–144)
TIBC SERPL-MCNC: 361 UG/DL (ref 240–430)
TRIGL SERPL-MCNC: 58 MG/DL
TSH SERPL DL<=0.005 MIU/L-ACNC: 1.21 MU/L (ref 0.4–4)
WBC # BLD AUTO: 6.1 10E9/L (ref 4–11)

## 2020-09-17 PROCEDURE — 83550 IRON BINDING TEST: CPT | Performed by: NURSE PRACTITIONER

## 2020-09-17 PROCEDURE — 36415 COLL VENOUS BLD VENIPUNCTURE: CPT | Performed by: NURSE PRACTITIONER

## 2020-09-17 PROCEDURE — 83540 ASSAY OF IRON: CPT | Performed by: NURSE PRACTITIONER

## 2020-09-17 PROCEDURE — 80061 LIPID PANEL: CPT | Performed by: NURSE PRACTITIONER

## 2020-09-17 PROCEDURE — 82728 ASSAY OF FERRITIN: CPT | Performed by: NURSE PRACTITIONER

## 2020-09-17 PROCEDURE — 82043 UR ALBUMIN QUANTITATIVE: CPT | Performed by: NURSE PRACTITIONER

## 2020-09-17 PROCEDURE — 80053 COMPREHEN METABOLIC PANEL: CPT | Performed by: NURSE PRACTITIONER

## 2020-09-17 PROCEDURE — 84443 ASSAY THYROID STIM HORMONE: CPT | Performed by: NURSE PRACTITIONER

## 2020-09-17 PROCEDURE — 85027 COMPLETE CBC AUTOMATED: CPT | Performed by: NURSE PRACTITIONER

## 2020-09-18 NOTE — RESULT ENCOUNTER NOTE
Gilbert Maher,    Attached are your test results.  -Normal red blood cell (hgb) levels, normal white blood cell count and normal platelet levels.  -LDL(bad) cholesterol level is elevated which can increase your heart disease risk.  A diet high in fat and simple carbohydrates, genetics and being overweight can contribute to this. ADVISE: exercising 150 minutes of aerobic exercise per week (30 minutes for 5 days per week or 50 minutes for 3 days per week are options) and eating a low saturated fat/low carbohydrate diet are helpful to improve this. In 12 months, you should recheck your fasting cholesterol panel by scheduling a lab-only appointment.  -Liver and gallbladder tests are normal (ALT,AST, Alk phos, bilirubin), kidney function is normal (Cr, GFR), sodium is normal, potassium is normal, calcium is normal, glucose is normal.  -TSH (thyroid stimulating hormone) level is normal which indicates normal thyroid function.  -Ferritin (iron) level is normal.   Please contact us if you have any questions.    Jaz Torres, CNP

## 2020-10-15 ENCOUNTER — MYC REFILL (OUTPATIENT)
Dept: PEDIATRICS | Facility: CLINIC | Age: 50
End: 2020-10-15

## 2020-10-15 DIAGNOSIS — M51.369 DDD (DEGENERATIVE DISC DISEASE), LUMBAR: ICD-10-CM

## 2020-10-17 ENCOUNTER — MYC REFILL (OUTPATIENT)
Dept: PEDIATRICS | Facility: CLINIC | Age: 50
End: 2020-10-17

## 2020-10-17 DIAGNOSIS — M51.369 DDD (DEGENERATIVE DISC DISEASE), LUMBAR: ICD-10-CM

## 2020-10-17 NOTE — TELEPHONE ENCOUNTER
Please inform patient, refill/prescriptioni approved. Please drop off prescription at pharmacy.      100

## 2020-10-20 RX ORDER — HYDROCODONE BITARTRATE AND ACETAMINOPHEN 5; 325 MG/1; MG/1
1 TABLET ORAL EVERY 6 HOURS PRN
Qty: 15 TABLET | Refills: 0 | Status: SHIPPED | OUTPATIENT
Start: 2020-10-20 | End: 2020-11-17

## 2020-10-20 NOTE — TELEPHONE ENCOUNTER
Routing [email or web address] message to Joselin Torres NP  to please review when able.      Ale Martinez RN, [facility]        Pita Maher, Jaz Rasmussen, SHAHRAM CNP 40 minutes ago (3:55 PM)        Hi. I requested a renewal for the Vicadin last week (originally on the 15th, I believe). I sent a 2nd request yesterday when I still didn't have a renewal. Have you received my requests?      Thanks   Pita

## 2020-10-21 RX ORDER — HYDROCODONE BITARTRATE AND ACETAMINOPHEN 5; 325 MG/1; MG/1
1 TABLET ORAL EVERY 6 HOURS PRN
Qty: 15 TABLET | Refills: 0 | OUTPATIENT
Start: 2020-10-21

## 2020-10-21 NOTE — TELEPHONE ENCOUNTER
Duplicate request.    Rx sent yesterday.  Notified patient.    Ale Martinez RN, [facility]

## 2020-11-16 ENCOUNTER — HEALTH MAINTENANCE LETTER (OUTPATIENT)
Age: 50
End: 2020-11-16

## 2020-11-17 ENCOUNTER — MYC REFILL (OUTPATIENT)
Dept: PEDIATRICS | Facility: CLINIC | Age: 50
End: 2020-11-17

## 2020-11-17 DIAGNOSIS — M51.369 DDD (DEGENERATIVE DISC DISEASE), LUMBAR: ICD-10-CM

## 2020-11-19 RX ORDER — HYDROCODONE BITARTRATE AND ACETAMINOPHEN 5; 325 MG/1; MG/1
1 TABLET ORAL EVERY 6 HOURS PRN
Qty: 15 TABLET | Refills: 0 | Status: SHIPPED | OUTPATIENT
Start: 2020-11-19 | End: 2020-12-12

## 2020-12-12 ENCOUNTER — MYC REFILL (OUTPATIENT)
Dept: PEDIATRICS | Facility: CLINIC | Age: 50
End: 2020-12-12

## 2020-12-12 DIAGNOSIS — M51.369 DDD (DEGENERATIVE DISC DISEASE), LUMBAR: ICD-10-CM

## 2020-12-14 RX ORDER — HYDROCODONE BITARTRATE AND ACETAMINOPHEN 5; 325 MG/1; MG/1
1 TABLET ORAL EVERY 6 HOURS PRN
Qty: 15 TABLET | Refills: 0 | Status: SHIPPED | OUTPATIENT
Start: 2020-12-18 | End: 2020-12-15

## 2020-12-14 NOTE — TELEPHONE ENCOUNTER
HYDROcodone-acetaminophen (NORCO) 5-325 MG tablet  Last Written Prescription Date:  11/19/2020  Last Fill Quantity: 15,  # refills: 0   Last office visit: 7/13/2020 with prescribing provider   Future Office Visit:      Routing refill request to provider for review/approval because:  Drug not on the [facility] refill protocol     Dawn Olmedo RN

## 2020-12-15 RX ORDER — HYDROCODONE BITARTRATE AND ACETAMINOPHEN 5; 325 MG/1; MG/1
1 TABLET ORAL EVERY 6 HOURS PRN
Qty: 15 TABLET | Refills: 0 | Status: SHIPPED | OUTPATIENT
Start: 2020-12-18 | End: 2020-12-19

## 2020-12-15 NOTE — TELEPHONE ENCOUNTER
Routing to Joselin Torres NP to please review when able.  Please advise.    Ale Martinez RN, [facility]

## 2020-12-17 ENCOUNTER — MYC MEDICAL ADVICE (OUTPATIENT)
Dept: PEDIATRICS | Facility: CLINIC | Age: 50
End: 2020-12-17

## 2020-12-17 DIAGNOSIS — M51.369 DDD (DEGENERATIVE DISC DISEASE), LUMBAR: ICD-10-CM

## 2020-12-18 NOTE — TELEPHONE ENCOUNTER
[facility] contacted, prescription was not filled and prescription cancelled.      Forwarding to covering providers to review/authorize prescription to pharmacy in [place] if appropriate per patient request.    Dawn Olmedo RN

## 2020-12-19 RX ORDER — HYDROCODONE BITARTRATE AND ACETAMINOPHEN 5; 325 MG/1; MG/1
1 TABLET ORAL EVERY 6 HOURS PRN
Qty: 15 TABLET | Refills: 0 | Status: SHIPPED | OUTPATIENT
Start: 2020-12-19 | End: 2021-01-20

## 2021-01-20 ENCOUNTER — MYC REFILL (OUTPATIENT)
Dept: PEDIATRICS | Facility: CLINIC | Age: 51
End: 2021-01-20

## 2021-01-20 DIAGNOSIS — M51.369 DDD (DEGENERATIVE DISC DISEASE), LUMBAR: ICD-10-CM

## 2021-01-21 RX ORDER — HYDROCODONE BITARTRATE AND ACETAMINOPHEN 5; 325 MG/1; MG/1
1 TABLET ORAL EVERY 6 HOURS PRN
Qty: 15 TABLET | Refills: 0 | Status: SHIPPED | OUTPATIENT
Start: 2021-01-21 | End: 2021-03-01

## 2021-01-21 NOTE — TELEPHONE ENCOUNTER
Routing refill request to provider for review/approval because:  Drug not on the [facility] refill protocol     Kathrine HERMANN, RN, CPN

## 2021-01-21 NOTE — TELEPHONE ENCOUNTER
Routing refill request to provider for review/approval because:  Drug not on the [facility] refill protocol     Raquel HERMANN, RN

## 2021-02-07 ENCOUNTER — HEALTH MAINTENANCE LETTER (OUTPATIENT)
Age: 51
End: 2021-02-07

## 2021-02-17 DIAGNOSIS — B00.9 RECURRENT HERPES SIMPLEX: ICD-10-CM

## 2021-02-17 RX ORDER — VALACYCLOVIR HYDROCHLORIDE 500 MG/1
TABLET, FILM COATED ORAL
Qty: 90 TABLET | Refills: 0 | Status: SHIPPED | OUTPATIENT
Start: 2021-02-17 | End: 2021-05-13

## 2021-03-01 ENCOUNTER — OFFICE VISIT (OUTPATIENT)
Dept: FAMILY MEDICINE | Facility: CLINIC | Age: 51
End: 2021-03-01
Payer: COMMERCIAL

## 2021-03-01 VITALS
HEIGHT: 65 IN | TEMPERATURE: 98.4 F | HEART RATE: 78 BPM | WEIGHT: 205 LBS | BODY MASS INDEX: 34.16 KG/M2 | RESPIRATION RATE: 17 BRPM | OXYGEN SATURATION: 99 % | SYSTOLIC BLOOD PRESSURE: 125 MMHG | DIASTOLIC BLOOD PRESSURE: 70 MMHG

## 2021-03-01 DIAGNOSIS — D22.9 NUMEROUS MOLES: ICD-10-CM

## 2021-03-01 DIAGNOSIS — R53.83 OTHER FATIGUE: ICD-10-CM

## 2021-03-01 DIAGNOSIS — Z80.8 FAMILY HX OF MELANOMA: ICD-10-CM

## 2021-03-01 DIAGNOSIS — M51.369 DDD (DEGENERATIVE DISC DISEASE), LUMBAR: ICD-10-CM

## 2021-03-01 DIAGNOSIS — L91.8 SKIN TAG: ICD-10-CM

## 2021-03-01 DIAGNOSIS — G47.00 INSOMNIA, UNSPECIFIED TYPE: ICD-10-CM

## 2021-03-01 DIAGNOSIS — I10 HTN, GOAL BELOW 140/90: Primary | ICD-10-CM

## 2021-03-01 PROCEDURE — 11200 RMVL SKIN TAGS UP TO&INC 15: CPT | Performed by: NURSE PRACTITIONER

## 2021-03-01 PROCEDURE — 99214 OFFICE O/P EST MOD 30 MIN: CPT | Mod: 25 | Performed by: NURSE PRACTITIONER

## 2021-03-01 RX ORDER — HYDROCODONE BITARTRATE AND ACETAMINOPHEN 5; 325 MG/1; MG/1
1 TABLET ORAL EVERY 6 HOURS PRN
Qty: 15 TABLET | Refills: 0 | Status: SHIPPED | OUTPATIENT
Start: 2021-03-01 | End: 2021-04-15

## 2021-03-01 ASSESSMENT — MIFFLIN-ST. JEOR: SCORE: 1537.81

## 2021-03-01 NOTE — PROGRESS NOTES
Subjective   Pita is a 51 year old who presents for the following health issues     HPI     Low back pain which will not usually stay in her low back but some days will have to ask her kids to pick her pants up off the floor   At that time only thought was fusion     Also has numerous moles and mother just diagnosed with melanoma   And also grandma has hx of melanoma     Continues to be tired all the time   Using a lot of energy drinks  to just keep some energy     Also, she has additional complaints of :  Hypertension Follow-up      Do you check your blood pressure regularly outside of the clinic? Yes     Are you following a low salt diet? Yes    Are your blood pressures ever more than 140 on the top number (systolic) OR more   than 90 on the bottom number (diastolic), for example 140/90? No      Labs reviewed in EPIC  BP Readings from Last 3 Encounters:   03/01/21 125/70   07/13/20 112/70   02/25/19 101/60    Wt Readings from Last 3 Encounters:   03/01/21 93 kg (205 lb)   07/13/20 91 kg (200 lb 11.2 oz)   02/25/19 90.3 kg (199 lb)                  Patient Active Problem List   Diagnosis     Insomnia     Bariatric surgery status     Chronic headache     Recurrent herpes simplex     CARDIOVASCULAR SCREENING; LDL GOAL LESS THAN 130     Obesity, Class I, BMI 30-34.9     Adjustment disorder with anxiety     Pyelonephritis     HTN, goal below 140/90     Rotator cuff disorder     S/P rotator cuff repair     Rotator cuff (capsule) sprain     Rotator cuff tear     DDD (degenerative disc disease), lumbar     DDD (degenerative disc disease), cervical     Abnormal TSH     Past Surgical History:   Procedure Laterality Date     ARTHROSCOPIC RECONSTRUCTION ANTERIOR AND POSTERIOR CRUCIATE LIGAMENT, COMBINED       BACK SURGERY  2014    steroid injections     BACK SURGERY  2014    steroid injections     BIOPSY  1991 & 1992    left ear & left breast     BREAST SURGERY  1993    lump removal     BREAST SURGERY  1992    lump  removal     COLONOSCOPY  1/22/2013    Procedure: COLONOSCOPY;  colonoscopy, loose stools, blood in stool;  Surgeon: Kamari Solomon MD;  Location: [facility]     CRYOTHERAPY  20 years ago     DILATION AND CURETTAGE SUCTION  2/24/2012    Procedure:DILATION AND CURETTAGE SUCTION; DILATION, CURETTAGE AND SUCTION ; Surgeon:JOHN HORNER; Location:[facility]     ENT SURGERY  1974    tonsils removed     LAPAROSCOPIC BYPASS GASTRIC       lump removed from left ear[       SURGICAL HISTORY OF -   1989    lt knee acl reconstruction     SURGICAL HISTORY OF -   2001    lt breast lump removal     SURGICAL HISTORY OF -   1999    gastric bypass (Katie en Y) by Dr. Fontenot at [facility]     SURGICAL HISTORY OF -   1974    tonsillectomy     SURGICAL HISTORY OF -   2005    LASIK       Social History     Tobacco Use     Smoking status: Never Smoker     Smokeless tobacco: Never Used     Tobacco comment: EX- SMOKED IN HOUSE FOR PAST 10 YEARS   Substance Use Topics     Alcohol use: No     Alcohol/week: 0.0 standard drinks     Family History   Problem Relation Age of Onset     Lipids Mother      Hypertension Mother      Anesthesia Reaction Mother      Hyperlipidemia Mother      C.A.D. Father         first MI at 40 years old (was a smoker)     Lipids Father      Heart Disease Father      Hypertension Father      C.A.D. Maternal Grandmother         not premature presentation     Cancer Maternal Grandmother         skin     C.A.D. Paternal Grandmother         not premature presentation     Diabetes Paternal Grandmother      Hypertension Paternal Grandmother      Anxiety Disorder Brother      Mental Illness Other      Breast Cancer No family hx of      Cancer - colorectal No family hx of          Current Outpatient Medications   Medication Sig Dispense Refill     amphetamine-dextroamphetamine (ADDERALL XR) 25 MG 24 hr capsule TAKE ONE CAPSULE BY MOUTH EVERY DAY       cholecalciferol (VITAMIN D3) 5000 units TABS tablet Take  "5,000 Units by mouth daily       ferrous sulfate (FEROSUL) 325 (65 Fe) MG tablet Take 1 tablet (325 mg) by mouth daily (with breakfast) 90 tablet 3     hydrochlorothiazide (HYDRODIURIL) 25 MG tablet Take 1 tablet (25 mg) by mouth daily 90 tablet 2     HYDROcodone-acetaminophen (NORCO) 5-325 MG tablet Take 1 tablet by mouth every 6 hours as needed for pain 15 tablet 0     potassium chloride ER (KLOR-CON) 20 MEQ CR tablet Take 1 tablet (20 mEq) by mouth daily 90 tablet 3     QUETIAPINE FUMARATE PO Take 50 mg by mouth At Bedtime        topiramate (TOPAMAX) 25 MG tablet 2 tablets at bedtime       valACYclovir (VALTREX) 500 MG tablet TAKE 1 TABLET BY MOUTH ONE TIME DAILY  90 tablet 0     Allergies   Allergen Reactions     Lomotil              Review of Systems   Constitutional: Positive for fatigue. Negative for activity change, appetite change, chills, fever and unexpected weight change.   HENT: Negative.    Respiratory: Negative.    Cardiovascular: Negative for chest pain, palpitations and peripheral edema.   Gastrointestinal: Negative.    Musculoskeletal: Positive for back pain. Negative for gait problem, joint swelling, myalgias and neck pain.   Skin: Positive for pallor. Negative for color change and rash.   Allergic/Immunologic: Negative for environmental allergies.   Neurological: Positive for light-headedness. Negative for dizziness, facial asymmetry, numbness, headaches and paresthesias.   Hematological: Negative for adenopathy. Does not bruise/bleed easily.   Psychiatric/Behavioral: Negative for behavioral problems, confusion, hallucinations, self-injury, sleep disturbance and suicidal ideas. The patient is not nervous/anxious.           Objective    /70   Pulse 78   Temp 98.4  F (36.9  C)   Resp 17   Ht 1.638 m (5' 4.5\")   Wt 93 kg (205 lb)   SpO2 99%   BMI 34.64 kg/m    Body mass index is 34.64 kg/m .   Wt Readings from Last 4 Encounters:   03/01/21 93 kg (205 lb)   07/13/20 91 kg (200 lb 11.2 " oz)   02/25/19 90.3 kg (199 lb)   01/22/19 86.5 kg (190 lb 9.6 oz)       Physical Exam   GENERAL: healthy, alert and no distress  HENT: ear canals and TM's normal, nose and mouth without ulcers or lesions  NECK: no adenopathy  RESP: lungs clear to auscultation - no rales, rhonchi or wheezes  ABDOMEN: soft, nontender  MS: no gross musculoskeletal defects noted, no edema  SKIN: Skin color, texture, turgor normal.   positives: numerous moles   Multiple regular brown pigmented macules and papules are identified on the body scattered .     OBJECTIVE:  Classic skin tags (acrochordon) noted left anterior chest wall .  PLAN:  Using sterile iris scissors, 1 large skin tag was snipped off at   their bases after cleansing with Betadine.  Bleeding was controlled   by pressure. Anesthetic was required with 1 % lidocaine. These pathognomonic   benign lesions are not sent for pathological exam. The procedure was   well tolerated. The patient will be alert for any signs of cutaneous   infection, and call if there are any problems.    NEURO: Normal strength and tone, mentation intact and speech normal  PSYCH: mentation appears normal, affect normal/bright      Assessment & Plan     HTN, goal below 140/90  HTN Plan:  1)  Medication: continue current medication regimen unchanged  2)  Dietary sodium restriction  3)  Regular aerobic exercise  Patient Education: Reviewed risks of hypertension and principles of   treatment.    DDD (degenerative disc disease), lumbar  The current medical regimen is effective.  Continue current medication regimen unchanged.  - HYDROcodone-acetaminophen (NORCO) 5-325 MG tablet  Dispense: 15 tablet; Refill: 0    Numerous moles  - DERMATOLOGY ADULT REFERRAL    Family hx of melanoma  - DERMATOLOGY ADULT REFERRAL    Other fatigue  - SLEEP EVALUATION & MANAGEMENT REFERRAL - ADULT -[facility] [place]  [phone] (Age 15 and up)    Insomnia, unspecified type  Referral:   - SLEEP EVALUATION &  "MANAGEMENT REFERRAL - [facility] [place]  [phone] (Age 15 and up)    Skin tag left rib   - REMOVAL OF SKIN TAGS, FIRST 15    0956}     BMI:   Estimated body mass index is 34.64 kg/m  as calculated from the following:    Height as of this encounter: 1.638 m (5' 4.5\").    Weight as of this encounter: 93 kg (205 lb).   Weight management plan: Discussed healthy diet and exercise guidelines    See Patient Instructions  Patient Instructions     PLAN:   1.   Symptomatic therapy suggested: Continue current medications as prescribed.   2.  Orders Placed This Encounter   Medications     HYDROcodone-acetaminophen (NORCO) 5-325 MG tablet     Sig: Take 1 tablet by mouth every 6 hours as needed for pain     Dispense:  15 tablet     Refill:  0     Orders Placed This Encounter   Procedures     REMOVAL OF SKIN TAGS, FIRST 15     DERMATOLOGY ADULT REFERRAL     SLEEP EVALUATION & MANAGEMENT REFERRAL - [facility] [place]  [phone] (Age 15 and up)       3. Patient needs to follow up in if no improvement,or sooner if worsening of symptoms or other symptoms develop.  CONSULTATION/REFERRAL to Dermatology  Sleep center referral   Schedule physical exam in July     No follow-ups on file.    SHAHRAM Ambriz CNP  [facility]          "

## 2021-03-02 NOTE — PATIENT INSTRUCTIONS
PLAN:   1.   Symptomatic therapy suggested: Continue current medications as prescribed.   2.  Orders Placed This Encounter   Medications     HYDROcodone-acetaminophen (NORCO) 5-325 MG tablet     Sig: Take 1 tablet by mouth every 6 hours as needed for pain     Dispense:  15 tablet     Refill:  0     Orders Placed This Encounter   Procedures     REMOVAL OF SKIN TAGS, FIRST 15     DERMATOLOGY ADULT REFERRAL     SLEEP EVALUATION & MANAGEMENT REFERRAL - ADULT [facility] [place]  [phone] (Age 15 and up)       3. Patient needs to follow up in if no improvement,or sooner if worsening of symptoms or other symptoms develop.  CONSULTATION/REFERRAL to Dermatology  Sleep center referral   Schedule physical exam in July

## 2021-03-07 ASSESSMENT — ENCOUNTER SYMPTOMS
HEADACHES: 0
COLOR CHANGE: 0
MYALGIAS: 0
HALLUCINATIONS: 0
DIZZINESS: 0
LIGHT-HEADEDNESS: 1
PALPITATIONS: 0
PARESTHESIAS: 0
UNEXPECTED WEIGHT CHANGE: 0
NERVOUS/ANXIOUS: 0
ACTIVITY CHANGE: 0
GASTROINTESTINAL NEGATIVE: 1
SLEEP DISTURBANCE: 0
BACK PAIN: 1
CHILLS: 0
FATIGUE: 1
APPETITE CHANGE: 0
RESPIRATORY NEGATIVE: 1
BRUISES/BLEEDS EASILY: 0
FACIAL ASYMMETRY: 0
ADENOPATHY: 0
NUMBNESS: 0
JOINT SWELLING: 0
CONFUSION: 0
FEVER: 0
NECK PAIN: 0

## 2021-03-29 NOTE — TELEPHONE ENCOUNTER
Asymptomatic moderate bilateral internal carotid artery stenosis  We will continue to follow with periodic duplex imaging and she will continue her current antiplatelet and statin therapy  hydrochlorothiazide (HYDRODIURIL) 25 MG tablet      Last Written Prescription Date: 05/31/17  Last Fill Quantity: 90, # refills: 1  Last Office Visit with [facility], [facility] or [facility] prescribing provider: 10/31/17.       Potassium   Date Value Ref Range Status   11/16/2017 3.3 (L) 3.4 - 5.3 mmol/L Final     Creatinine   Date Value Ref Range Status   11/16/2017 0.92 0.52 - 1.04 mg/dL Final     BP Readings from Last 3 Encounters:   11/16/17 115/78   10/31/17 130/70   06/21/17 125/79

## 2021-04-15 ENCOUNTER — MYC REFILL (OUTPATIENT)
Dept: FAMILY MEDICINE | Facility: CLINIC | Age: 51
End: 2021-04-15

## 2021-04-15 DIAGNOSIS — M51.369 DDD (DEGENERATIVE DISC DISEASE), LUMBAR: ICD-10-CM

## 2021-04-15 RX ORDER — HYDROCODONE BITARTRATE AND ACETAMINOPHEN 5; 325 MG/1; MG/1
1 TABLET ORAL EVERY 6 HOURS PRN
Qty: 15 TABLET | Refills: 0 | Status: SHIPPED | OUTPATIENT
Start: 2021-04-15 | End: 2021-06-08

## 2021-04-15 NOTE — TELEPHONE ENCOUNTER
Routing refill request to provider for review/approval because:  Drug not on the [facility] refill protocol     Kathrine HERMANN, RN

## 2021-05-13 DIAGNOSIS — B00.9 RECURRENT HERPES SIMPLEX: ICD-10-CM

## 2021-05-13 RX ORDER — VALACYCLOVIR HYDROCHLORIDE 500 MG/1
TABLET, FILM COATED ORAL
Qty: 90 TABLET | Refills: 0 | Status: SHIPPED | OUTPATIENT
Start: 2021-05-13 | End: 2021-08-16

## 2021-05-29 NOTE — STROKE CODE NOTE - NS AS ED PRESENTATION YN
Advised Tylenol/ NSAIDs and exercise.  Rx Medrol Dose sudeep take as directed.  We will check back x-ray.  
Rx Bactrim DS take  as directed.  
Stable on levothyroxine  
Yes

## 2021-06-08 ENCOUNTER — MYC REFILL (OUTPATIENT)
Dept: FAMILY MEDICINE | Facility: CLINIC | Age: 51
End: 2021-06-08

## 2021-06-08 DIAGNOSIS — M51.369 DDD (DEGENERATIVE DISC DISEASE), LUMBAR: ICD-10-CM

## 2021-06-09 DIAGNOSIS — Z12.31 ENCOUNTER FOR SCREENING MAMMOGRAM FOR BREAST CANCER: ICD-10-CM

## 2021-06-09 PROCEDURE — 77067 SCR MAMMO BI INCL CAD: CPT | Mod: TC | Performed by: RADIOLOGY

## 2021-06-09 RX ORDER — HYDROCODONE BITARTRATE AND ACETAMINOPHEN 5; 325 MG/1; MG/1
1 TABLET ORAL EVERY 6 HOURS PRN
Qty: 15 TABLET | Refills: 0 | Status: SHIPPED | OUTPATIENT
Start: 2021-06-09 | End: 2021-08-04

## 2021-06-23 ENCOUNTER — MYC MEDICAL ADVICE (OUTPATIENT)
Dept: FAMILY MEDICINE | Facility: CLINIC | Age: 51
End: 2021-06-23

## 2021-06-29 ASSESSMENT — SLEEP AND FATIGUE QUESTIONNAIRES
HOW LIKELY ARE YOU TO NOD OFF OR FALL ASLEEP WHEN YOU ARE A PASSENGER IN A CAR FOR AN HOUR WITHOUT A BREAK: SLIGHT CHANCE OF DOZING
HOW LIKELY ARE YOU TO NOD OFF OR FALL ASLEEP WHILE LYING DOWN TO REST IN THE AFTERNOON WHEN CIRCUMSTANCES PERMIT: SLIGHT CHANCE OF DOZING
HOW LIKELY ARE YOU TO NOD OFF OR FALL ASLEEP WHILE SITTING AND TALKING TO SOMEONE: WOULD NEVER DOZE
HOW LIKELY ARE YOU TO NOD OFF OR FALL ASLEEP WHILE WATCHING TV: SLIGHT CHANCE OF DOZING
HOW LIKELY ARE YOU TO NOD OFF OR FALL ASLEEP WHILE SITTING QUIETLY AFTER LUNCH WITHOUT ALCOHOL: WOULD NEVER DOZE
HOW LIKELY ARE YOU TO NOD OFF OR FALL ASLEEP WHILE SITTING AND READING: SLIGHT CHANCE OF DOZING
HOW LIKELY ARE YOU TO NOD OFF OR FALL ASLEEP WHILE SITTING INACTIVE IN A PUBLIC PLACE: WOULD NEVER DOZE
HOW LIKELY ARE YOU TO NOD OFF OR FALL ASLEEP IN A CAR, WHILE STOPPED FOR A FEW MINUTES IN TRAFFIC: WOULD NEVER DOZE

## 2021-06-29 ASSESSMENT — ENCOUNTER SYMPTOMS
INSOMNIA: 1
ARTHRALGIAS: 1
TROUBLE SWALLOWING: 0
SMELL DISTURBANCE: 0
HOT FLASHES: 0
DECREASED LIBIDO: 0
DYSURIA: 0
BACK PAIN: 1
SINUS PAIN: 0
MUSCLE WEAKNESS: 0
MUSCLE CRAMPS: 0
DEPRESSION: 0
HEMATURIA: 0
DIFFICULTY URINATING: 0
DECREASED CONCENTRATION: 1
SINUS CONGESTION: 0
STIFFNESS: 1
HOARSE VOICE: 0
NECK MASS: 0
TASTE DISTURBANCE: 1
PANIC: 0
FLANK PAIN: 0
MYALGIAS: 0
JOINT SWELLING: 1
NECK PAIN: 1
NERVOUS/ANXIOUS: 0
SORE THROAT: 0

## 2021-06-30 ENCOUNTER — VIRTUAL VISIT (OUTPATIENT)
Dept: SLEEP MEDICINE | Facility: CLINIC | Age: 51
End: 2021-06-30
Attending: NURSE PRACTITIONER
Payer: COMMERCIAL

## 2021-06-30 VITALS — HEIGHT: 66 IN | WEIGHT: 205 LBS | BODY MASS INDEX: 32.95 KG/M2

## 2021-06-30 DIAGNOSIS — R53.83 OTHER FATIGUE: ICD-10-CM

## 2021-06-30 DIAGNOSIS — G47.00 INSOMNIA, UNSPECIFIED TYPE: ICD-10-CM

## 2021-06-30 DIAGNOSIS — G47.33 OSA (OBSTRUCTIVE SLEEP APNEA): Primary | ICD-10-CM

## 2021-06-30 DIAGNOSIS — R53.83 FATIGUE, UNSPECIFIED TYPE: ICD-10-CM

## 2021-06-30 PROCEDURE — 99205 OFFICE O/P NEW HI 60 MIN: CPT | Mod: 95 | Performed by: PSYCHIATRY & NEUROLOGY

## 2021-06-30 ASSESSMENT — MIFFLIN-ST. JEOR: SCORE: 1553.68

## 2021-06-30 NOTE — PATIENT INSTRUCTIONS
Your BMI is Body mass index is 33.59 kg/m .  Weight management is a personal decision.  If you are interested in exploring weight loss strategies, the following discussion covers the approaches that may be successful. Body mass index (BMI) is one way to tell whether you are at a healthy weight, overweight, or obese. It measures your weight in relation to your height.  A BMI of 18.5 to 24.9 is in the healthy range. A person with a BMI of 25 to 29.9 is considered overweight, and someone with a BMI of 30 or greater is considered obese. More than two-thirds of American adults are considered overweight or obese.  Being overweight or obese increases the risk for further weight gain. Excess weight may lead to heart disease and diabetes.  Creating and following plans for healthy eating and physical activity may help you improve your health.  Weight control is part of healthy lifestyle and includes exercise, emotional health, and healthy eating habits. Careful eating habits lifelong are the mainstay of weight control. Though there are significant health benefits from weight loss, long-term weight loss with diet alone may be very difficult to achieve- studies show long-term success with dietary management in less than 10% of people. Attaining a healthy weight may be especially difficult to achieve in those with severe obesity. In some cases, medications, devices and surgical management might be considered.  What can you do?  If you are overweight or obese and are interested in methods for weight loss, you should discuss this with your provider.     Consider reducing daily calorie intake by 500 calories.     Keep a food journal.     Avoiding skipping meals, consider cutting portions instead.    Diet combined with exercise helps maintain muscle while optimizing fat loss. Strength training is particularly important for building and maintaining muscle mass. Exercise helps reduce stress, increase energy, and improves fitness.  Increasing exercise without diet control, however, may not burn enough calories to loose weight.       Start walking three days a week 10-20 minutes at a time    Work towards walking thirty minutes five days a week     Eventually, increase the speed of your walking for 1-2 minutes at time    In addition, we recommend that you review healthy lifestyles and methods for weight loss available through the National Institutes of Health patient information sites:  http://win.niddk.nih.gov/publications/index.htm    And look into health and wellness programs that may be available through your health insurance provider, employer, local community center, or [employer].    Weight management plan: Patient was referred to their PCP to discuss a diet and exercise plan.

## 2021-06-30 NOTE — PROGRESS NOTES
Pita Maher is a 51 year old who is being evaluated via a billable video visit.      How would you like to obtain your AVS? MyChart  If the video visit is dropped, the invitation should be resent by: Text to cell phone: [phone]   Will anyone else be joining your video visit? No    Does patient have any form of state insurance? NO     Do you have wifi? YES  Do you have a smart phone? YES  Can you download an sergey on your phone comfortably with out assistance? YES  Can you watch a Youtube video? YES      Barbara Sanders MA    Video Start Time: 1005    Video-Visit Details    Type of service:  Video Visit    Video End Time:1115    Originating Location (pt. Location): Home    Distant Location (provider location): [facility]    Platform used for Video Visit: [facility]    Patient referred by Jaz Torres to evaluate for tiredness during the day despite addressing quetiapine and topiramate.       We had a long discussion today and made a few very important discoveries.     1.  She clearly has a circadian rhythm delay.  Which explains the difficulty falling asleep at night as well as difficulty waking up in the AM.  This has been masked by her sedatives she has taken over the years as well as the morning adderral she takes.  We discussed circadian rhythms an their management.  She will get a 10,000 lux light box and use every AM at 0900 and then take low dose melatonin 1mg at 2100.  She will use blue light blocking glasses from 2100 until bedtime which may be midnight or later.     2.  She has some Vitamin B12 deficiency and as she is only taking oral replacement with a history of gastric bypass would recommend a recheck on her levels (they were last checked and low 2 years ago) and if still low consider IM monthly injections.  Patient indicates that she will talk to Jaz Torres about and I will send an Epic inbox note.      3. Concern for sleep disordered breathing.  She snores, has had witnessed apneas  and her fatigue during the day could be JOHAN.  Have ordered an in lab PSG.      She agrees with the plan.     Discussed not to take melatonin or sedative if she is going to be driving afterwards.      All questions were answered.    It is a great privilege being asked to participate in this patients care.  The patient has been advised on the importance in of never operating operating a motor vehicle while tired or sleepy.        I visited with the patient directly but also extensively reviewed chart and coordinated care. Total time spent in the care of this patient today (Face-to-Face time + chart time, , and coordination of care) was greater than 80 minutes.

## 2021-07-01 DIAGNOSIS — Z98.84 BARIATRIC SURGERY STATUS: Primary | ICD-10-CM

## 2021-07-22 ENCOUNTER — APPOINTMENT (OUTPATIENT)
Dept: OBGYN | Facility: CLINIC | Age: 51
End: 2021-07-22
Payer: COMMERCIAL

## 2021-07-22 DIAGNOSIS — N76.0 ACUTE VAGINITIS: ICD-10-CM

## 2021-07-22 PROBLEM — F31.9 BIPOLAR DISORDER, UNSPECIFIED: Chronic | Status: ACTIVE | Noted: 2017-06-15

## 2021-07-22 PROBLEM — F41.9 ANXIETY DISORDER, UNSPECIFIED: Chronic | Status: ACTIVE | Noted: 2017-06-15

## 2021-07-22 PROCEDURE — 99213 OFFICE O/P EST LOW 20 MIN: CPT | Mod: 95

## 2021-07-22 NOTE — HISTORY OF PRESENT ILLNESS
[FreeTextEntry1] : 50yo f c/o 1 week of dysuria and frequency. Has tried increased hydration and cranberry juice but still is havign symptom. No recent intercouse, no pool or swimming. \par \par \par \par \par \par

## 2021-07-22 NOTE — PLAN
[FreeTextEntry1] : suspected UTI\par \par has been treated cipro in the past. rx renewed for 5 day and continue the AZO over the counter. \par If not relieved then will come in for physical appoint. \par \par \par

## 2021-07-22 NOTE — REASON FOR VISIT
[Home] : at home, [unfilled] , at the time of the visit. [Medical Office: ([facility])___] : at the medical office located in  [Verbal consent obtained from patient] : the patient, [unfilled]

## 2021-07-24 DIAGNOSIS — E87.6 HYPOKALEMIA: ICD-10-CM

## 2021-07-26 RX ORDER — POTASSIUM CHLORIDE 1500 MG/1
TABLET, EXTENDED RELEASE ORAL
Qty: 90 TABLET | Refills: 0 | Status: SHIPPED | OUTPATIENT
Start: 2021-07-26 | End: 2021-10-29

## 2021-07-26 NOTE — TELEPHONE ENCOUNTER
Prescription approved per [facility] Refill Protocol.        Anaid Coto RN  [facility]

## 2021-08-04 ENCOUNTER — MYC REFILL (OUTPATIENT)
Dept: FAMILY MEDICINE | Facility: CLINIC | Age: 51
End: 2021-08-04

## 2021-08-04 DIAGNOSIS — M51.369 DDD (DEGENERATIVE DISC DISEASE), LUMBAR: ICD-10-CM

## 2021-08-05 RX ORDER — HYDROCODONE BITARTRATE AND ACETAMINOPHEN 5; 325 MG/1; MG/1
1 TABLET ORAL EVERY 6 HOURS PRN
Qty: 15 TABLET | Refills: 0 | Status: SHIPPED | OUTPATIENT
Start: 2021-08-05 | End: 2021-09-17

## 2021-08-14 DIAGNOSIS — B00.9 RECURRENT HERPES SIMPLEX: ICD-10-CM

## 2021-08-14 DIAGNOSIS — I10 HTN, GOAL BELOW 140/90: ICD-10-CM

## 2021-08-16 ENCOUNTER — LAB (OUTPATIENT)
Dept: LAB | Facility: CLINIC | Age: 51
End: 2021-08-16
Payer: COMMERCIAL

## 2021-08-16 DIAGNOSIS — Z98.84 BARIATRIC SURGERY STATUS: ICD-10-CM

## 2021-08-16 LAB — VIT B12 SERPL-MCNC: 1370 PG/ML (ref 193–986)

## 2021-08-16 PROCEDURE — 36415 COLL VENOUS BLD VENIPUNCTURE: CPT

## 2021-08-16 PROCEDURE — 82607 VITAMIN B-12: CPT

## 2021-08-16 RX ORDER — HYDROCHLOROTHIAZIDE 25 MG/1
TABLET ORAL
Qty: 90 TABLET | Refills: 0 | Status: SHIPPED | OUTPATIENT
Start: 2021-08-16 | End: 2021-11-18

## 2021-08-16 RX ORDER — VALACYCLOVIR HYDROCHLORIDE 500 MG/1
TABLET, FILM COATED ORAL
Qty: 90 TABLET | Refills: 0 | Status: SHIPPED | OUTPATIENT
Start: 2021-08-16 | End: 2021-11-18

## 2021-08-17 NOTE — RESULT ENCOUNTER NOTE
Gilbert Maher,    Attached are your test results.  Vitamin B12 is elevated   Need to back down on how much you are taking      Please contact us if you have any questions.    Jaz Torres, CNP

## 2021-09-18 ENCOUNTER — HEALTH MAINTENANCE LETTER (OUTPATIENT)
Age: 51
End: 2021-09-18

## 2021-09-21 ENCOUNTER — TRANSCRIPTION ENCOUNTER (OUTPATIENT)
Age: 51
End: 2021-09-21

## 2021-09-21 ENCOUNTER — APPOINTMENT (OUTPATIENT)
Dept: OBGYN | Facility: CLINIC | Age: 51
End: 2021-09-21
Payer: COMMERCIAL

## 2021-09-21 ENCOUNTER — ASOB RESULT (OUTPATIENT)
Age: 51
End: 2021-09-21

## 2021-09-21 VITALS
DIASTOLIC BLOOD PRESSURE: 70 MMHG | WEIGHT: 260 LBS | BODY MASS INDEX: 35.21 KG/M2 | HEIGHT: 72 IN | SYSTOLIC BLOOD PRESSURE: 130 MMHG

## 2021-09-21 DIAGNOSIS — Z86.73 PERSONAL HISTORY OF TRANSIENT ISCHEMIC ATTACK (TIA), AND CEREBRAL INFARCTION W/OUT RESIDUAL DEFICITS: ICD-10-CM

## 2021-09-21 LAB
BILIRUB UR QL STRIP: NORMAL
CLARITY UR: CLEAR
COLLECTION METHOD: NORMAL
GLUCOSE UR-MCNC: NORMAL
HBV SURFACE AG SER QL: NONREACTIVE
HCG UR QL: 0.2 EU/DL
HCV AB SER QL: NONREACTIVE
HCV S/CO RATIO: 0.12 S/CO
HGB UR QL STRIP.AUTO: NORMAL
HIV1+2 AB SPEC QL IA.RAPID: NONREACTIVE
KETONES UR-MCNC: NORMAL
LEUKOCYTE ESTERASE UR QL STRIP: NORMAL
NITRITE UR QL STRIP: NORMAL
PH UR STRIP: 5.5
PROT UR STRIP-MCNC: NORMAL
SP GR UR STRIP: 1.03
T PALLIDUM AB SER QL IA: NEGATIVE

## 2021-09-21 PROCEDURE — 82270 OCCULT BLOOD FECES: CPT

## 2021-09-21 PROCEDURE — 99396 PREV VISIT EST AGE 40-64: CPT

## 2021-09-21 PROCEDURE — 36415 COLL VENOUS BLD VENIPUNCTURE: CPT

## 2021-09-21 RX ORDER — CLONAZEPAM 1 MG/1
1 TABLET ORAL
Refills: 0 | Status: ACTIVE | COMMUNITY

## 2021-09-21 RX ORDER — LAMOTRIGINE 150 MG/1
TABLET ORAL
Refills: 0 | Status: ACTIVE | COMMUNITY

## 2021-09-23 LAB
BACTERIA UR CULT: NORMAL
C TRACH RRNA SPEC QL NAA+PROBE: NOT DETECTED
CANDIDA VAG CYTO: NOT DETECTED
G VAGINALIS+PREV SP MTYP VAG QL MICRO: NOT DETECTED
HPV HIGH+LOW RISK DNA PNL CVX: NOT DETECTED
N GONORRHOEA RRNA SPEC QL NAA+PROBE: NOT DETECTED
SOURCE TP AMPLIFICATION: NORMAL
T VAGINALIS VAG QL WET PREP: NOT DETECTED

## 2021-09-27 LAB — CYTOLOGY CVX/VAG DOC THIN PREP: NORMAL

## 2021-10-04 ENCOUNTER — OFFICE VISIT (OUTPATIENT)
Dept: DERMATOLOGY | Facility: CLINIC | Age: 51
End: 2021-10-04
Attending: NURSE PRACTITIONER
Payer: COMMERCIAL

## 2021-10-04 DIAGNOSIS — Z80.8 FAMILY HX OF MELANOMA: ICD-10-CM

## 2021-10-04 DIAGNOSIS — D22.9 MULTIPLE BENIGN NEVI: ICD-10-CM

## 2021-10-04 DIAGNOSIS — D23.9 DERMATOFIBROMA: ICD-10-CM

## 2021-10-04 DIAGNOSIS — L81.4 SOLAR LENTIGO: Primary | ICD-10-CM

## 2021-10-04 DIAGNOSIS — L82.1 SEBORRHEIC KERATOSIS: ICD-10-CM

## 2021-10-04 DIAGNOSIS — D18.01 CHERRY ANGIOMA: ICD-10-CM

## 2021-10-04 PROCEDURE — 99203 OFFICE O/P NEW LOW 30 MIN: CPT | Performed by: DERMATOLOGY

## 2021-10-04 NOTE — PROGRESS NOTES
[facility] Dermatology Note  Encounter Date: Oct 4, 2021  Office Visit     Dermatology Problem List:  1. Seborrheic keratoses    ____________________________________________    Assessment & Plan:    # Benign lesions: Multiple benign nevi, solar lentigos, seborrheic keratoses, cherry angiomas, dermatofibroma. Explained to patient benign nature of lesion. No treatment is necessary at this time unless the lesion changes or becomes symptomatic.     - ABCDs of melanoma were discussed and self skin checks were advised.  - Sun precaution was advised including the use of sun screens of SPF 30 or higher, sun protective clothing, and avoidance of tanning beds.  - Given family history of melanoma and prior use of indoor tanning beds, did recommend annual skin examinations    Procedures Performed:   None    Follow-up: 1 year(s) in-person, or earlier for new or changing lesions    Staff and Medical Student:     I saw and examined this patient in the presence of Dr. Ramos.     Kamari Kirby, MS4  [facility]    Staff Physician:  I was present with the medical student who participated in the service and in the documentation of the note. I have verified the history and personally performed the physical exam and medical decision making. I agree with the assessment and plan of care as documented in the note.     Juan Manuel Ramos MD  Pronouns: he/him/his    Department of Dermatology  [facility]: Phone: [phone], Fax:[phone]  [facility]: Phone: [phone] Fax: [phone]    ____________________________________________    CC: Skin Check (Full skin check. No personal hx of SC. Family hx-mother/aunt/grandmother melanoma)    HPI:  Ms. Pita Maher is a(n) 51 year old female who presents today as a new patient for full body skin check.    The patient presents to the  clinic today for a full body skin exam. She shares that her mother, aunt, and maternal grandmother had all been diagnosed with melanoma. She does not recall any other skin cancer in family members. She has two areas of concern--a mole on the left side of her abdomen and a mole on the upper right eyelid. She notes that she frequently used tanning beds in her teens and 20s.     Patient is otherwise feeling well, without additional skin concerns.    Labs Reviewed:  N/A    Physical Exam:  Vitals: There were no vitals taken for this visit.  SKIN: Full skin, which includes the head/face, both arms, chest, back, abdomen,both legs, genitalia and/or groin buttocks, digits and/or nails, was examined.  - There is a waxy stuck on tan to brown papule on the left abdomen.   - There is a waxy stuck on tan to brown papule on the medial aspect of the superior right eyelid.   - There is a firm tan/flesh colored papule that dimples with lateral pressure on the anterior lower leg.   - No other lesions of concern on areas examined.     Medications:  Current Outpatient Medications   Medication     amphetamine-dextroamphetamine (ADDERALL XR) 25 MG 24 hr capsule     cholecalciferol (VITAMIN D3) 5000 units TABS tablet     cyanocobalamin (VITAMIN B-12) 500 MCG SUBL sublingual tablet     ferrous sulfate (FEROSUL) 325 (65 Fe) MG tablet     hydrochlorothiazide (HYDRODIURIL) 25 MG tablet     HYDROcodone-acetaminophen (NORCO) 5-325 MG tablet     potassium chloride ER (KLOR-CON M) 20 MEQ CR tablet     QUETIAPINE FUMARATE PO     topiramate (TOPAMAX) 25 MG tablet     valACYclovir (VALTREX) 500 MG tablet     No current facility-administered medications for this visit.      Past Medical History:   Patient Active Problem List   Diagnosis     Insomnia     Bariatric surgery status     Chronic headache     Recurrent herpes simplex     CARDIOVASCULAR SCREENING; LDL GOAL LESS THAN 130     Obesity, Class I, BMI 30-34.9     Adjustment disorder with anxiety      Pyelonephritis     HTN, goal below 140/90     Rotator cuff disorder     S/P rotator cuff repair     Rotator cuff (capsule) sprain     Rotator cuff tear     DDD (degenerative disc disease), lumbar     DDD (degenerative disc disease), cervical     Abnormal TSH     Past Medical History:   Diagnosis Date     Abnormal Pap smear     20 years ago and had coloposcopy and froze     Chronic headache      Chronic headache      DDD (degenerative disc disease), cervical 6/13/2014     DDD (degenerative disc disease), lumbar 11/25/2013     Depressive disorder     no longer treating for this as of April 2017     Depressive disorder, not elsewhere classified 1993    limited episodes in 1990s, no clinically significant depression since then     Headache(784.0) 1983     Herpes simplex without mention of complication      Hypertension Jan 2014     Lumbago 1988    ongoing problem since 18 years old     Migraine      Obesity, unspecified     always a larger child, went on to have Katie en Y 1999, with BMI > 40 at time of surgery     Ovarian cyst         CC Jaz Torres, APRN CNP  [address] [place] [address] on close of this encounter.

## 2021-10-04 NOTE — NURSING NOTE
Pita Maher's goals for this visit include:   Chief Complaint   Patient presents with     Skin Check     Full skin check. No personal hx of SC. Family hx-mother/aunt/grandmother melanoma       She requests these members of her care team be copied on today's visit information:     PCP: Jaz Torres    Referring Provider:  Jaz Torres, SHAHRAM CNP  [address] [place] [address]    There were no vitals taken for this visit.    Do you need any medication refills at today's visit? Britany Quick on 10/4/2021 at 3:50 PM

## 2021-10-04 NOTE — LETTER
10/4/2021         RE: Pita Maher  [address]  [facility] [address]        Dear Colleague,    Thank you for referring your patient, Pita Maher, to the [facility]. Please see a copy of my visit note below.    [facility] Note  Encounter Date: Oct 4, 2021  Office Visit     Dermatology Problem List:  1. Seborrheic keratoses    ____________________________________________    Assessment & Plan:    # Benign lesions: Multiple benign nevi, solar lentigos, seborrheic keratoses, cherry angiomas, dermatofibroma. Explained to patient benign nature of lesion. No treatment is necessary at this time unless the lesion changes or becomes symptomatic.     - ABCDs of melanoma were discussed and self skin checks were advised.  - Sun precaution was advised including the use of sun screens of SPF 30 or higher, sun protective clothing, and avoidance of tanning beds.  - Given family history of melanoma and prior use of indoor tanning beds, did recommend annual skin examinations    Procedures Performed:   None    Follow-up: 1 year(s) in-person, or earlier for new or changing lesions    Staff and Medical Student:     I saw and examined this patient in the presence of Dr. Ramos.     Kamari Kirby, MS4  [facility]    Staff Physician:  I was present with the medical student who participated in the service and in the documentation of the note. I have verified the history and personally performed the physical exam and medical decision making. I agree with the assessment and plan of care as documented in the note.     Juan Manuel Ramos MD  Pronouns: he/him/his    Department of Dermatology  [facility]: Phone: [phone], Fax:[phone]  [facility]: Phone: [phone] Fax:  [phone]    ____________________________________________    CC: Skin Check (Full skin check. No personal hx of SC. Family hx-mother/aunt/grandmother melanoma)    HPI:  Ms. Pita Maher is a(n) 51 year old female who presents today as a new patient for full body skin check.    The patient presents to the clinic today for a full body skin exam. She shares that her mother, aunt, and maternal grandmother had all been diagnosed with melanoma. She does not recall any other skin cancer in family members. She has two areas of concern--a mole on the left side of her abdomen and a mole on the upper right eyelid. She notes that she frequently used tanning beds in her teens and 20s.     Patient is otherwise feeling well, without additional skin concerns.    Labs Reviewed:  N/A    Physical Exam:  Vitals: There were no vitals taken for this visit.  SKIN: Full skin, which includes the head/face, both arms, chest, back, abdomen,both legs, genitalia and/or groin buttocks, digits and/or nails, was examined.  - There is a waxy stuck on tan to brown papule on the left abdomen.   - There is a waxy stuck on tan to brown papule on the medial aspect of the superior right eyelid.   - There is a firm tan/flesh colored papule that dimples with lateral pressure on the anterior lower leg.   - No other lesions of concern on areas examined.     Medications:  Current Outpatient Medications   Medication     amphetamine-dextroamphetamine (ADDERALL XR) 25 MG 24 hr capsule     cholecalciferol (VITAMIN D3) 5000 units TABS tablet     cyanocobalamin (VITAMIN B-12) 500 MCG SUBL sublingual tablet     ferrous sulfate (FEROSUL) 325 (65 Fe) MG tablet     hydrochlorothiazide (HYDRODIURIL) 25 MG tablet     HYDROcodone-acetaminophen (NORCO) 5-325 MG tablet     potassium chloride ER (KLOR-CON M) 20 MEQ CR tablet     QUETIAPINE FUMARATE PO     topiramate (TOPAMAX) 25 MG tablet     valACYclovir (VALTREX) 500 MG tablet     No current facility-administered  medications for this visit.      Past Medical History:   Patient Active Problem List   Diagnosis     Insomnia     Bariatric surgery status     Chronic headache     Recurrent herpes simplex     CARDIOVASCULAR SCREENING; LDL GOAL LESS THAN 130     Obesity, Class I, BMI 30-34.9     Adjustment disorder with anxiety     Pyelonephritis     HTN, goal below 140/90     Rotator cuff disorder     S/P rotator cuff repair     Rotator cuff (capsule) sprain     Rotator cuff tear     DDD (degenerative disc disease), lumbar     DDD (degenerative disc disease), cervical     Abnormal TSH     Past Medical History:   Diagnosis Date     Abnormal Pap smear     20 years ago and had coloposcopy and froze     Chronic headache      Chronic headache      DDD (degenerative disc disease), cervical 6/13/2014     DDD (degenerative disc disease), lumbar 11/25/2013     Depressive disorder     no longer treating for this as of April 2017     Depressive disorder, not elsewhere classified 1993    limited episodes in 1990s, no clinically significant depression since then     Headache(784.0) 1983     Herpes simplex without mention of complication      Hypertension Jan 2014     Lumbago 1988    ongoing problem since 18 years old     Migraine      Obesity, unspecified     always a larger child, went on to have Katie en Y 1999, with BMI > 40 at time of surgery     Ovarian cyst         CC Jaz Torres, APRN CNP  [address] [place] [address] on close of this encounter.      Again, thank you for allowing me to participate in the care of your patient.        Sincerely,        Juan Manuel Ramos MD

## 2021-10-05 LAB — BACTERIA UR CULT: NORMAL

## 2021-10-29 ENCOUNTER — MYC REFILL (OUTPATIENT)
Dept: PEDIATRICS | Facility: CLINIC | Age: 51
End: 2021-10-29

## 2021-10-29 DIAGNOSIS — E87.6 HYPOKALEMIA: ICD-10-CM

## 2021-10-29 RX ORDER — POTASSIUM CHLORIDE 1500 MG/1
20 TABLET, EXTENDED RELEASE ORAL DAILY
Qty: 90 TABLET | Refills: 1 | Status: SHIPPED | OUTPATIENT
Start: 2021-10-29 | End: 2022-03-11

## 2021-10-29 NOTE — TELEPHONE ENCOUNTER
"Requested Prescriptions   Pending Prescriptions Disp Refills     potassium chloride ER (KLOR-CON M) 20 MEQ CR tablet 90 tablet 0       Potassium Supplements Protocol Failed - 10/29/2021 11:31 AM        Failed - Normal serum potassium in past 12 months     Recent Labs   Lab Test 09/17/20  1034   POTASSIUM 3.7                    Passed - Recent (12 mo) or future (30 days) visit within the authorizing provider's department     Patient has had an office visit with the authorizing provider or a provider within the authorizing providers department within the previous 12 mos or has a future within next 30 days. See \"Patient Info\" tab in inbasket, or \"Choose Columns\" in Meds & Orders section of the refill encounter.              Passed - Medication is active on med list        Passed - Patient is age 18 or older             Raquel HERMANN, RN    "

## 2021-11-18 ENCOUNTER — TRANSFERRED RECORDS (OUTPATIENT)
Dept: HEALTH INFORMATION MANAGEMENT | Facility: CLINIC | Age: 51
End: 2021-11-18
Payer: COMMERCIAL

## 2021-11-18 ENCOUNTER — MYC REFILL (OUTPATIENT)
Dept: PEDIATRICS | Facility: CLINIC | Age: 51
End: 2021-11-18
Payer: COMMERCIAL

## 2021-11-18 DIAGNOSIS — Z13.29 SCREENING FOR THYROID DISORDER: ICD-10-CM

## 2021-11-18 DIAGNOSIS — Z13.6 CARDIOVASCULAR SCREENING; LDL GOAL LESS THAN 130: Primary | ICD-10-CM

## 2021-11-18 DIAGNOSIS — I10 HTN, GOAL BELOW 140/90: ICD-10-CM

## 2021-11-18 DIAGNOSIS — B00.9 RECURRENT HERPES SIMPLEX: ICD-10-CM

## 2021-11-18 DIAGNOSIS — Z13.1 SCREENING FOR DIABETES MELLITUS (DM): ICD-10-CM

## 2021-11-18 RX ORDER — HYDROCHLOROTHIAZIDE 25 MG/1
25 TABLET ORAL DAILY
Qty: 90 TABLET | Refills: 0 | Status: SHIPPED | OUTPATIENT
Start: 2021-11-18 | End: 2022-03-11

## 2021-11-18 RX ORDER — VALACYCLOVIR HYDROCHLORIDE 500 MG/1
500 TABLET, FILM COATED ORAL DAILY
Qty: 90 TABLET | Refills: 0 | Status: SHIPPED | OUTPATIENT
Start: 2021-11-18 | End: 2022-02-14

## 2021-11-18 NOTE — TELEPHONE ENCOUNTER
Routing refill request to provider for review/approval because:  Labs not current:  Creatinine and potassium        Edilma Doll RN  [facility]

## 2022-01-27 ENCOUNTER — NURSE TRIAGE (OUTPATIENT)
Dept: FAMILY MEDICINE | Facility: CLINIC | Age: 52
End: 2022-01-27
Payer: COMMERCIAL

## 2022-01-27 NOTE — TELEPHONE ENCOUNTER
"Patient calling due to ongoing period. Pt reports she has had heavy vaginal bleeding for 3 weeks, going on week 4.   Patient reports large clots & tissue that is being discharged. \"There is clots on my pad and in the toilet\". Pt states that when she stands, it is like a rush of more blood coming out. \"It has been heavy bleeding for 3 weeks and is not letting up\"  Pt reports that she uses 4/7 pads per day, and changes it every 2 hours or so.   Patient states that she has had heavy periods for the past year, and they've been coming every 2 weeks.   Pt is not taking any hormone medications and is not on any blood thinners.   Pt reports cramping - she states it is tolerable.  Pt reports some lightheadedness when standing/walking. Pt denies passing out/fainting.     Per protocol, patient should be seen in UC/ED for severe bleeding. Writer advised patient to go to ED due to limited resources at . Pt states she will go once she finds someone to watch her children.     Patient requesting message sent to PCP also.     Routing to PCP as FYOSMAN.    Janet Henderson RN, BSN  [facility]      Reason for Disposition    SEVERE vaginal bleeding (e.g., soaking 2 pads or tampons per hour and present 2 or more hours; 1 menstrual cup every 2 hours)    Additional Information    Negative: Pregnant > 20 weeks (5 months or more)    Negative: Pregnant < 20 weeks (less than 5 months)    Negative: Postpartum (from 0 to 6 weeks after delivery)    Negative: Vaginal discharge is the main symptom and bleeding is slight    Negative: SEVERE vaginal bleeding (e.g., continuous red blood from vagina, or large blood clots) and very weak (can't stand)    Negative: Passed out (i.e., fainted, collapsed and was not responding)    Negative: Difficult to awaken or acting confused (e.g., disoriented, slurred speech)    Negative: Shock suspected (e.g., cold/pale/clammy skin, too weak to stand, low BP, rapid pulse)    Negative: Sounds like a " "life-threatening emergency to the triager    Negative: SEVERE abdominal pain (e.g., excruciating)    Negative: SEVERE dizziness (e.g., unable to stand, requires support to walk, feels like passing out now)    Answer Assessment - Initial Assessment Questions  1. AMOUNT: \"Describe the bleeding that you are having.\"     - SPOTTING: spotting, or pinkish / brownish mucous discharge; does not fill panti-liner or pad     - MILD:  less than 1 pad / hour; less than patient's usual menstrual bleeding    - MODERATE: 1-2 pads / hour; 1 menstrual cup every 6 hours; small-medium blood clots (e.g., pea, grape, small coin)    - SEVERE: soaking 2 or more pads/hour for 2 or more hours; 1 menstrual cup every 2 hours; bleeding not contained by pads or continuous red blood from vagina; large blood clots (e.g., golf ball, large coin)       Blood clots on pads every time going to the bathroom, clots in the toilet \"Large clots\" - going through probably 4-7 a day. Changes the pad every 2 hours.  2. ONSET: \"When did the bleeding begin?\" \"Is it continuing now?\"      3 weeks ago.   3. MENSTRUAL PERIOD: \"When was the last normal menstrual period?\" \"How is this different than your period?\"      Heavier and longer than normal.   4. REGULARITY: \"How regular are your periods?\"      Typically pretty regular periods since last year. Usually done within 3 days.  Not regular anymore - have it every couple weeks. Very frequent periods.   5. ABDOMINAL PAIN: \"Do you have any pain?\" \"How bad is the pain?\"  (e.g., Scale 1-10; mild, moderate, or severe)    - MILD (1-3): doesn't interfere with normal activities, abdomen soft and not tender to touch     - MODERATE (4-7): interferes with normal activities or awakens from sleep, tender to touch     - SEVERE (8-10): excruciating pain, doubled over, unable to do any normal activities       Has cramping today.   6. PREGNANCY: \"Could you be pregnant?\" \"Are you sexually active?\" \"Did you recently give birth?\"      " "No  7. BREASTFEEDING: \"Are you breastfeeding?\"      No  8. HORMONES: \"Are you taking any hormone medications, prescription or OTC?\" (e.g., birth control pills, estrogen)      No   9. BLOOD THINNERS: \"Do you take any blood thinners?\" (e.g., Coumadin/warfarin, Pradaxa/dabigatran, aspirin)      No   10. CAUSE: \"What do you think is causing the bleeding?\" (e.g., recent gyn surgery, recent gyn procedure; known bleeding disorder, cervical cancer, polycystic ovarian disease, fibroids)          Unknown   11. HEMODYNAMIC STATUS: \"Are you weak or feeling lightheaded?\" If so, ask: \"Can you stand and walk normally?\"         Feel lightheaded last few days. Getting over a cold. Dizzy with standing.   12. OTHER SYMPTOMS: \"What other symptoms are you having with the bleeding?\" (e.g., passed tissue, vaginal discharge, fever, menstrual-type cramps)        Cramps present. No fever.    Protocols used: VAGINAL BLEEDING - [id number]      "

## 2022-02-15 ENCOUNTER — MYC REFILL (OUTPATIENT)
Dept: FAMILY MEDICINE | Facility: CLINIC | Age: 52
End: 2022-02-15
Payer: COMMERCIAL

## 2022-02-15 DIAGNOSIS — M51.369 DDD (DEGENERATIVE DISC DISEASE), LUMBAR: ICD-10-CM

## 2022-02-16 RX ORDER — HYDROCODONE BITARTRATE AND ACETAMINOPHEN 5; 325 MG/1; MG/1
1 TABLET ORAL EVERY 6 HOURS PRN
Qty: 15 TABLET | Refills: 0 | Status: SHIPPED | OUTPATIENT
Start: 2022-02-16 | End: 2022-03-11

## 2022-02-16 NOTE — TELEPHONE ENCOUNTER
Routing refill request to provider for review/approval because:  Drug not on the [facility] refill protocol   Zaira Denny BSN, RN

## 2022-03-02 ENCOUNTER — APPOINTMENT (OUTPATIENT)
Dept: OBGYN | Facility: CLINIC | Age: 52
End: 2022-03-02
Payer: COMMERCIAL

## 2022-03-02 PROCEDURE — 99442: CPT

## 2022-03-02 NOTE — REASON FOR VISIT
[Home] : at home, [unfilled] , at the time of the visit. [Medical Office: ([facility])___] : at the medical office located in  [Verbal consent obtained from patient] : the patient, [unfilled]

## 2022-03-02 NOTE — HISTORY OF PRESENT ILLNESS
[FreeTextEntry1] : TIARA WALLACE 52 year old female  reports urinary frequency, urgency and burning with urination.  Pt denies fever, back pain or vaginal complaints.\par \par UTI\par \par Treatment: \par \par Cipro x 5 days\par \par Increase hydration\par

## 2022-03-09 ASSESSMENT — ENCOUNTER SYMPTOMS
BREAST MASS: 0
ARTHRALGIAS: 1
CHILLS: 0
JOINT SWELLING: 0
EYE PAIN: 0
HEADACHES: 0
SHORTNESS OF BREATH: 0
DYSURIA: 0
NAUSEA: 0
CONSTIPATION: 0
MYALGIAS: 0
ABDOMINAL PAIN: 0
HEMATOCHEZIA: 0
HEARTBURN: 0
NERVOUS/ANXIOUS: 1
HEMATURIA: 0
COUGH: 0
PARESTHESIAS: 0
SORE THROAT: 0
FEVER: 0
DIZZINESS: 1
WEAKNESS: 0
FREQUENCY: 0
DIARRHEA: 0
PALPITATIONS: 0

## 2022-03-11 ENCOUNTER — ANCILLARY PROCEDURE (OUTPATIENT)
Dept: GENERAL RADIOLOGY | Facility: CLINIC | Age: 52
End: 2022-03-11
Attending: NURSE PRACTITIONER
Payer: COMMERCIAL

## 2022-03-11 ENCOUNTER — OFFICE VISIT (OUTPATIENT)
Dept: FAMILY MEDICINE | Facility: CLINIC | Age: 52
End: 2022-03-11
Payer: COMMERCIAL

## 2022-03-11 VITALS
DIASTOLIC BLOOD PRESSURE: 74 MMHG | SYSTOLIC BLOOD PRESSURE: 138 MMHG | TEMPERATURE: 97.4 F | WEIGHT: 208.4 LBS | BODY MASS INDEX: 34.72 KG/M2 | RESPIRATION RATE: 16 BRPM | HEIGHT: 65 IN | OXYGEN SATURATION: 98 % | HEART RATE: 69 BPM

## 2022-03-11 DIAGNOSIS — Z98.84 BARIATRIC SURGERY STATUS: ICD-10-CM

## 2022-03-11 DIAGNOSIS — M79.671 RIGHT FOOT PAIN: ICD-10-CM

## 2022-03-11 DIAGNOSIS — Z13.0 SCREENING FOR DISORDER OF BLOOD AND BLOOD-FORMING ORGANS: ICD-10-CM

## 2022-03-11 DIAGNOSIS — Z13.6 CARDIOVASCULAR SCREENING; LDL GOAL LESS THAN 130: ICD-10-CM

## 2022-03-11 DIAGNOSIS — Z12.31 ENCOUNTER FOR SCREENING MAMMOGRAM FOR BREAST CANCER: ICD-10-CM

## 2022-03-11 DIAGNOSIS — I10 HTN, GOAL BELOW 140/90: ICD-10-CM

## 2022-03-11 DIAGNOSIS — Z12.4 SCREENING FOR MALIGNANT NEOPLASM OF CERVIX: ICD-10-CM

## 2022-03-11 DIAGNOSIS — K43.9 VENTRAL HERNIA WITHOUT OBSTRUCTION OR GANGRENE: ICD-10-CM

## 2022-03-11 DIAGNOSIS — Z79.899 ENCOUNTER FOR LONG-TERM (CURRENT) USE OF MEDICATIONS: ICD-10-CM

## 2022-03-11 DIAGNOSIS — E87.6 HYPOKALEMIA: ICD-10-CM

## 2022-03-11 DIAGNOSIS — Z00.00 ROUTINE GENERAL MEDICAL EXAMINATION AT A HEALTH CARE FACILITY: Primary | ICD-10-CM

## 2022-03-11 DIAGNOSIS — B00.9 RECURRENT HERPES SIMPLEX: ICD-10-CM

## 2022-03-11 DIAGNOSIS — Z11.59 NEED FOR HEPATITIS C SCREENING TEST: ICD-10-CM

## 2022-03-11 DIAGNOSIS — Z13.29 SCREENING FOR THYROID DISORDER: ICD-10-CM

## 2022-03-11 DIAGNOSIS — N93.8 DUB (DYSFUNCTIONAL UTERINE BLEEDING): ICD-10-CM

## 2022-03-11 DIAGNOSIS — Z12.11 SCREEN FOR COLON CANCER: ICD-10-CM

## 2022-03-11 DIAGNOSIS — F43.22 ADJUSTMENT DISORDER WITH ANXIETY: ICD-10-CM

## 2022-03-11 DIAGNOSIS — M51.369 DDD (DEGENERATIVE DISC DISEASE), LUMBAR: ICD-10-CM

## 2022-03-11 DIAGNOSIS — H91.90 DECREASED HEARING, UNSPECIFIED LATERALITY: ICD-10-CM

## 2022-03-11 DIAGNOSIS — M77.12 LEFT LATERAL EPICONDYLITIS: ICD-10-CM

## 2022-03-11 DIAGNOSIS — Z13.1 SCREENING FOR DIABETES MELLITUS (DM): ICD-10-CM

## 2022-03-11 PROCEDURE — 87624 HPV HI-RISK TYP POOLED RSLT: CPT | Performed by: NURSE PRACTITIONER

## 2022-03-11 PROCEDURE — 99214 OFFICE O/P EST MOD 30 MIN: CPT | Mod: 25 | Performed by: NURSE PRACTITIONER

## 2022-03-11 PROCEDURE — G0145 SCR C/V CYTO,THINLAYER,RESCR: HCPCS | Performed by: NURSE PRACTITIONER

## 2022-03-11 PROCEDURE — 99396 PREV VISIT EST AGE 40-64: CPT | Performed by: NURSE PRACTITIONER

## 2022-03-11 PROCEDURE — 73630 X-RAY EXAM OF FOOT: CPT | Mod: RT | Performed by: FAMILY MEDICINE

## 2022-03-11 RX ORDER — POTASSIUM CHLORIDE 1500 MG/1
20 TABLET, EXTENDED RELEASE ORAL DAILY
Qty: 90 TABLET | Refills: 3 | Status: SHIPPED | OUTPATIENT
Start: 2022-03-11 | End: 2022-03-16

## 2022-03-11 RX ORDER — HYDROCHLOROTHIAZIDE 25 MG/1
25 TABLET ORAL DAILY
Qty: 90 TABLET | Refills: 3 | Status: SHIPPED | OUTPATIENT
Start: 2022-03-11 | End: 2023-03-07

## 2022-03-11 RX ORDER — TRAMADOL HYDROCHLORIDE 50 MG/1
50 TABLET ORAL EVERY 6 HOURS PRN
Qty: 15 TABLET | Refills: 0 | Status: SHIPPED | OUTPATIENT
Start: 2022-03-11 | End: 2022-05-03

## 2022-03-11 RX ORDER — VALACYCLOVIR HYDROCHLORIDE 500 MG/1
500 TABLET, FILM COATED ORAL DAILY
Qty: 90 TABLET | Refills: 3 | Status: SHIPPED | OUTPATIENT
Start: 2022-03-11 | End: 2023-08-03

## 2022-03-11 ASSESSMENT — ENCOUNTER SYMPTOMS
BREAST MASS: 0
CONSTIPATION: 0
COUGH: 0
CHILLS: 0
DIARRHEA: 0
NERVOUS/ANXIOUS: 1
NAUSEA: 0
HEMATURIA: 0
EYE PAIN: 0
SORE THROAT: 0
JOINT SWELLING: 0
MYALGIAS: 0
WEAKNESS: 0
HEMATOCHEZIA: 0
HEADACHES: 0
HEARTBURN: 0
PARESTHESIAS: 0
PALPITATIONS: 0
DYSURIA: 0
FEVER: 0
ARTHRALGIAS: 1
ABDOMINAL PAIN: 0
FREQUENCY: 0
SHORTNESS OF BREATH: 0
DIZZINESS: 1

## 2022-03-11 NOTE — PATIENT INSTRUCTIONS
PLAN:   1.   Symptomatic therapy suggested: will try Tramadol instead of Vicodin.  2.  Orders Placed This Encounter   Medications     Multiple Vitamins-Minerals (WOMENS 50+ MULTI VITAMIN/MIN PO)     Orders Placed This Encounter   Procedures     REVIEW OF HEALTH MAINTENANCE PROTOCOL ORDERS     *MA Screening Digital Bilateral     US Pelvic Transabdominal and Transvaginal     XR Foot Right G/E 3 Views     Hepatitis C Screen Reflex to HCV RNA Quant and Genotype     Lipid panel reflex to direct LDL Fasting     CBC with platelets     Comprehensive metabolic panel     TSH with free T4 reflex     Vitamin D Deficiency     Vitamin B12     Ferritin     Albumin Random Urine Quantitative with Creat Ratio     COLOGUARD(EXACT SCIENCES)     Adult General Surg Referral     Adult Audiology Referral     Ob/Gyn Referral     Wrist/Arm/Hand Supplies Order for DME - ONLY FOR DME     3. Patient needs to follow up in if no improvement,or sooner if worsening of symptoms or other symptoms develop.  FURTHER TESTING:       - pelvic  ultrasound       - mammogram  I will place order. Please call [phone] to schedule.  CONSULTATION/REFERRAL to Gynecology and Audiology   Please call [phone] to make appointment  if you do not hear from referrals in the next few days.   FUTURE LABS:       - Schedule a fasting blood draw   Work on weight loss  Regular exercise  Will follow up and/or notify patient of  results via My Chart to determine further need for followup  Follow up office visit in one year for annual health maintenance exam, sooner PRN.    Patient Education     Understanding Lateral Epicondylitis     Tendons are strong bands of tissue that connect muscles to bones. Lateral epicondylitis affects the tendons that connect muscles in the forearm to the lateral epicondyle. This is the bony knob on the outer side of the elbow. The condition occurs if the extensor tendons of the wrist become painful and swollen (irritated). This can cause pain  in the elbow, forearm, and wrist. Because the condition is sometimes caused by playing tennis, it's also known as  tennis elbow.     How to say it  LA-tuhr-jo ann [id number]   What causes lateral epicondylitis?  The condition most often occurs because of overuse. This can be from any activity that repeatedly puts stress on the forearm extensor muscles or tendons and wrist. For instance, playing tennis, lifting weights, cutting meat, painting, and typing can all cause the condition. Wear and tear of the tendons from aging or an injury to the tendons can also cause the condition.   Symptoms of lateral epicondylitis  The most common symptom is pain. You may feel it on the outer side of the elbow and down the back of the forearm. It may be worse when moving or using the elbow, forearm, or wrist. You may also feel pain when gripping or lifting things.   Treatment for lateral epicondylitis  Treatments may include:    Resting the elbow, forearm, and wrist. You ll need to avoid movements that can make your symptoms worse. You also may need to avoid certain sports and types of work for a time. This helps relieve symptoms and prevent further damage to the tendons.    Changing the action that caused the problem. For instance, if the tendons were damaged from playing tennis, it may help to change your playing technique or use different equipment. This helps prevent further damage to the tendons.    Using cold packs. Putting an ice pack on the injured area can help reduce pain and swelling.    Taking pain medicines. Taking prescription or over-the-counter pain medicines may help reduce pain and swelling.      Wearing a brace. This helps reduce strain on the muscles and tendons in the forearm, which may relieve symptoms. It's very important to wear the brace properly.    Doing exercises and physical therapy. These help improve strength and range of motion in the elbow, forearm, and wrist.    Getting shots of medicine into  the injured area. These may help relieve symptoms for a time.    Having surgery. This may be an option if other treatments fail to relieve symptoms. In many cases, the surgeon removes the damaged tissue.  Possible complications of lateral epicondylitis  If the tendons involved don t heal properly, symptoms may return or get worse. To help prevent this, follow your treatment plan as directed.   When to call your healthcare provider  Call your healthcare provider right away if you have any of these:    Fever of 100.4 F (38 C) or higher, or as directed by your provider    Chills    Redness, swelling, or warmth in the elbow or forearm that gets worse    Symptoms that don t get better with treatment, or get worse    New symptoms  [employer] last reviewed this educational content on 6/1/2019 2000-2021 [employer]. All rights reserved. This information is not intended as a substitute for professional medical care. Always follow your healthcare professional's instructions.

## 2022-03-11 NOTE — PROGRESS NOTES
SUBJECTIVE:   CC: Pita Maher is an 52 year old woman who presents for preventive health visit.     Patient has been advised of split billing requirements and indicates understanding: Yes  Healthy Habits:     Getting at least 3 servings of Calcium per day:  Yes    Bi-annual eye exam:  Yes    Dental care twice a year:  Yes    Sleep apnea or symptoms of sleep apnea:  None    Diet:  Regular (no restrictions)    Frequency of exercise:  None    Taking medications regularly:  Yes    Medication side effects:  None    PHQ-2 Total Score: 2    Additional concerns today:  Yes    Lump on stomach  Menstrual cycle heavy and lasting a long time  pain      Today's PHQ-2 Score:   PHQ-2 ( 1999 [employer]) 3/9/2022   Q1: Little interest or pleasure in doing things 1   Q2: Feeling down, depressed or hopeless 1   PHQ-2 Score 2   PHQ-2 Total Score (12-17 Years)- Positive if 3 or more points; Administer PHQ-A if positive -   Q1: Little interest or pleasure in doing things Several days   Q2: Feeling down, depressed or hopeless Several days   PHQ-2 Score 2       Abuse: Current or Past (Physical, Sexual or Emotional) - No  Do you feel safe in your environment? Yes        Social History     Tobacco Use     Smoking status: Never Smoker     Smokeless tobacco: Never Used     Tobacco comment: EX- SMOKED IN HOUSE FOR PAST 10 YEARS   Substance Use Topics     Alcohol use: No     Alcohol/week: 0.0 standard drinks         Alcohol Use 3/9/2022   Prescreen: >3 drinks/day or >7 drinks/week? Not Applicable   Prescreen: >3 drinks/day or >7 drinks/week? -       Reviewed orders with patient.  Reviewed health maintenance and updated orders accordingly - Yes  Lab work is in process  Labs reviewed in EPIC  BP Readings from Last 3 Encounters:   03/11/22 (!) 143/87   03/01/21 125/70   07/13/20 112/70    Wt Readings from Last 3 Encounters:   03/11/22 94.5 kg (208 lb 6.4 oz)   06/30/21 93 kg (205 lb)   03/01/21 93 kg (205 lb)                  Patient  Active Problem List   Diagnosis     Insomnia     Bariatric surgery status     Chronic headache     Recurrent herpes simplex     CARDIOVASCULAR SCREENING; LDL GOAL LESS THAN 130     Obesity, Class I, BMI 30-34.9     Adjustment disorder with anxiety     Pyelonephritis     HTN, goal below 140/90     Rotator cuff disorder     S/P rotator cuff repair     Rotator cuff (capsule) sprain     Rotator cuff tear     DDD (degenerative disc disease), lumbar     DDD (degenerative disc disease), cervical     Abnormal TSH     Past Surgical History:   Procedure Laterality Date     ARTHROSCOPIC RECONSTRUCTION ANTERIOR AND POSTERIOR CRUCIATE LIGAMENT, COMBINED       BACK SURGERY  2014    steroid injections     BACK SURGERY  2014    steroid injections     BIOPSY  1991 & 1992    left ear & left breast     BREAST SURGERY  1993    lump removal     BREAST SURGERY  1992    lump removal     COLONOSCOPY  1/22/2013    Procedure: COLONOSCOPY;  colonoscopy, loose stools, blood in stool;  Surgeon: Kamari Solomon MD;  Location: [facility]     CRYOTHERAPY  20 years ago     DILATION AND CURETTAGE SUCTION  2/24/2012    Procedure:DILATION AND CURETTAGE SUCTION; DILATION, CURETTAGE AND SUCTION ; Surgeon:JOHN HORNER; Location:[facility]     ENT SURGERY  1974    tonsils removed     LAPAROSCOPIC BYPASS GASTRIC       lump removed from left ear[       SURGICAL HISTORY OF -   1989    lt knee acl reconstruction     SURGICAL HISTORY OF -   2001    lt breast lump removal     SURGICAL HISTORY OF -   1999    gastric bypass (Katie en Y) by Dr. Fontenot at [facility]     SURGICAL HISTORY OF -   1974    tonsillectomy     SURGICAL HISTORY OF -   2005    LASIK       Social History     Tobacco Use     Smoking status: Never Smoker     Smokeless tobacco: Never Used     Tobacco comment: EX- SMOKED IN HOUSE FOR PAST 10 YEARS   Substance Use Topics     Alcohol use: No     Alcohol/week: 0.0 standard drinks     Family History   Problem Relation Age of Onset      Lipids Mother      Hypertension Mother      Anesthesia Reaction Mother      Hyperlipidemia Mother      Other Cancer Mother         Skin cancer     C.A.D. Father         first MI at 40 years old (was a smoker)     Lipids Father      Heart Disease Father      Hypertension Father      C.A.D. Maternal Grandmother         not premature presentation     Cancer Maternal Grandmother         skin     Other Cancer Maternal Grandmother         Skin cancer     C.A.D. Paternal Grandmother         not premature presentation     Diabetes Paternal Grandmother      Hypertension Paternal Grandmother      Depression Paternal Grandmother      Anxiety Disorder Brother      Mental Illness Other      Depression Other      Other Cancer Maternal Half-Sister         Skin cancer     Breast Cancer No family hx of      Cancer - colorectal No family hx of          Current Outpatient Medications   Medication Sig Dispense Refill     amphetamine-dextroamphetamine (ADDERALL XR) 25 MG 24 hr capsule Take 30 mg by mouth TAKE ONE CAPSULE BY MOUTH EVERY DAY       cholecalciferol (VITAMIN D3) 5000 units TABS tablet Take 5,000 Units by mouth daily       cyanocobalamin (VITAMIN B-12) 500 MCG SUBL sublingual tablet Place 500 mcg under the tongue daily       ferrous sulfate (FEROSUL) 325 (65 Fe) MG tablet Take 1 tablet (325 mg) by mouth daily (with breakfast) 90 tablet 3     hydrochlorothiazide (HYDRODIURIL) 25 MG tablet Take 1 tablet (25 mg) by mouth daily 90 tablet 3     Multiple Vitamins-Minerals (WOMENS 50+ MULTI VITAMIN/MIN PO)        QUETIAPINE FUMARATE PO Take 50 mg by mouth At Bedtime        topiramate (TOPAMAX) 25 MG tablet 2 tablets at bedtime       valACYclovir (VALTREX) 500 MG tablet Take 1 tablet (500 mg) by mouth daily 90 tablet 3     potassium chloride ER (KLOR-CON M15) 15 MEQ CR tablet Take 2 tablets (30 mEq) by mouth daily 180 tablet 3     Allergies   Allergen Reactions     Lomotil        Breast Cancer Screening:    Breast CA Risk  Assessment (FHS-7) 3/9/2022   Do you have a family history of breast, colon, or ovarian cancer? No / Unknown         Mammogram Screening: Recommended annual mammography  Pertinent mammograms are reviewed under the imaging tab.    History of abnormal Pap smear: NO - age 30- 65 PAP every 3 years recommended  PAP / HPV Latest Ref Rng & Units 3/11/2022 1/22/2019 12/30/2016   PAP   Negative for Intraepithelial Lesion or Malignancy (NILM) - -   PAP (Historical) - - NIL NIL   HPV16 Negative Negative Negative Negative   HPV18 Negative Negative Negative Negative   HRHPV Negative Positive(A) Negative Negative     Reviewed and updated as needed this visit by clinical staff                  Reviewed and updated as needed this visit by Provider                 Past Medical History:   Diagnosis Date     Abnormal Pap smear     20 years ago and had coloposcopy and froze     Chronic headache      Chronic headache      DDD (degenerative disc disease), cervical 6/13/2014     DDD (degenerative disc disease), lumbar 11/25/2013     Depressive disorder     no longer treating for this as of April 2017     Depressive disorder, not elsewhere classified 1993    limited episodes in 1990s, no clinically significant depression since then     Headache(784.0) 1983     Herpes simplex without mention of complication      Hypertension Jan 2014     Lumbago 1988    ongoing problem since 18 years old     Migraine      Obesity, unspecified     always a larger child, went on to have Katie en Y 1999, with BMI > 40 at time of surgery     Ovarian cyst       Past Surgical History:   Procedure Laterality Date     ARTHROSCOPIC RECONSTRUCTION ANTERIOR AND POSTERIOR CRUCIATE LIGAMENT, COMBINED       BACK SURGERY  2014    steroid injections     BACK SURGERY  2014    steroid injections     BIOPSY  1991 & 1992    left ear & left breast     BREAST SURGERY  1993    lump removal     BREAST SURGERY  1992    lump removal     COLONOSCOPY  1/22/2013    Procedure:  COLONOSCOPY;  colonoscopy, loose stools, blood in stool;  Surgeon: Kamari Solomon MD;  Location: [facility]     CRYOTHERAPY  20 years ago     DILATION AND CURETTAGE SUCTION  2/24/2012    Procedure:DILATION AND CURETTAGE SUCTION; DILATION, CURETTAGE AND SUCTION ; Surgeon:JOHN HORNER; Location:[facility]     ENT SURGERY  1974    tonsils removed     LAPAROSCOPIC BYPASS GASTRIC       lump removed from left ear[       SURGICAL HISTORY OF -   1989    lt knee acl reconstruction     SURGICAL HISTORY OF -   2001    lt breast lump removal     SURGICAL HISTORY OF -   1999    gastric bypass (Katie en Y) by Dr. Fontenot at [facility]     SURGICAL HISTORY OF -   1974    tonsillectomy     SURGICAL HISTORY OF -   2005    LASIK       Review of Systems   Constitutional: Negative for chills and fever.   HENT: Positive for hearing loss. Negative for congestion, ear pain and sore throat.         Has been checked in the past and can not make out words  Was a DJ for years    Eyes: Negative for pain and visual disturbance.   Respiratory: Negative for cough and shortness of breath.    Cardiovascular: Positive for chest pain. Negative for palpitations and peripheral edema.        Has been having intermittent pains in her chest that are sharp   Will last for a minute or so   A lot of time is in the morning      Gastrointestinal: Negative for abdominal pain, constipation, diarrhea, heartburn, hematochezia and nausea.        Had a bulge on abdomen which has been getting larger and now is showing through her clothing      Breasts:  Negative for tenderness, breast mass and discharge.   Genitourinary: Positive for vaginal bleeding. Negative for dysuria, frequency, genital sores, hematuria, pelvic pain, urgency and vaginal discharge.        Her menses are very irregular and lasting 4 to 5 weeks and has been going on for about a year with large clots    Musculoskeletal: Positive for arthralgias. Negative for joint swelling and myalgias.     "    Has some pain in left arm for about 2 months at the elbow   Also right foot painful for about 6 months   No injury that she can remember    Skin: Negative for rash.   Neurological: Positive for dizziness. Negative for weakness, headaches and paresthesias.   Psychiatric/Behavioral: Positive for mood changes. The patient is nervous/anxious.         Put her resignation in last night and has a new job in the works now as work has been miserable   Sees psychiatry and she does the Adderal and sleeping pills but nothing for anxiety or depression as since no alcohol has been good with that         OBJECTIVE:   BP (!) 143/87 (BP Location: Left arm, Patient Position: Sitting)   Pulse 69   Temp 97.4  F (36.3  C) (Temporal)   Resp 16   Ht 1.638 m (5' 4.5\")   Wt 94.5 kg (208 lb 6.4 oz)   SpO2 98%   BMI 35.22 kg/m     BP Readings from Last 6 Encounters:   03/11/22 138/74   03/01/21 125/70   07/13/20 112/70   02/25/19 101/60   01/22/19 120/70   04/30/18 120/76       Physical Exam  GENERAL: healthy, alert and no distress  EYES: Eyes grossly normal to inspection and conjunctivae and sclerae normal  HENT: ear canals and TM's normal, nose and mouth without ulcers or lesions  NECK: no adenopathy, no asymmetry, masses, or scars and thyroid normal to palpation  RESP: lungs clear to auscultation - no rales, rhonchi or wheezes  BREAST: normal without masses, tenderness or nipple discharge and no palpable axillary masses or adenopathy  CV: regular rates and rhythm, no murmur, click or rub, peripheral pulses strong and no peripheral edema  ABDOMEN: soft, nontender, no hepatosplenomegaly, no masses and bowel sounds normal   (female): normal female external genitalia, normal urethral meatus, vaginal mucosa pink, moist, well rugated, and normal cervix/adnexa/uterus without masses or discharge  MS: no gross musculoskeletal defects noted, no edema  ELBOWS: Tender over lateral epicondyle left  side(s).  Resisted external rotation and " extension of the wrist and fingers reproduces pain.  FEET: tenderness to the inferomedial aspect of the effected heel(s)at the insertion of the plantar fascia.  SKIN: no suspicious lesions or rashes  NEURO: Normal strength and tone, mentation intact and speech normal  PSYCH: mentation appears normal, affect normal/bright  LYMPH: no cervical, supraclavicular, axillary, or inguinal adenopathy    Diagnostic Test Results:  Labs reviewed in Epic  Results for orders placed or performed in visit on 03/11/22   XR Foot Right G/E 3 Views     Status: None    Narrative    RIGHT FOOT THREE OR MORE VIEWS  3/11/2022 12:38 PM    INDICATION: Right foot pain.    COMPARISON: 5/25/2017      Impression    IMPRESSION: Allowing for differences in technique, there is little  interval change. Mild hallux valgus deformity. Cyst or erosive change  medial aspect right first metatarsal head, similar to prior. Bipartite  hallux tibial sesamoid. No acute right foot fracture or dislocation.  Joint spaces are unchanged. Accessory os navicularis. Generalized foot  soft tissue swelling.     GISSELLE VAUGHAN MD         SYSTEM ID:  [id number]   Results for orders placed or performed in visit on 03/11/22   HPV High Risk Types DNA Cervical     Status: Abnormal   Result Value Ref Range    Other HR HPV Positive (A) Negative    HPV16 DNA Negative Negative    HPV18 DNA Negative Negative    FINAL DIAGNOSIS       This patient's sample is positive for other HR HPV DNA (types 31, 33, 35, 39, 45, 51, 52, 56, 58, 59, 66 or 68), not HPV 16 or HPV 18 DNA. This result requires clinical correlation with concurrent cytology findings.      This test was developed and its performance characteristics determined by the [facility]. It has not been cleared or approved by the FDA. The laboratory is regulated under CLIA as qualified to perform high-complexity testing. This test is used for clinical purposes. It should  not be regarded as investigational or for research.    METHODOLOGY: The Roche [place] 4800 system uses automated extraction, simultaneous amplification of HPV (L1 region) and beta-globin, followed by real time detection of fluorescent labeled HPV and beta globin using specific oligonucleotide probes. The test specifically identified types HPV 16 DNA and HPV 18 DNA while concurrently detecting the rest of the high risk types (31, 33, 35, 39, 45, 51, 52, 56, 58, 59, 66 or 68).    COMMENTS: This test is not intended for use as a screening device for woman under age 30 with normal cervical cytology. Results should be correlated with cytologic and histologic findings. Close clinical followup is recommended.       Pap Screen with HPV - recommended age 30 - 65 years     Status: None   Result Value Ref Range    Interpretation        Negative for Intraepithelial Lesion or Malignancy (NILM)    Comment         Papanicolaou Test Limitations:  Cervical cytology is a screening test with limited sensitivity, and regular screening is critical for cancer prevention.  Pap tests are primarily effective for the diagnosis/prevention of squamous cell carcinoma, not adenocarcinoma or other cancers.        Specimen Adequacy       Satisfactory for evaluation, endocervical/transformation zone component present    Clinical Information       none      Reflex Testing Yes regardless of result     Previous Abnormal?       No      Performing Labs       The technical component of this testing was completed at [facility]         ASSESSMENT/PLAN:   Pita was seen today for physical.    Diagnoses and all orders for this visit:    Routine general medical examination at a health care facility  -     REVIEW OF HEALTH MAINTENANCE PROTOCOL ORDERS    Need for hepatitis C screening test  -     Hepatitis C Screen Reflex to HCV RNA Quant and Genotype; Future    Encounter for long-term (current) use of  medications  Reviewed medications     Screening for malignant neoplasm of cervix  -     Pap Screen with HPV - recommended age 30 - 65 years  -     HPV Hold (Lab Only)  -     HPV High Risk Types DNA Cervical    CARDIOVASCULAR SCREENING; LDL GOAL LESS THAN 130  -     Lipid panel reflex to direct LDL Fasting; Future    Screening for diabetes mellitus (DM)  -     Comprehensive metabolic panel; Future    Screening for thyroid disorder  -     TSH with free T4 reflex; Future    Screening for disorder of blood and blood-forming organs  -     CBC with platelets; Future    Adjustment disorder with anxiety  FOLLOW UP WITH SPECIALIST :Psychiatry    HTN, goal below 140/90  -     Comprehensive metabolic panel; Future  -     Albumin Random Urine Quantitative with Creat Ratio; Future  -     hydrochlorothiazide (HYDRODIURIL) 25 MG tablet; Take 1 tablet (25 mg) by mouth daily  HTN Plan:  1)  Medication: continue current medication regimen unchanged  2)  Dietary sodium restriction  3)  Regular aerobic exercise  4)  Recheck in 1 year, sooner should new symptoms or   problems arise.  5) See todays orders.    Patient Education: Reviewed risks of hypertension and principles of   treatment.      Bariatric surgery status  -     Vitamin D Deficiency; Future  -     Vitamin B12; Future  -     Ferritin; Future    Encounter for screening mammogram for breast cancer  -     *MA Screening Digital Bilateral; Future    Screen for colon cancer  -     COLOGUARD(EXACT SCIENCES)    Ventral hernia without obstruction or gangrene  -     Adult General Surg Referral; Future  FOLLOW UP WITH SPECIALIST :Surgery    Decreased hearing, unspecified laterality  -     Adult Audiology Referral; Future    DUB (dysfunctional uterine bleeding)  -     US Pelvic Transabdominal and Transvaginal; Future  -     Ob/Gyn Referral; Future  Will follow up and/or notify patient of  results via My Chart to determine further need for followup  Need to rule out fibroid or mass      Left lateral epicondylitis  -     Wrist/Arm/Hand Supplies Order for DME - ONLY FOR DME  Discussed the causes and treatment of lateral epicondylitis including ice, heat, stretching, massage, NSAIDs, elbow straps, and wrist brace use.  Rest as possible.  Steroid injection if not improving.    Right foot pain  -     XR Foot Right G/E 3 Views; Future  -     Orthopedic  Referral; Future  Will follow up and/or notify patient of  results via My Chart to determine further need for followup      Hypokalemia  -     : potassium chloride ER (KLOR-CON M) 20 MEQ CR tablet; Take 1 tablet (20 mEq) by mouth daily    Recurrent herpes simplex  -     valACYclovir (VALTREX) 500 MG tablet; Take 1 tablet (500 mg) by mouth daily    DDD (degenerative disc disease), lumbar  -     traMADol (ULTRAM) 50 MG tablet; Take 1 tablet (50 mg) by mouth every 6 hours as needed for severe pain  Will try tramadol instead of vicodin for intermittent lumbar back pain   Has been to specialist and seems surgery is her next option but really does not want to do this know     PLAN:   Patient needs to follow up in if no improvement,or sooner if worsening of symptoms or other symptoms develop.  FURTHER TESTING:       - pelvic  ultrasound       - mammogram  I will place order. Please call [phone] to schedule.  CONSULTATION/REFERRAL to Gynecology and Audiology   Please call [phone] to make appointment  if you do not hear from referrals in the next few days.   FUTURE LABS:       - Schedule a fasting blood draw   Work on weight loss  Regular exercise  Will follow up and/or notify patient of  results via My Chart to determine further need for followup  Follow up office visit in one year for annual health maintenance exam, sooner PRN.    Patient has been advised of split billing requirements and indicates understanding: Yes    COUNSELING:  Reviewed preventive health counseling, as reflected in patient instructions  Special attention given to:      "   Regular exercise       Healthy diet/nutrition       Vision screening       Hearing screening       Osteoporosis prevention/bone health       Colorectal Cancer Screening       Consider Hep C screening for all patients one time for ages 18-79 years       The 10-year ASCVD risk score (Rona CRANDALL JrRosales, et al., 2013) is: 1.1%    Values used to calculate the score:      Age: 52 years      Sex: Female      Is Non- : No      Diabetic: No      Tobacco smoker: No      Systolic Blood Pressure: 138 mmHg      Is BP treated: No      HDL Cholesterol: 97 mg/dL      Total Cholesterol: 236 mg/dL       Advance Care Planning    Estimated body mass index is 35.22 kg/m  as calculated from the following:    Height as of this encounter: 1.638 m (5' 4.5\").    Weight as of this encounter: 94.5 kg (208 lb 6.4 oz).    Weight management plan: Discussed healthy diet and exercise guidelines    She reports that she has never smoked. She has never used smokeless tobacco.      Counseling Resources:  ATP IV Guidelines  Pooled Cohorts Equation Calculator  Breast Cancer Risk Calculator  BRCA-Related Cancer Risk Assessment: FHS-7 Tool  FRAX Risk Assessment  ICSI Preventive Guidelines  Dietary Guidelines for Americans, 2010  USDA's MyPlate  ASA Prophylaxis  Lung CA Screening    Jaz Torres, SHAHRAM CNP  [facility]  "

## 2022-03-12 NOTE — RESULT ENCOUNTER NOTE
Gilbert Maher,    Attached are your test results.  Xray no significant difference form 2017   Mild bunion   Would recommend podiatry     Please call [phone] to make appointment  if you do not hear from referrals in the next few days.      Please contact us if you have any questions.    Jaz Torres, CNP

## 2022-03-15 ENCOUNTER — LAB (OUTPATIENT)
Dept: LAB | Facility: CLINIC | Age: 52
End: 2022-03-15

## 2022-03-15 ENCOUNTER — OFFICE VISIT (OUTPATIENT)
Dept: AUDIOLOGY | Facility: CLINIC | Age: 52
End: 2022-03-15
Payer: COMMERCIAL

## 2022-03-15 DIAGNOSIS — H90.3 SENSORINEURAL HEARING LOSS (SNHL) OF BOTH EARS: Primary | ICD-10-CM

## 2022-03-15 DIAGNOSIS — Z13.0 SCREENING FOR DISORDER OF BLOOD AND BLOOD-FORMING ORGANS: ICD-10-CM

## 2022-03-15 DIAGNOSIS — H91.90 DECREASED HEARING, UNSPECIFIED LATERALITY: ICD-10-CM

## 2022-03-15 DIAGNOSIS — Z98.84 BARIATRIC SURGERY STATUS: ICD-10-CM

## 2022-03-15 DIAGNOSIS — I10 HTN, GOAL BELOW 140/90: ICD-10-CM

## 2022-03-15 DIAGNOSIS — Z11.59 NEED FOR HEPATITIS C SCREENING TEST: ICD-10-CM

## 2022-03-15 DIAGNOSIS — Z13.29 SCREENING FOR THYROID DISORDER: ICD-10-CM

## 2022-03-15 DIAGNOSIS — Z13.6 CARDIOVASCULAR SCREENING; LDL GOAL LESS THAN 130: ICD-10-CM

## 2022-03-15 DIAGNOSIS — Z13.1 SCREENING FOR DIABETES MELLITUS (DM): ICD-10-CM

## 2022-03-15 LAB
ALBUMIN SERPL-MCNC: 3.7 G/DL (ref 3.4–5)
ALP SERPL-CCNC: 67 U/L (ref 40–150)
ALT SERPL W P-5'-P-CCNC: 23 U/L (ref 0–50)
ANION GAP SERPL CALCULATED.3IONS-SCNC: 8 MMOL/L (ref 3–14)
AST SERPL W P-5'-P-CCNC: 15 U/L (ref 0–45)
BILIRUB SERPL-MCNC: 0.7 MG/DL (ref 0.2–1.3)
BKR LAB AP GYN ADEQUACY: NORMAL
BKR LAB AP GYN INTERPRETATION: NORMAL
BKR LAB AP HPV REFLEX: NORMAL
BKR LAB AP PREVIOUS ABNORMAL: NORMAL
BUN SERPL-MCNC: 20 MG/DL (ref 7–30)
CALCIUM SERPL-MCNC: 9.1 MG/DL (ref 8.5–10.1)
CHLORIDE BLD-SCNC: 103 MMOL/L (ref 94–109)
CHOLEST SERPL-MCNC: 236 MG/DL
CO2 SERPL-SCNC: 28 MMOL/L (ref 20–32)
CREAT SERPL-MCNC: 0.87 MG/DL (ref 0.52–1.04)
CREAT UR-MCNC: 204 MG/DL
DEPRECATED CALCIDIOL+CALCIFEROL SERPL-MC: 42 UG/L (ref 20–75)
ERYTHROCYTE [DISTWIDTH] IN BLOOD BY AUTOMATED COUNT: 12.2 % (ref 10–15)
FASTING STATUS PATIENT QL REPORTED: YES
FERRITIN SERPL-MCNC: 24 NG/ML (ref 8–252)
GFR SERPL CREATININE-BSD FRML MDRD: 80 ML/MIN/1.73M2
GLUCOSE BLD-MCNC: 102 MG/DL (ref 70–99)
HCT VFR BLD AUTO: 43.1 % (ref 35–47)
HCV AB SERPL QL IA: NONREACTIVE
HDLC SERPL-MCNC: 97 MG/DL
HGB BLD-MCNC: 14.2 G/DL (ref 11.7–15.7)
LDLC SERPL CALC-MCNC: 116 MG/DL
MCH RBC QN AUTO: 29.5 PG (ref 26.5–33)
MCHC RBC AUTO-ENTMCNC: 32.9 G/DL (ref 31.5–36.5)
MCV RBC AUTO: 90 FL (ref 78–100)
MICROALBUMIN UR-MCNC: 26 MG/L
MICROALBUMIN/CREAT UR: 12.75 MG/G CR (ref 0–25)
NONHDLC SERPL-MCNC: 139 MG/DL
PATH REPORT.COMMENTS IMP SPEC: NORMAL
PATH REPORT.COMMENTS IMP SPEC: NORMAL
PATH REPORT.RELEVANT HX SPEC: NORMAL
PLATELET # BLD AUTO: 293 10E3/UL (ref 150–450)
POTASSIUM BLD-SCNC: 3.3 MMOL/L (ref 3.4–5.3)
PROT SERPL-MCNC: 6.8 G/DL (ref 6.8–8.8)
RBC # BLD AUTO: 4.81 10E6/UL (ref 3.8–5.2)
SODIUM SERPL-SCNC: 139 MMOL/L (ref 133–144)
TRIGL SERPL-MCNC: 116 MG/DL
TSH SERPL DL<=0.005 MIU/L-ACNC: 1.98 MU/L (ref 0.4–4)
VIT B12 SERPL-MCNC: 1014 PG/ML (ref 193–986)
WBC # BLD AUTO: 7.4 10E3/UL (ref 4–11)

## 2022-03-15 PROCEDURE — 99207 PR NO CHARGE LOS: CPT | Performed by: AUDIOLOGIST

## 2022-03-15 PROCEDURE — 80053 COMPREHEN METABOLIC PANEL: CPT

## 2022-03-15 PROCEDURE — 80061 LIPID PANEL: CPT

## 2022-03-15 PROCEDURE — 86803 HEPATITIS C AB TEST: CPT

## 2022-03-15 PROCEDURE — 36415 COLL VENOUS BLD VENIPUNCTURE: CPT

## 2022-03-15 PROCEDURE — 84443 ASSAY THYROID STIM HORMONE: CPT

## 2022-03-15 PROCEDURE — 82728 ASSAY OF FERRITIN: CPT

## 2022-03-15 PROCEDURE — 82607 VITAMIN B-12: CPT

## 2022-03-15 PROCEDURE — 92550 TYMPANOMETRY & REFLEX THRESH: CPT | Performed by: AUDIOLOGIST

## 2022-03-15 PROCEDURE — 82043 UR ALBUMIN QUANTITATIVE: CPT

## 2022-03-15 PROCEDURE — 85027 COMPLETE CBC AUTOMATED: CPT

## 2022-03-15 PROCEDURE — 82306 VITAMIN D 25 HYDROXY: CPT

## 2022-03-15 PROCEDURE — 92557 COMPREHENSIVE HEARING TEST: CPT | Performed by: AUDIOLOGIST

## 2022-03-15 NOTE — PROGRESS NOTES
AUDIOLOGY REPORT    SUBJECTIVE:  Pita Maher is a 52 year old female who was seen in the Audiology Clinic at the [facility] for audiologic evaluation, referred by Jaz Torres C.N.P. .The patient has been seen previously on 11/17/2011 for assessment and results indicated a mild high frequency sensorineural hearing loss bilaterally. The patient reports increased difficulty with speech understanding, especially in noise. The patient denies otalgia, aural fullness, otorrhea, tinnitus, and dizziness.  The patient notes difficulty with communication in a variety of listening situations.     OBJECTIVE:  Abuse Screening:  Do you feel unsafe at home or work/school? No  Do you feel threatened by someone? No  Does anyone try to keep you from having contact with others, or doing things outside of your home? No  Physical signs of abuse present? No     Fall Risk Screen:  1. Have you fallen two or more times in the past year? No  2. Have you fallen and had an injury in the past year? No    Timed Up and Go Score (in seconds): not tested  Is patient a fall risk? No  Referral initiated: No  Fall Risk Assessment Completed by Audiology    Otoscopic exam indicates ears are clear of cerumen bilaterally     Pure Tone Thresholds assessed using conventional audiometry with good  reliability from 250-8000 Hz bilaterally using insert and circumaural earphones     RIGHT:  normal sloping to moderate sensorineural hearing loss    LEFT:    normal sloping to moderate sensorineural hearing loss    Tympanogram:    RIGHT: normal eardrum mobility    LEFT:   normal eardrum mobility    Reflexes (reported by stimulus ear):  RIGHT: Ipsilateral is present at normal levels  RIGHT: Contralateral is present at normal levels  LEFT:   Ipsilateral is present at normal levels  LEFT:   Contralateral is present at normal levels      Speech Reception Threshold:    RIGHT: 10 dB HL    LEFT:   15 dB HL  Word Recognition Score:      RIGHT: 100% at 55 dB HL using NU-6 recorded word list.    LEFT:   100% at 55 dB HL using NU-6 recorded word list.      ASSESSMENT:     ICD-10-CM    1. Sensorineural hearing loss (SNHL) of both ears  H90.3 [facility] Eval & Speech Recog ([id number])     Tymps / Reflex   ([id number])   2. Decreased hearing, unspecified laterality  H91.90 Adult Audiology Referral       Compared to patient's previous audiogram dated 11/17/2011, hearing has declined slightly at several frequencies bilaterally. Today s results were discussed with the patient in detail.     PLAN:  Patient was counseled regarding hearing loss and impact on communication.  Handout on good communication strategies, and hearing aid use was given to patient. It is recommended that the patient be retested in approximately 1 year or sooner if new concerns arise.  Please call this clinic with questions regarding these results or recommendations.        Bj Heath.  Doctor of Audiology  [place] License # [id number]

## 2022-03-16 DIAGNOSIS — E87.6 HYPOKALEMIA: ICD-10-CM

## 2022-03-16 LAB
HUMAN PAPILLOMA VIRUS 16 DNA: NEGATIVE
HUMAN PAPILLOMA VIRUS 18 DNA: NEGATIVE
HUMAN PAPILLOMA VIRUS FINAL DIAGNOSIS: ABNORMAL
HUMAN PAPILLOMA VIRUS OTHER HR: POSITIVE

## 2022-03-16 RX ORDER — POTASSIUM CHLORIDE 1125 MG/1
30 TABLET, EXTENDED RELEASE ORAL DAILY
Qty: 180 TABLET | Refills: 3 | Status: SHIPPED | OUTPATIENT
Start: 2022-03-16 | End: 2023-03-07

## 2022-03-16 NOTE — RESULT ENCOUNTER NOTE
Gilbert Maher,    Attached are your test results.  -Normal red blood cell (hgb) levels, normal white blood cell count and normal platelet levels.  -LDL(bad) cholesterol level is elevated which can increase your heart disease risk.  A diet high in fat and simple carbohydrates, genetics and being overweight can contribute to this. ADVISE: exercising 150 minutes of aerobic exercise per week (30 minutes for 5 days per week or 50 minutes for 3 days per week are options) and eating a low saturated fat/low carbohydrate diet are helpful to improve this. In 12 months, you should recheck your fasting cholesterol panel by scheduling a lab-only appointment.  -Liver and gallbladder tests (ALT,AST, Alk phos,bilirubin) are normal.  -Kidney function (GFR) is normal.  -Sodium is normal.  -Potassium is decreased.  ADVISE: rechecking this in 1 month. Am increasing your potassium to 30 meq a day   -Calcium is normal.  -Glucose is slight elevated and may be a sign of early diabetes (prediabetes). ADVISE:: eating a low carbohydrate diet, exercising, trying to lose weight (if necessary) and rechecking your glucose level in 12 months.  -TSH (thyroid stimulating hormone) level is normal which indicates normal thyroid function.  -Hepatitis C antibody screen test shows no signs of a previous hepatitis C infection.  -Vitamin D level is normal and getting 1000 IU daily in your diet or supplements is recommended.   B12 still very elevated. Not sure how much you are supplementing with   -Ferritin (iron) level is normal.  -Microalbumin (urine protein) test is normal.  ADVISE: rechecking this annually.     Please contact us if you have any questions.    Jaz Torres, CNP

## 2022-03-17 ENCOUNTER — PATIENT OUTREACH (OUTPATIENT)
Dept: FAMILY MEDICINE | Facility: CLINIC | Age: 52
End: 2022-03-17
Payer: COMMERCIAL

## 2022-03-17 DIAGNOSIS — R87.810 CERVICAL HIGH RISK HPV (HUMAN PAPILLOMAVIRUS) TEST POSITIVE: ICD-10-CM

## 2022-03-17 DIAGNOSIS — Z13.6 CARDIOVASCULAR SCREENING; LDL GOAL LESS THAN 130: ICD-10-CM

## 2022-03-21 ENCOUNTER — TELEPHONE (OUTPATIENT)
Dept: FAMILY MEDICINE | Facility: CLINIC | Age: 52
End: 2022-03-21
Payer: COMMERCIAL

## 2022-03-21 NOTE — TELEPHONE ENCOUNTER
Completed [employer] forms faxed to [phone] and [facility] for [facility]

## 2022-04-13 NOTE — TELEPHONE ENCOUNTER
DIAGNOSIS: Right foot pain   APPOINTMENT DATE: 05/03/2022   NOTES STATUS DETAILS   OFFICE NOTE from referring provider Internal 03/11/2022 Jaz Torres [facility]    OFFICE NOTE from other specialist N/A    DISCHARGE SUMMARY from hospital N/A    DISCHARGE REPORT from the ER N/A    OPERATIVE REPORT N/A    EMG report N/A    MEDICATION LIST N/A    MRI N/A    DEXA (osteoporosis/bone health) N/A    CT SCAN N/A    XRAYS (IMAGES & REPORTS) Internal 03/11/2022 [facility]

## 2022-05-03 ENCOUNTER — PRE VISIT (OUTPATIENT)
Dept: PODIATRY | Facility: CLINIC | Age: 52
End: 2022-05-03
Payer: COMMERCIAL

## 2022-05-16 ENCOUNTER — OFFICE VISIT (OUTPATIENT)
Dept: PODIATRY | Facility: CLINIC | Age: 52
End: 2022-05-16
Attending: NURSE PRACTITIONER
Payer: COMMERCIAL

## 2022-05-16 DIAGNOSIS — M79.671 RIGHT FOOT PAIN: ICD-10-CM

## 2022-05-16 DIAGNOSIS — M76.71 PERONEAL TENDINITIS OF RIGHT LOWER EXTREMITY: Primary | ICD-10-CM

## 2022-05-16 LAB — URATE SERPL-MCNC: 4.1 MG/DL (ref 2.6–6)

## 2022-05-16 PROCEDURE — 84550 ASSAY OF BLOOD/URIC ACID: CPT | Performed by: PODIATRIST

## 2022-05-16 PROCEDURE — 99214 OFFICE O/P EST MOD 30 MIN: CPT | Performed by: PODIATRIST

## 2022-05-16 PROCEDURE — 36415 COLL VENOUS BLD VENIPUNCTURE: CPT | Performed by: PODIATRIST

## 2022-05-16 NOTE — LETTER
5/16/2022         RE: Pita Maher  [address]  [place] [address]        Dear Colleague,    Thank you for referring your patient, Pita Maher, to the [facility]. Please see a copy of my visit note below.    Past Medical History:   Diagnosis Date     Abnormal Pap smear     20 years ago and had coloposcopy and froze     Chronic headache      Chronic headache      DDD (degenerative disc disease), cervical 6/13/2014     DDD (degenerative disc disease), lumbar 11/25/2013     Depressive disorder     no longer treating for this as of April 2017     Depressive disorder, not elsewhere classified 1993    limited episodes in 1990s, no clinically significant depression since then     Headache(784.0) 1983     Herpes simplex without mention of complication      Hypertension Jan 2014     Lumbago 1988    ongoing problem since 18 years old     Migraine      Obesity, unspecified     always a larger child, went on to have Katie en Y 1999, with BMI > 40 at time of surgery     Ovarian cyst      Patient Active Problem List   Diagnosis     Insomnia     Bariatric surgery status     Chronic headache     Recurrent herpes simplex     CARDIOVASCULAR SCREENING; LDL GOAL LESS THAN 130     Obesity, Class I, BMI 30-34.9     Adjustment disorder with anxiety     Pyelonephritis     HTN, goal below 140/90     Rotator cuff disorder     S/P rotator cuff repair     Rotator cuff (capsule) sprain     Rotator cuff tear     DDD (degenerative disc disease), lumbar     DDD (degenerative disc disease), cervical     Abnormal TSH     Cervical high risk HPV (human papillomavirus) test positive     Past Surgical History:   Procedure Laterality Date     ARTHROSCOPIC RECONSTRUCTION ANTERIOR AND POSTERIOR CRUCIATE LIGAMENT, COMBINED       BACK SURGERY  2014    steroid injections     BACK SURGERY  2014    steroid injections     BIOPSY  1991 & 1992    left ear & left breast     BREAST SURGERY  1993    lump removal     BREAST  SURGERY  1992    lump removal     COLONOSCOPY  1/22/2013    Procedure: COLONOSCOPY;  colonoscopy, loose stools, blood in stool;  Surgeon: Kamari Solomon MD;  Location: [facility] OR     CRYOTHERAPY  20 years ago     DILATION AND CURETTAGE SUCTION  2/24/2012    Procedure:DILATION AND CURETTAGE SUCTION; DILATION, CURETTAGE AND SUCTION ; Surgeon:JOHN HRONER; Location:[facility]     ENT SURGERY  1974    tonsils removed     LAPAROSCOPIC BYPASS GASTRIC       lump removed from left ear[       SURGICAL HISTORY OF -   1989    lt knee acl reconstruction     SURGICAL HISTORY OF -   2001    lt breast lump removal     SURGICAL HISTORY OF -   1999    gastric bypass (Katie en Y) by Dr. Fontenot at [facility]     SURGICAL HISTORY OF -   1974    tonsillectomy     SURGICAL HISTORY OF -   2005    LASIK     Social History     Socioeconomic History     Marital status:      Spouse name: Paolo ( in 2007)     Number of children: o     Years of education: 16     Highest education level: Not on file   Occupational History     Occupation: insurance brokerage agent     Employer: [employer]   Tobacco Use     Smoking status: Never Smoker     Smokeless tobacco: Never Used     Tobacco comment: EX- SMOKED IN HOUSE FOR PAST 10 YEARS   Substance and Sexual Activity     Alcohol use: No     Alcohol/week: 0.0 standard drinks     Drug use: Never     Sexual activity: Yes     Partners: Male     Birth control/protection: Male Surgical   Other Topics Concern      Service Not Asked     Blood Transfusions No     Caffeine Concern Not Asked     Occupational Exposure Not Asked     Hobby Hazards Not Asked     Sleep Concern Yes     Stress Concern Yes     Weight Concern Yes     Special Diet No     Back Care Yes     Comment: LOW BACK PAIN, UPPER BACK PAIN WITH NUMBNESS IN HANDS     Exercise Yes     Comment: 2 X WEEKLY     Bike Helmet Not Asked     Seat Belt Yes     Self-Exams Yes     Parent/sibling w/ CABG, MI or angioplasty before  65F 55M? Yes     Comment: Father   Social History Narrative     Not on file     Social Determinants of Health     Financial Resource Strain: Not on file   Food Insecurity: Not on file   Transportation Needs: Not on file   Physical Activity: Not on file   Stress: Not on file   Social Connections: Not on file   Intimate Partner Violence: Not on file   Housing Stability: Not on file     Family History   Problem Relation Age of Onset     Lipids Mother      Hypertension Mother      Anesthesia Reaction Mother      Hyperlipidemia Mother      Other Cancer Mother         Skin cancer     C.A.D. Father         first MI at 40 years old (was a smoker)     Lipids Father      Heart Disease Father      Hypertension Father      C.A.D. Maternal Grandmother         not premature presentation     Cancer Maternal Grandmother         skin     Other Cancer Maternal Grandmother         Skin cancer     C.A.D. Paternal Grandmother         not premature presentation     Diabetes Paternal Grandmother      Hypertension Paternal Grandmother      Depression Paternal Grandmother      Anxiety Disorder Brother      Mental Illness Other      Depression Other      Other Cancer Maternal Half-Sister         Skin cancer     Breast Cancer No family hx of      Cancer - colorectal No family hx of      Uric Acid   Date Value Ref Range Status   05/16/2022 4.1 2.6 - 6.0 mg/dL Final   01/10/2012 1.3 (A) 2.5 - 7.1 mg/dl        Uric acid ordered to help rule out Gout and use discussed with her.    SUBJECTIVE FINDINGS:  A 52-year-old presents for right foot pain from Jaz Torres.  She relates it has been going on for 7+ months.  No specific injury.  She relates if she steps down on it, it hurts.  Sometimes it will get kind of warm and just painful.  It makes it difficult for her to walk at times.    OBJECTIVE FINDINGS:  DP and PT are 2/4, right.  She has some edema on the right lateral foot and ankle area.  There is no erythema, no odor, no calor, no drainage.   There is no pain on palpation but she relates it hurts at the 5th metatarsal base and proximal along the peroneal tendons when it occurs.      X-rays from 03/11/2022 reviewed with the patient in clinic today.  Joint and cortical margins are intact.  There are no fractures.  These are compared to 05/25/2017 x-rays with no gross changes.  She does have some 1st MPJ irregularity at the 1st MPJ which has a similar appearance to what gout would look like.      ASSESSMENT AND PLAN:  Right foot pain.  Peroneal tendinopathy, right.  Diagnosis and treatment options discussed with her.  MRI of the foot and ankle ordered and use discussed with her.  Ankle brace dispensed and use discussed with her.  She relates she has Voltaren gel.  I advised her she can use that on the ankle.  She will return to clinic and see us in 1 week.  Previous notes reviewed.              Moderate level of medical decision making.      Again, thank you for allowing me to participate in the care of your patient.        Sincerely,        Maynor López DPM

## 2022-05-16 NOTE — PROGRESS NOTES
Past Medical History:   Diagnosis Date     Abnormal Pap smear     20 years ago and had coloposcopy and froze     Chronic headache      Chronic headache      DDD (degenerative disc disease), cervical 6/13/2014     DDD (degenerative disc disease), lumbar 11/25/2013     Depressive disorder     no longer treating for this as of April 2017     Depressive disorder, not elsewhere classified 1993    limited episodes in 1990s, no clinically significant depression since then     Headache(784.0) 1983     Herpes simplex without mention of complication      Hypertension Jan 2014     Lumbago 1988    ongoing problem since 18 years old     Migraine      Obesity, unspecified     always a larger child, went on to have Katie en Y 1999, with BMI > 40 at time of surgery     Ovarian cyst      Patient Active Problem List   Diagnosis     Insomnia     Bariatric surgery status     Chronic headache     Recurrent herpes simplex     CARDIOVASCULAR SCREENING; LDL GOAL LESS THAN 130     Obesity, Class I, BMI 30-34.9     Adjustment disorder with anxiety     Pyelonephritis     HTN, goal below 140/90     Rotator cuff disorder     S/P rotator cuff repair     Rotator cuff (capsule) sprain     Rotator cuff tear     DDD (degenerative disc disease), lumbar     DDD (degenerative disc disease), cervical     Abnormal TSH     Cervical high risk HPV (human papillomavirus) test positive     Past Surgical History:   Procedure Laterality Date     ARTHROSCOPIC RECONSTRUCTION ANTERIOR AND POSTERIOR CRUCIATE LIGAMENT, COMBINED       BACK SURGERY  2014    steroid injections     BACK SURGERY  2014    steroid injections     BIOPSY  1991 & 1992    left ear & left breast     BREAST SURGERY  1993    lump removal     BREAST SURGERY  1992    lump removal     COLONOSCOPY  1/22/2013    Procedure: COLONOSCOPY;  colonoscopy, loose stools, blood in stool;  Surgeon: Kamari Solomon MD;  Location: [facility] OR     CRYOTHERAPY  20 years ago     DILATION AND CURETTAGE SUCTION  2/24/2012     Procedure:DILATION AND CURETTAGE SUCTION; DILATION, CURETTAGE AND SUCTION ; Surgeon:JOHN HORNER; Location:[facility]     ENT SURGERY  1974    tonsils removed     LAPAROSCOPIC BYPASS GASTRIC       lump removed from left ear[       SURGICAL HISTORY OF -   1989    lt knee acl reconstruction     SURGICAL HISTORY OF -   2001    lt breast lump removal     SURGICAL HISTORY OF -   1999    gastric bypass (Katie en Y) by Dr. Fontenot at [facility]     SURGICAL HISTORY OF -   1974    tonsillectomy     SURGICAL HISTORY OF -   2005    LASIK     Social History     Socioeconomic History     Marital status:      Spouse name: Paolo ( in 2007)     Number of children: o     Years of education: 16     Highest education level: Not on file   Occupational History     Occupation: insurance brokerage agent     Employer: [employer]   Tobacco Use     Smoking status: Never Smoker     Smokeless tobacco: Never Used     Tobacco comment: EX- SMOKED IN HOUSE FOR PAST 10 YEARS   Substance and Sexual Activity     Alcohol use: No     Alcohol/week: 0.0 standard drinks     Drug use: Never     Sexual activity: Yes     Partners: Male     Birth control/protection: Male Surgical   Other Topics Concern      Service Not Asked     Blood Transfusions No     Caffeine Concern Not Asked     Occupational Exposure Not Asked     Hobby Hazards Not Asked     Sleep Concern Yes     Stress Concern Yes     Weight Concern Yes     Special Diet No     Back Care Yes     Comment: LOW BACK PAIN, UPPER BACK PAIN WITH NUMBNESS IN HANDS     Exercise Yes     Comment: 2 X WEEKLY     Bike Helmet Not Asked     Seat Belt Yes     Self-Exams Yes     Parent/sibling w/ CABG, MI or angioplasty before 65F 55M? Yes     Comment: Father   Social History Narrative     Not on file     Social Determinants of Health     Financial Resource Strain: Not on file   Food Insecurity: Not on file   Transportation Needs: Not on file   Physical Activity: Not on file    Stress: Not on file   Social Connections: Not on file   Intimate Partner Violence: Not on file   Housing Stability: Not on file     Family History   Problem Relation Age of Onset     Lipids Mother      Hypertension Mother      Anesthesia Reaction Mother      Hyperlipidemia Mother      Other Cancer Mother         Skin cancer     C.A.D. Father         first MI at 40 years old (was a smoker)     Lipids Father      Heart Disease Father      Hypertension Father      C.A.D. Maternal Grandmother         not premature presentation     Cancer Maternal Grandmother         skin     Other Cancer Maternal Grandmother         Skin cancer     C.A.D. Paternal Grandmother         not premature presentation     Diabetes Paternal Grandmother      Hypertension Paternal Grandmother      Depression Paternal Grandmother      Anxiety Disorder Brother      Mental Illness Other      Depression Other      Other Cancer Maternal Half-Sister         Skin cancer     Breast Cancer No family hx of      Cancer - colorectal No family hx of      Uric Acid   Date Value Ref Range Status   05/16/2022 4.1 2.6 - 6.0 mg/dL Final   01/10/2012 1.3 (A) 2.5 - 7.1 mg/dl        Uric acid ordered to help rule out Gout and use discussed with her.    SUBJECTIVE FINDINGS:  A 52-year-old presents for right foot pain from Jaz Torres.  She relates it has been going on for 7+ months.  No specific injury.  She relates if she steps down on it, it hurts.  Sometimes it will get kind of warm and just painful.  It makes it difficult for her to walk at times.    OBJECTIVE FINDINGS:  DP and PT are 2/4, right.  She has some edema on the right lateral foot and ankle area.  There is no erythema, no odor, no calor, no drainage.  There is no pain on palpation but she relates it hurts at the 5th metatarsal base and proximal along the peroneal tendons when it occurs.      X-rays from 03/11/2022 reviewed with the patient in clinic today.  Joint and cortical margins are intact.   There are no fractures.  These are compared to 05/25/2017 x-rays with no gross changes.  She does have some 1st MPJ irregularity at the 1st MPJ which has a similar appearance to what gout would look like.      ASSESSMENT AND PLAN:  Right foot pain.  Peroneal tendinopathy, right.  Diagnosis and treatment options discussed with her.  MRI of the foot and ankle ordered and use discussed with her.  Ankle brace dispensed and use discussed with her.  She relates she has Voltaren gel.  I advised her she can use that on the ankle.  She will return to clinic and see us in 1 week.  Previous notes reviewed.    5/25/22- Called patient and reviewed mri results, diagnoses and treatment with her.  She relates the ankle brace helps but has not resolved the pain.  She can get cam boot tomorrow at clinic.  Return to clinic in 2 weeks.          Moderate level of medical decision making.

## 2022-05-19 ENCOUNTER — ANCILLARY PROCEDURE (OUTPATIENT)
Dept: ULTRASOUND IMAGING | Facility: CLINIC | Age: 52
End: 2022-05-19
Attending: NURSE PRACTITIONER
Payer: COMMERCIAL

## 2022-05-19 DIAGNOSIS — N93.8 DUB (DYSFUNCTIONAL UTERINE BLEEDING): ICD-10-CM

## 2022-05-19 PROCEDURE — 76830 TRANSVAGINAL US NON-OB: CPT

## 2022-05-19 PROCEDURE — 76856 US EXAM PELVIC COMPLETE: CPT

## 2022-05-20 NOTE — RESULT ENCOUNTER NOTE
Gilbert Maher,    Attached are your test results.  Pelvic ultrasound is normal except for a cyst on the left   Please make appointment with GYN as we discussed    Please contact us if you have any questions.    Jaz Torres, CNP

## 2022-05-24 ENCOUNTER — OFFICE VISIT (OUTPATIENT)
Dept: OBGYN | Facility: CLINIC | Age: 52
End: 2022-05-24
Attending: NURSE PRACTITIONER
Payer: COMMERCIAL

## 2022-05-24 VITALS
HEART RATE: 80 BPM | SYSTOLIC BLOOD PRESSURE: 127 MMHG | BODY MASS INDEX: 34.4 KG/M2 | WEIGHT: 203.56 LBS | DIASTOLIC BLOOD PRESSURE: 81 MMHG

## 2022-05-24 DIAGNOSIS — N93.8 DUB (DYSFUNCTIONAL UTERINE BLEEDING): ICD-10-CM

## 2022-05-24 PROCEDURE — 99203 OFFICE O/P NEW LOW 30 MIN: CPT | Performed by: ADVANCED PRACTICE MIDWIFE

## 2022-05-24 NOTE — PROGRESS NOTES
Pita Maher is a 52 year old who presents to the clinic for evaluation of DUB.   For the last several years has had irregular, heavy menses that occasionally last for as long as 10-15 days and cause her to feel unwell.         Histories reviewed and updated  Past Medical History:   Diagnosis Date     Abnormal Pap smear     20 years ago and had coloposcopy and froze     Chronic headache      Chronic headache      DDD (degenerative disc disease), cervical 6/13/2014     DDD (degenerative disc disease), lumbar 11/25/2013     Depressive disorder     no longer treating for this as of April 2017     Depressive disorder, not elsewhere classified 1993    limited episodes in 1990s, no clinically significant depression since then     Headache(784.0) 1983     Herpes simplex without mention of complication      Hypertension Jan 2014     Lumbago 1988    ongoing problem since 18 years old     Migraine      Obesity, unspecified     always a larger child, went on to have Katie en Y 1999, with BMI > 40 at time of surgery     Ovarian cyst      Past Surgical History:   Procedure Laterality Date     ARTHROSCOPIC RECONSTRUCTION ANTERIOR AND POSTERIOR CRUCIATE LIGAMENT, COMBINED       BACK SURGERY  2014    steroid injections     BACK SURGERY  2014    steroid injections     BIOPSY  1991 & 1992    left ear & left breast     BREAST SURGERY  1993    lump removal     BREAST SURGERY  1992    lump removal     COLONOSCOPY  1/22/2013    Procedure: COLONOSCOPY;  colonoscopy, loose stools, blood in stool;  Surgeon: Kamari Solomon MD;  Location: [facility]     CRYOTHERAPY  20 years ago     DILATION AND CURETTAGE SUCTION  2/24/2012    Procedure:DILATION AND CURETTAGE SUCTION; DILATION, CURETTAGE AND SUCTION ; Surgeon:JOHN HORNER; Location:[facility]     ENT SURGERY  1974    tonsils removed     LAPAROSCOPIC BYPASS GASTRIC       lump removed from left ear[       SURGICAL HISTORY OF -   1989    lt knee acl reconstruction     SURGICAL HISTORY  OF -   2001    lt breast lump removal     SURGICAL HISTORY OF -   1999    gastric bypass (Katie en Y) by Dr. Fontenot at [facility]     SURGICAL HISTORY OF -   1974    tonsillectomy     SURGICAL HISTORY OF -   2005    LASIK     Social History     Socioeconomic History     Marital status:      Spouse name: Paolo ( in 2007)     Number of children: o     Years of education: 16     Highest education level: Not on file   Occupational History     Occupation: insurance brokerage agent     Employer: [employer]   Tobacco Use     Smoking status: Never Smoker     Smokeless tobacco: Never Used     Tobacco comment: EX- SMOKED IN HOUSE FOR PAST 10 YEARS   Substance and Sexual Activity     Alcohol use: No     Alcohol/week: 0.0 standard drinks     Drug use: Never     Sexual activity: Yes     Partners: Male     Birth control/protection: None   Other Topics Concern      Service Not Asked     Blood Transfusions No     Caffeine Concern Not Asked     Occupational Exposure Not Asked     Hobby Hazards Not Asked     Sleep Concern Yes     Stress Concern Yes     Weight Concern Yes     Special Diet No     Back Care Yes     Comment: LOW BACK PAIN, UPPER BACK PAIN WITH NUMBNESS IN HANDS     Exercise Yes     Comment: 2 X WEEKLY     Bike Helmet Not Asked     Seat Belt Yes     Self-Exams Yes     Parent/sibling w/ CABG, MI or angioplasty before 65F 55M? Yes     Comment: Father   Social History Narrative     Not on file     Social Determinants of Health     Financial Resource Strain: Not on file   Food Insecurity: Not on file   Transportation Needs: Not on file   Physical Activity: Not on file   Stress: Not on file   Social Connections: Not on file   Intimate Partner Violence: Not on file   Housing Stability: Not on file     Family History   Problem Relation Age of Onset     Lipids Mother      Hypertension Mother      Anesthesia Reaction Mother      Hyperlipidemia Mother      Other Cancer Mother         Skin cancer      C.A.D. Father         first MI at 40 years old (was a smoker)     Lipids Father      Heart Disease Father      Hypertension Father      C.A.D. Maternal Grandmother         not premature presentation     Cancer Maternal Grandmother         skin     Other Cancer Maternal Grandmother         Skin cancer     C.A.D. Paternal Grandmother         not premature presentation     Diabetes Paternal Grandmother      Hypertension Paternal Grandmother      Depression Paternal Grandmother      Anxiety Disorder Brother      Mental Illness Other      Depression Other      Other Cancer Maternal Half-Sister         Skin cancer     Breast Cancer No family hx of      Cancer - colorectal No family hx of           ROS: 10 point ROS neg other than the symptoms noted above in the HPI.    EXAM:  /81 (BP Location: Right arm, Patient Position: Sitting, Cuff Size: Adult Large)   Pulse 80   Wt 92.3 kg (203 lb 9 oz)   LMP  (LMP Unknown)   BMI 34.40 kg/m          ASSESSMENT/PLAN:    (N93.8) DUB (dysfunctional uterine bleeding)  Comment:   Plan:     We discussed the most likely cause of her long, irregular and heavy menses, defined menopause and marianna menopause and discussed the options open to her.   I recommended and EMB to r/o endometrial pathology.   We discussed the mirena, ablation and a hyst.   She is most interested in the mirena.   The Mirena IUS was discussed at length and the topics covered included the procedure for insertion, timing of insertion and complications of insertion including rare uterine perforation, infection to include PID and pain/cramping during and after the procedure.   The probable mechanisms of action were covered, failure rates, spontaneous expulsion, the importance of checking the string monthly,  as well as minor or nuisance side effects such as ovarian cysts, migraines, skin changes irregular and unpredictable bleeding in the first 6 months of use and bleeding patterns after the first 6 months.  The  's printed material was provided for her review.  She will schedule and appointment in the near future for an EMB and IUD insertion  Labs were reviewed in Epic  and the results were normal.  Currently has no anemia or low serum ferritin    Imaging was reviewed in Epic. And the results were normal with the exception of a 5 cm benign appearing ovarian cyst that follow up is recommended for in 6 months.       30 minutes spent on the date of the encounter doing chart review, review of test results, patient visit and documentation

## 2022-05-25 ENCOUNTER — ANCILLARY PROCEDURE (OUTPATIENT)
Dept: MRI IMAGING | Facility: CLINIC | Age: 52
End: 2022-05-25
Attending: PODIATRIST
Payer: COMMERCIAL

## 2022-05-25 DIAGNOSIS — M76.71 PERONEAL TENDINITIS OF RIGHT LOWER EXTREMITY: ICD-10-CM

## 2022-05-25 DIAGNOSIS — M79.671 RIGHT FOOT PAIN: ICD-10-CM

## 2022-05-25 PROCEDURE — 73718 MRI LOWER EXTREMITY W/O DYE: CPT | Mod: RT | Performed by: RADIOLOGY

## 2022-05-25 PROCEDURE — 73721 MRI JNT OF LWR EXTRE W/O DYE: CPT | Mod: RT | Performed by: RADIOLOGY

## 2022-05-26 ENCOUNTER — ALLIED HEALTH/NURSE VISIT (OUTPATIENT)
Dept: NURSING | Facility: CLINIC | Age: 52
End: 2022-05-26
Payer: COMMERCIAL

## 2022-05-26 NOTE — NURSING NOTE
DME FITTING      Relevant Diagnosis: Right foot pain  Walking boot was fit on patient's Right foot    Person(s) involved in teaching:   Patient    Brace was applied in standard Manner:  Yes  Brace fit well:  Yes  Patient reports brace to fit comfortably:  Yes    Education:   Patient shown self application and removal of brace: Yes  Patient shown how to adjust brace fit, if necessary: Yes  Patient educated on billing and return policy: Yes  Patient confirmed understanding when and how to contact clinic with concerns: Yes

## 2022-06-03 ENCOUNTER — OFFICE VISIT (OUTPATIENT)
Dept: PODIATRY | Facility: CLINIC | Age: 52
End: 2022-06-03
Payer: COMMERCIAL

## 2022-06-03 DIAGNOSIS — M79.671 RIGHT FOOT PAIN: Primary | ICD-10-CM

## 2022-06-03 DIAGNOSIS — M76.71 PERONEAL TENDINITIS OF RIGHT LOWER EXTREMITY: ICD-10-CM

## 2022-06-03 PROCEDURE — 99213 OFFICE O/P EST LOW 20 MIN: CPT | Performed by: PODIATRIST

## 2022-06-03 NOTE — PROGRESS NOTES
Past Medical History:   Diagnosis Date     Abnormal Pap smear     20 years ago and had coloposcopy and froze     Chronic headache      Chronic headache      DDD (degenerative disc disease), cervical 6/13/2014     DDD (degenerative disc disease), lumbar 11/25/2013     Depressive disorder     no longer treating for this as of April 2017     Depressive disorder, not elsewhere classified 1993    limited episodes in 1990s, no clinically significant depression since then     Headache(784.0) 1983     Herpes simplex without mention of complication      Hypertension Jan 2014     Lumbago 1988    ongoing problem since 18 years old     Migraine      Obesity, unspecified     always a larger child, went on to have Katie en Y 1999, with BMI > 40 at time of surgery     Ovarian cyst      Patient Active Problem List   Diagnosis     Insomnia     Bariatric surgery status     Chronic headache     Recurrent herpes simplex     CARDIOVASCULAR SCREENING; LDL GOAL LESS THAN 130     Obesity, Class I, BMI 30-34.9     Adjustment disorder with anxiety     Pyelonephritis     HTN, goal below 140/90     Rotator cuff disorder     S/P rotator cuff repair     Rotator cuff (capsule) sprain     Rotator cuff tear     DDD (degenerative disc disease), lumbar     DDD (degenerative disc disease), cervical     Abnormal TSH     Cervical high risk HPV (human papillomavirus) test positive     Past Surgical History:   Procedure Laterality Date     ARTHROSCOPIC RECONSTRUCTION ANTERIOR AND POSTERIOR CRUCIATE LIGAMENT, COMBINED       BACK SURGERY  2014    steroid injections     BACK SURGERY  2014    steroid injections     BIOPSY  1991 & 1992    left ear & left breast     BREAST SURGERY  1993    lump removal     BREAST SURGERY  1992    lump removal     COLONOSCOPY  1/22/2013    Procedure: COLONOSCOPY;  colonoscopy, loose stools, blood in stool;  Surgeon: Kamari Solomon MD;  Location: [facility]     CRYOTHERAPY  20 years ago     DILATION AND CURETTAGE SUCTION  2/24/2012     Procedure:DILATION AND CURETTAGE SUCTION; DILATION, CURETTAGE AND SUCTION ; Surgeon:JOHN HORNER; Location:[facility]     ENT SURGERY  1974    tonsils removed     LAPAROSCOPIC BYPASS GASTRIC       lump removed from left ear[       SURGICAL HISTORY OF -   1989    lt knee acl reconstruction     SURGICAL HISTORY OF -   2001    lt breast lump removal     SURGICAL HISTORY OF -   1999    gastric bypass (Katie en Y) by Dr. Fontenot at [facility]     SURGICAL HISTORY OF -   1974    tonsillectomy     SURGICAL HISTORY OF -   2005    LASIK     Social History     Socioeconomic History     Marital status:      Spouse name: Paolo ( in 2007)     Number of children: o     Years of education: 16     Highest education level: Not on file   Occupational History     Occupation: insurance brokerage agent     Employer: [employer]   Tobacco Use     Smoking status: Never Smoker     Smokeless tobacco: Never Used     Tobacco comment: EX- SMOKED IN HOUSE FOR PAST 10 YEARS   Substance and Sexual Activity     Alcohol use: No     Alcohol/week: 0.0 standard drinks     Drug use: Never     Sexual activity: Yes     Partners: Male     Birth control/protection: None   Other Topics Concern      Service Not Asked     Blood Transfusions No     Caffeine Concern Not Asked     Occupational Exposure Not Asked     Hobby Hazards Not Asked     Sleep Concern Yes     Stress Concern Yes     Weight Concern Yes     Special Diet No     Back Care Yes     Comment: LOW BACK PAIN, UPPER BACK PAIN WITH NUMBNESS IN HANDS     Exercise Yes     Comment: 2 X WEEKLY     Bike Helmet Not Asked     Seat Belt Yes     Self-Exams Yes     Parent/sibling w/ CABG, MI or angioplasty before 65F 55M? Yes     Comment: Father   Social History Narrative     Not on file     Social Determinants of Health     Financial Resource Strain: Not on file   Food Insecurity: Not on file   Transportation Needs: Not on file   Physical Activity: Not on file   Stress:  Not on file   Social Connections: Not on file   Intimate Partner Violence: Not on file   Housing Stability: Not on file     Family History   Problem Relation Age of Onset     Lipids Mother      Hypertension Mother      Anesthesia Reaction Mother      Hyperlipidemia Mother      Other Cancer Mother         Skin cancer     C.A.D. Father         first MI at 40 years old (was a smoker)     Lipids Father      Heart Disease Father      Hypertension Father      C.A.D. Maternal Grandmother         not premature presentation     Cancer Maternal Grandmother         skin     Other Cancer Maternal Grandmother         Skin cancer     C.A.D. Paternal Grandmother         not premature presentation     Diabetes Paternal Grandmother      Hypertension Paternal Grandmother      Depression Paternal Grandmother      Anxiety Disorder Brother      Mental Illness Other      Depression Other      Other Cancer Maternal Half-Sister         Skin cancer     Breast Cancer No family hx of      Cancer - colorectal No family hx of      Uric Acid   Date Value Ref Range Status   05/16/2022 4.1 2.6 - 6.0 mg/dL Final   01/10/2012 1.3 (A) 2.5 - 7.1 mg/dl      SUBJECTIVE FINDINGS:  A 52-year-old female returns to clinic for right foot pain and peroneal tendinopathy.  She relates this feels better in the boot.  The ankle brace helped a little bit, but the boot is much better.  She relates no problems wearing it.    OBJECTIVE FINDINGS:  Vascular status intact, right.  She has pain on palpation of the 4th-5th metatarsals and the 5th metatarsal base and peroneal tendons.      IMAGING:  MRIs were reviewed with the patient in clinic today.    ASSESSMENT AND PLAN:  Peroneal tendinopathy, right foot.  Stress reaction, right 4th-5th metatarsals.  Diagnosis and treatment options discussed with the patient.  I am going to have her continue to rest, elevate and wear the CAM boot.  She relates she really has not had excessive activity.  I did advise her on sitting  positions and trying to avoid stress on the lateral foot.  Return to clinic in see me in 2 weeks.  Previous notes reviewed.

## 2022-06-03 NOTE — NURSING NOTE
Pita Maher's chief complaint for this visit includes:  Chief Complaint   Patient presents with     Follow Up     Follow up with right foot     PCP: Jaz Torres    Referring Provider:  No referring provider defined for this encounter.    LMP  (LMP Unknown)   Data Unavailable        Allergies   Allergen Reactions     Lomotil          Do you need any medication refills at today's visit?

## 2022-06-03 NOTE — LETTER
6/3/2022         RE: Pita Maher  [address]  [place] [address]        Dear Colleague,    Thank you for referring your patient, Pita Maher, to the [facility]. Please see a copy of my visit note below.    Past Medical History:   Diagnosis Date     Abnormal Pap smear     20 years ago and had coloposcopy and froze     Chronic headache      Chronic headache      DDD (degenerative disc disease), cervical 6/13/2014     DDD (degenerative disc disease), lumbar 11/25/2013     Depressive disorder     no longer treating for this as of April 2017     Depressive disorder, not elsewhere classified 1993    limited episodes in 1990s, no clinically significant depression since then     Headache(784.0) 1983     Herpes simplex without mention of complication      Hypertension Jan 2014     Lumbago 1988    ongoing problem since 18 years old     Migraine      Obesity, unspecified     always a larger child, went on to have Katie en Y 1999, with BMI > 40 at time of surgery     Ovarian cyst      Patient Active Problem List   Diagnosis     Insomnia     Bariatric surgery status     Chronic headache     Recurrent herpes simplex     CARDIOVASCULAR SCREENING; LDL GOAL LESS THAN 130     Obesity, Class I, BMI 30-34.9     Adjustment disorder with anxiety     Pyelonephritis     HTN, goal below 140/90     Rotator cuff disorder     S/P rotator cuff repair     Rotator cuff (capsule) sprain     Rotator cuff tear     DDD (degenerative disc disease), lumbar     DDD (degenerative disc disease), cervical     Abnormal TSH     Cervical high risk HPV (human papillomavirus) test positive     Past Surgical History:   Procedure Laterality Date     ARTHROSCOPIC RECONSTRUCTION ANTERIOR AND POSTERIOR CRUCIATE LIGAMENT, COMBINED       BACK SURGERY  2014    steroid injections     BACK SURGERY  2014    steroid injections     BIOPSY  1991 & 1992    left ear & left breast     BREAST SURGERY  1993    lump removal     BREAST  SURGERY  1992    lump removal     COLONOSCOPY  1/22/2013    Procedure: COLONOSCOPY;  colonoscopy, loose stools, blood in stool;  Surgeon: Kamari Solomon MD;  Location: [facility]     CRYOTHERAPY  20 years ago     DILATION AND CURETTAGE SUCTION  2/24/2012    Procedure:DILATION AND CURETTAGE SUCTION; DILATION, CURETTAGE AND SUCTION ; Surgeon:JOHN HORNER; Location:[facility]     ENT SURGERY  1974    tonsils removed     LAPAROSCOPIC BYPASS GASTRIC       lump removed from left ear[       SURGICAL HISTORY OF -   1989    lt knee acl reconstruction     SURGICAL HISTORY OF -   2001    lt breast lump removal     SURGICAL HISTORY OF -   1999    gastric bypass (Katie en Y) by Dr. Fontenot at [facility]     SURGICAL HISTORY OF -   1974    tonsillectomy     SURGICAL HISTORY OF -   2005    LASIK     Social History     Socioeconomic History     Marital status:      Spouse name: Paolo ( in 2007)     Number of children: o     Years of education: 16     Highest education level: Not on file   Occupational History     Occupation: insurance brokerage agent     Employer: [employer]   Tobacco Use     Smoking status: Never Smoker     Smokeless tobacco: Never Used     Tobacco comment: EX- SMOKED IN HOUSE FOR PAST 10 YEARS   Substance and Sexual Activity     Alcohol use: No     Alcohol/week: 0.0 standard drinks     Drug use: Never     Sexual activity: Yes     Partners: Male     Birth control/protection: None   Other Topics Concern      Service Not Asked     Blood Transfusions No     Caffeine Concern Not Asked     Occupational Exposure Not Asked     Hobby Hazards Not Asked     Sleep Concern Yes     Stress Concern Yes     Weight Concern Yes     Special Diet No     Back Care Yes     Comment: LOW BACK PAIN, UPPER BACK PAIN WITH NUMBNESS IN HANDS     Exercise Yes     Comment: 2 X WEEKLY     Bike Helmet Not Asked     Seat Belt Yes     Self-Exams Yes     Parent/sibling w/ CABG, MI or angioplasty before 65F 55M?  Yes     Comment: Father   Social History Narrative     Not on file     Social Determinants of Health     Financial Resource Strain: Not on file   Food Insecurity: Not on file   Transportation Needs: Not on file   Physical Activity: Not on file   Stress: Not on file   Social Connections: Not on file   Intimate Partner Violence: Not on file   Housing Stability: Not on file     Family History   Problem Relation Age of Onset     Lipids Mother      Hypertension Mother      Anesthesia Reaction Mother      Hyperlipidemia Mother      Other Cancer Mother         Skin cancer     C.A.D. Father         first MI at 40 years old (was a smoker)     Lipids Father      Heart Disease Father      Hypertension Father      C.A.D. Maternal Grandmother         not premature presentation     Cancer Maternal Grandmother         skin     Other Cancer Maternal Grandmother         Skin cancer     C.A.D. Paternal Grandmother         not premature presentation     Diabetes Paternal Grandmother      Hypertension Paternal Grandmother      Depression Paternal Grandmother      Anxiety Disorder Brother      Mental Illness Other      Depression Other      Other Cancer Maternal Half-Sister         Skin cancer     Breast Cancer No family hx of      Cancer - colorectal No family hx of      Uric Acid   Date Value Ref Range Status   05/16/2022 4.1 2.6 - 6.0 mg/dL Final   01/10/2012 1.3 (A) 2.5 - 7.1 mg/dl      SUBJECTIVE FINDINGS:  A 52-year-old female returns to clinic for right foot pain and peroneal tendinopathy.  She relates this feels better in the boot.  The ankle brace helped a little bit, but the boot is much better.  She relates no problems wearing it.    OBJECTIVE FINDINGS:  Vascular status intact, right.  She has pain on palpation of the 4th-5th metatarsals and the 5th metatarsal base and peroneal tendons.      IMAGING:  MRIs were reviewed with the patient in clinic today.    ASSESSMENT AND PLAN:  Peroneal tendinopathy, right foot.  Stress  reaction, right 4th-5th metatarsals.  Diagnosis and treatment options discussed with the patient.  I am going to have her continue to rest, elevate and wear the CAM boot.  She relates she really has not had excessive activity.  I did advise her on sitting positions and trying to avoid stress on the lateral foot.  Return to clinic in see me in 2 weeks.  Previous notes reviewed.          Again, thank you for allowing me to participate in the care of your patient.        Sincerely,        Maynor López DPM

## 2022-06-14 ENCOUNTER — DOCUMENTATION ONLY (OUTPATIENT)
Dept: PODIATRY | Facility: CLINIC | Age: 52
End: 2022-06-14
Payer: COMMERCIAL

## 2022-06-14 DIAGNOSIS — M76.71 PERONEAL TENDINITIS OF RIGHT LOWER EXTREMITY: Primary | ICD-10-CM

## 2022-07-01 ENCOUNTER — APPOINTMENT (OUTPATIENT)
Dept: OBGYN | Facility: CLINIC | Age: 52
End: 2022-07-01

## 2022-07-01 VITALS — DIASTOLIC BLOOD PRESSURE: 82 MMHG | SYSTOLIC BLOOD PRESSURE: 114 MMHG

## 2022-07-01 LAB
BILIRUB UR QL STRIP: NORMAL
CLARITY UR: CLEAR
COLLECTION METHOD: NORMAL
GLUCOSE UR-MCNC: NORMAL
HCG UR QL: 0.2 EU/DL
HGB UR QL STRIP.AUTO: NORMAL
KETONES UR-MCNC: NORMAL
LEUKOCYTE ESTERASE UR QL STRIP: NORMAL
NITRITE UR QL STRIP: NORMAL
PH UR STRIP: 5.5
PROT UR STRIP-MCNC: NORMAL
SP GR UR STRIP: 1.03

## 2022-07-01 PROCEDURE — 99213 OFFICE O/P EST LOW 20 MIN: CPT | Mod: 25

## 2022-07-01 PROCEDURE — 81002 URINALYSIS NONAUTO W/O SCOPE: CPT

## 2022-07-02 LAB — BACTERIA UR CULT: NORMAL

## 2022-07-03 LAB
CANDIDA VAG CYTO: NOT DETECTED
G VAGINALIS+PREV SP MTYP VAG QL MICRO: NOT DETECTED
T VAGINALIS VAG QL WET PREP: NOT DETECTED

## 2022-07-18 ENCOUNTER — MYC REFILL (OUTPATIENT)
Dept: FAMILY MEDICINE | Facility: CLINIC | Age: 52
End: 2022-07-18

## 2022-07-18 DIAGNOSIS — M51.369 DDD (DEGENERATIVE DISC DISEASE), LUMBAR: ICD-10-CM

## 2022-07-19 RX ORDER — TRAMADOL HYDROCHLORIDE 50 MG/1
50 TABLET ORAL EVERY 6 HOURS PRN
Qty: 15 TABLET | Refills: 0 | Status: SHIPPED | OUTPATIENT
Start: 2022-07-19 | End: 2022-10-07

## 2022-07-22 LAB — COLOGUARD-ABSTRACT: NEGATIVE

## 2022-07-28 NOTE — RESULT ENCOUNTER NOTE
Gilbert Maher,    Attached are your test results.  Cologuard is negative    Please contact us if you have any questions.    Jaz Torres, CNP

## 2022-08-14 ENCOUNTER — HEALTH MAINTENANCE LETTER (OUTPATIENT)
Age: 52
End: 2022-08-14

## 2022-08-23 ENCOUNTER — APPOINTMENT (OUTPATIENT)
Dept: INTERNAL MEDICINE | Facility: CLINIC | Age: 52
End: 2022-08-23

## 2022-08-23 VITALS
DIASTOLIC BLOOD PRESSURE: 76 MMHG | HEIGHT: 71 IN | TEMPERATURE: 98.5 F | WEIGHT: 276 LBS | HEART RATE: 85 BPM | OXYGEN SATURATION: 98 % | BODY MASS INDEX: 38.64 KG/M2 | SYSTOLIC BLOOD PRESSURE: 130 MMHG

## 2022-08-23 DIAGNOSIS — F31.70 BIPOLAR DISORDER, CURRENTLY IN REMISSION, MOST RECENT EPISODE UNSPECIFIED: ICD-10-CM

## 2022-08-23 DIAGNOSIS — M25.512 PAIN IN LEFT SHOULDER: ICD-10-CM

## 2022-08-23 PROCEDURE — 99386 PREV VISIT NEW AGE 40-64: CPT

## 2022-08-23 RX ORDER — NITROFURANTOIN (MONOHYDRATE/MACROCRYSTALS) 25; 75 MG/1; MG/1
100 CAPSULE ORAL
Qty: 10 | Refills: 0 | Status: DISCONTINUED | COMMUNITY
Start: 2022-07-01 | End: 2022-08-23

## 2022-08-23 RX ORDER — CIPROFLOXACIN HYDROCHLORIDE 500 MG/1
500 TABLET, FILM COATED ORAL
Qty: 10 | Refills: 0 | Status: DISCONTINUED | COMMUNITY
Start: 2021-07-22 | End: 2022-08-23

## 2022-08-23 RX ORDER — NITROFURANTOIN (MONOHYDRATE/MACROCRYSTALS) 25; 75 MG/1; MG/1
100 CAPSULE ORAL
Qty: 10 | Refills: 1 | Status: DISCONTINUED | COMMUNITY
Start: 2022-03-04 | End: 2022-08-23

## 2022-08-23 NOTE — HISTORY OF PRESENT ILLNESS
[FreeTextEntry1] : here to establish care \par \par pt reports that  she has pain in the L SHOULDER \par Achy , has had it for months\par no trauma \par the pain in worse w/ movement \par \par also has some pain in  the L wrist  and the elbow

## 2022-08-23 NOTE — PHYSICAL EXAM
[Normal Sclera/Conjunctiva] : normal sclera/conjunctiva [Normal Outer Ear/Nose] : the outer ears and nose were normal in appearance [Normal Oropharynx] : the oropharynx was normal [No Edema] : there was no peripheral edema [Normal] : affect was normal and insight and judgment were intact

## 2022-08-23 NOTE — ASSESSMENT
[FreeTextEntry1] : 1)  CPE\par \par - diet / exercise discussed \par - check labs\par - mammo - ordered \par - pap /colonoscopy - UTD \par - NeedsTdap - will defer

## 2022-08-23 NOTE — HEALTH RISK ASSESSMENT
[Good] : ~his/her~  mood as  good [Former] : Former [Yes] : Yes [Monthly or less (1 pt)] : Monthly or less (1 point) [de-identified] : 15 years --> 1 pack / week  [YearQuit] : 2010 [de-identified] : none [de-identified] : regular  [de-identified] : Bipolar  [Patient reported PAP Smear was normal] : Patient reported PAP Smear was normal [Patient reported colonoscopy was normal] : Patient reported colonoscopy was normal [Change in mental status noted] : No change in mental status noted [None] : None [With Family] : lives with family [Employed] : employed [] :  [Sexually Active] : sexually active [Reports changes in hearing] : Reports no changes in hearing [Reports changes in vision] : Reports no changes in vision [Reports changes in dental health] : Reports no changes in dental health [Smoke Detector] : smoke detector [Carbon Monoxide Detector] : carbon monoxide detector [Safety elements used in home] : safety elements used in home [Seat Belt] :  uses seat belt [MammogramDate] : 2021 [PapSmearDate] : 2021 [ColonoscopyDate] : 2022

## 2022-08-24 ENCOUNTER — NON-APPOINTMENT (OUTPATIENT)
Age: 52
End: 2022-08-24

## 2022-08-24 LAB
25(OH)D3 SERPL-MCNC: 56.6 NG/ML
ALBUMIN SERPL ELPH-MCNC: 4.5 G/DL
ALP BLD-CCNC: 104 U/L
ALT SERPL-CCNC: 15 U/L
ANION GAP SERPL CALC-SCNC: 15 MMOL/L
AST SERPL-CCNC: 17 U/L
BASOPHILS # BLD AUTO: 0.08 K/UL
BASOPHILS NFR BLD AUTO: 0.6 %
BILIRUB SERPL-MCNC: 0.2 MG/DL
BUN SERPL-MCNC: 17 MG/DL
CALCIUM SERPL-MCNC: 9.4 MG/DL
CHLORIDE SERPL-SCNC: 101 MMOL/L
CHOLEST SERPL-MCNC: 259 MG/DL
CO2 SERPL-SCNC: 23 MMOL/L
CREAT SERPL-MCNC: 1 MG/DL
EGFR: 68 ML/MIN/1.73M2
EOSINOPHIL # BLD AUTO: 0.09 K/UL
EOSINOPHIL NFR BLD AUTO: 0.7 %
ESTIMATED AVERAGE GLUCOSE: 105 MG/DL
GLUCOSE SERPL-MCNC: 67 MG/DL
HBA1C MFR BLD HPLC: 5.3 %
HCT VFR BLD CALC: 41.8 %
HDLC SERPL-MCNC: 62 MG/DL
HGB BLD-MCNC: 13.5 G/DL
IMM GRANULOCYTES NFR BLD AUTO: 0.4 %
LDLC SERPL CALC-MCNC: 155 MG/DL
LYMPHOCYTES # BLD AUTO: 2.9 K/UL
LYMPHOCYTES NFR BLD AUTO: 23.1 %
MAN DIFF?: NORMAL
MCHC RBC-ENTMCNC: 30.5 PG
MCHC RBC-ENTMCNC: 32.3 GM/DL
MCV RBC AUTO: 94.6 FL
MONOCYTES # BLD AUTO: 0.78 K/UL
MONOCYTES NFR BLD AUTO: 6.2 %
NEUTROPHILS # BLD AUTO: 8.67 K/UL
NEUTROPHILS NFR BLD AUTO: 69 %
NONHDLC SERPL-MCNC: 197 MG/DL
PLATELET # BLD AUTO: 350 K/UL
POTASSIUM SERPL-SCNC: 4.8 MMOL/L
PROT SERPL-MCNC: 7.3 G/DL
RBC # BLD: 4.42 M/UL
RBC # FLD: 13.1 %
SODIUM SERPL-SCNC: 139 MMOL/L
TRIGL SERPL-MCNC: 209 MG/DL
TSH SERPL-ACNC: 3.71 UIU/ML
WBC # FLD AUTO: 12.57 K/UL

## 2022-09-26 ENCOUNTER — ASOB RESULT (OUTPATIENT)
Age: 52
End: 2022-09-26

## 2022-09-26 ENCOUNTER — APPOINTMENT (OUTPATIENT)
Dept: OBGYN | Facility: CLINIC | Age: 52
End: 2022-09-26

## 2022-09-26 VITALS
SYSTOLIC BLOOD PRESSURE: 108 MMHG | WEIGHT: 276 LBS | BODY MASS INDEX: 38.64 KG/M2 | HEIGHT: 71 IN | DIASTOLIC BLOOD PRESSURE: 71 MMHG

## 2022-09-26 LAB
BILIRUB UR QL STRIP: NORMAL
CLARITY UR: CLEAR
COLLECTION METHOD: NORMAL
GLUCOSE UR-MCNC: NORMAL
HCG UR QL: 0.2 EU/DL
HGB UR QL STRIP.AUTO: NORMAL
KETONES UR-MCNC: NORMAL
LEUKOCYTE ESTERASE UR QL STRIP: NORMAL
NITRITE UR QL STRIP: NORMAL
PH UR STRIP: 5.5
PROT UR STRIP-MCNC: NORMAL
SP GR UR STRIP: 1.01

## 2022-09-26 PROCEDURE — 99396 PREV VISIT EST AGE 40-64: CPT

## 2022-09-26 PROCEDURE — 82270 OCCULT BLOOD FECES: CPT

## 2022-09-26 PROCEDURE — 81002 URINALYSIS NONAUTO W/O SCOPE: CPT

## 2022-09-27 LAB — HPV HIGH+LOW RISK DNA PNL CVX: NOT DETECTED

## 2022-09-28 LAB — BACTERIA UR CULT: NORMAL

## 2022-09-29 LAB — CYTOLOGY CVX/VAG DOC THIN PREP: NORMAL

## 2022-10-07 ENCOUNTER — MYC REFILL (OUTPATIENT)
Dept: FAMILY MEDICINE | Facility: CLINIC | Age: 52
End: 2022-10-07

## 2022-10-07 DIAGNOSIS — M51.369 DDD (DEGENERATIVE DISC DISEASE), LUMBAR: ICD-10-CM

## 2022-10-07 RX ORDER — TRAMADOL HYDROCHLORIDE 50 MG/1
50 TABLET ORAL EVERY 6 HOURS PRN
Qty: 15 TABLET | Refills: 0 | Status: SHIPPED | OUTPATIENT
Start: 2022-10-07 | End: 2023-01-23

## 2022-11-17 NOTE — TELEPHONE ENCOUNTER
Script Signed. Please notify patient.    Lizette Gaviria MD PhD    Glycopyrrolate Pregnancy And Lactation Text: This medication is Pregnancy Category B and is considered safe during pregnancy. It is unknown if it is excreted breast milk.

## 2022-11-19 ENCOUNTER — HEALTH MAINTENANCE LETTER (OUTPATIENT)
Age: 52
End: 2022-11-19

## 2023-01-23 ENCOUNTER — MYC REFILL (OUTPATIENT)
Dept: FAMILY MEDICINE | Facility: CLINIC | Age: 53
End: 2023-01-23
Payer: COMMERCIAL

## 2023-01-23 DIAGNOSIS — M51.369 DDD (DEGENERATIVE DISC DISEASE), LUMBAR: ICD-10-CM

## 2023-01-23 RX ORDER — TRAMADOL HYDROCHLORIDE 50 MG/1
50 TABLET ORAL EVERY 6 HOURS PRN
Qty: 15 TABLET | Refills: 0 | Status: SHIPPED | OUTPATIENT
Start: 2023-01-23

## 2023-02-23 ENCOUNTER — PATIENT OUTREACH (OUTPATIENT)
Dept: FAMILY MEDICINE | Facility: CLINIC | Age: 53
End: 2023-02-23
Payer: COMMERCIAL

## 2023-02-23 DIAGNOSIS — R87.810 CERVICAL HIGH RISK HPV (HUMAN PAPILLOMAVIRUS) TEST POSITIVE: ICD-10-CM

## 2023-03-06 DIAGNOSIS — I10 HTN, GOAL BELOW 140/90: ICD-10-CM

## 2023-03-06 DIAGNOSIS — E87.6 HYPOKALEMIA: ICD-10-CM

## 2023-03-07 RX ORDER — HYDROCHLOROTHIAZIDE 25 MG/1
TABLET ORAL
Qty: 90 TABLET | Refills: 0 | Status: SHIPPED | OUTPATIENT
Start: 2023-03-07 | End: 2023-06-14

## 2023-03-07 RX ORDER — POTASSIUM CHLORIDE 1125 MG/1
TABLET, EXTENDED RELEASE ORAL
Qty: 180 TABLET | Refills: 0 | Status: SHIPPED | OUTPATIENT
Start: 2023-03-07 | End: 2023-06-14

## 2023-03-07 NOTE — TELEPHONE ENCOUNTER
Refill completed.   Needs appointment for physical and fasting labs   OK to use same day or hospital follow up slot

## 2023-03-10 NOTE — PATIENT PROFILE ADULT. - PAIN CHRONIC, PROFILE
Hospice referral received and pt is approved for home hospice  Liaison met with pt at bedside to discuss medicare hospice benefit and answer all questions  Pt stated he wanted to discuss further with spouse prior to making any decisions    Liaison contact information given to pt and will continue to follow yes

## 2023-03-21 NOTE — TELEPHONE ENCOUNTER
Patient due for Pap and HPV.    Reminder done today via telephone call. Patient has moved to [place] and will no longer be followed by . She would like reminders stopped.

## 2023-06-01 ENCOUNTER — HEALTH MAINTENANCE LETTER (OUTPATIENT)
Age: 53
End: 2023-06-01

## 2023-06-14 DIAGNOSIS — I10 HTN, GOAL BELOW 140/90: ICD-10-CM

## 2023-06-14 DIAGNOSIS — E87.6 HYPOKALEMIA: ICD-10-CM

## 2023-06-14 RX ORDER — HYDROCHLOROTHIAZIDE 25 MG/1
TABLET ORAL
Qty: 30 TABLET | Refills: 0 | Status: SHIPPED | OUTPATIENT
Start: 2023-06-14

## 2023-06-14 RX ORDER — POTASSIUM CHLORIDE 1125 MG/1
TABLET, EXTENDED RELEASE ORAL
Qty: 60 TABLET | Refills: 0 | Status: SHIPPED | OUTPATIENT
Start: 2023-06-14

## 2023-08-03 DIAGNOSIS — B00.9 RECURRENT HERPES SIMPLEX: ICD-10-CM

## 2023-08-03 RX ORDER — VALACYCLOVIR HYDROCHLORIDE 500 MG/1
500 TABLET, FILM COATED ORAL DAILY
Qty: 90 TABLET | Refills: 0 | Status: SHIPPED | OUTPATIENT
Start: 2023-08-03

## 2023-08-03 NOTE — TELEPHONE ENCOUNTER
Refill completed.   Will need in person appointment   OK to do as physical and Ok to use same day slot or hospital follow up spot

## 2023-08-03 NOTE — LETTER
August 4, 2023      Pita Maher  [address] [facility] [place] [address]        Dear Pita Maher      Your current medication request for Prescription valACYclovir (VALTREX) 500 MG tablet has been filled.    In reviewing your chart it appears you are in need of PREVENTATIVE VISIT: Physical.    If you need assistance with scheduling or have already completed these items, please call the clinic at [phone] or [facility] so your care team can review and update your records.     Thank you for choosing [facility] for your healthcare needs.          Best regards,  [facility] Team  [facility]                       Tremfya Counseling: I discussed with the patient the risks of guselkumab including but not limited to immunosuppression, serious infections, and drug reactions.  The patient understands that monitoring is required including a PPD at baseline and must alert us or the primary physician if symptoms of infection or other concerning signs are noted.

## 2023-08-03 NOTE — TELEPHONE ENCOUNTER
This writer attempted to contact patient on 08/03/23.     Reason for call provider's message and left message to call clinic back.    If patient calls back:   Relay message below, assist with scheduling appointment, document that pt called and close encounter.     THAIS Miranda  [facility]

## 2023-09-10 ENCOUNTER — HEALTH MAINTENANCE LETTER (OUTPATIENT)
Age: 53
End: 2023-09-10

## 2023-09-20 ENCOUNTER — NON-APPOINTMENT (OUTPATIENT)
Age: 53
End: 2023-09-20

## 2023-09-21 ENCOUNTER — NON-APPOINTMENT (OUTPATIENT)
Age: 53
End: 2023-09-21

## 2023-09-21 ENCOUNTER — APPOINTMENT (OUTPATIENT)
Dept: ORTHOPEDIC SURGERY | Facility: CLINIC | Age: 53
End: 2023-09-21
Payer: COMMERCIAL

## 2023-09-21 VITALS — HEIGHT: 71 IN | WEIGHT: 270 LBS | BODY MASS INDEX: 37.8 KG/M2

## 2023-09-21 DIAGNOSIS — M25.522 PAIN IN LEFT ELBOW: ICD-10-CM

## 2023-09-21 PROCEDURE — 99203 OFFICE O/P NEW LOW 30 MIN: CPT

## 2023-09-21 PROCEDURE — 73070 X-RAY EXAM OF ELBOW: CPT | Mod: LT

## 2023-09-28 ENCOUNTER — APPOINTMENT (OUTPATIENT)
Dept: OBGYN | Facility: CLINIC | Age: 53
End: 2023-09-28
Payer: COMMERCIAL

## 2023-09-28 ENCOUNTER — ASOB RESULT (OUTPATIENT)
Age: 53
End: 2023-09-28

## 2023-09-28 VITALS
SYSTOLIC BLOOD PRESSURE: 119 MMHG | WEIGHT: 270 LBS | HEIGHT: 71 IN | BODY MASS INDEX: 37.8 KG/M2 | DIASTOLIC BLOOD PRESSURE: 83 MMHG

## 2023-09-28 DIAGNOSIS — Z01.419 ENCOUNTER FOR GYNECOLOGICAL EXAMINATION (GENERAL) (ROUTINE) W/OUT ABNORMAL FINDINGS: ICD-10-CM

## 2023-09-28 PROCEDURE — 99396 PREV VISIT EST AGE 40-64: CPT

## 2023-09-28 PROCEDURE — 77080 DXA BONE DENSITY AXIAL: CPT

## 2023-09-28 PROCEDURE — 76830 TRANSVAGINAL US NON-OB: CPT

## 2023-09-28 PROCEDURE — 36415 COLL VENOUS BLD VENIPUNCTURE: CPT

## 2023-09-28 PROCEDURE — 82270 OCCULT BLOOD FECES: CPT

## 2023-09-28 PROCEDURE — 99213 OFFICE O/P EST LOW 20 MIN: CPT | Mod: 25

## 2023-10-03 LAB
BACTERIA UR CULT: NORMAL
C TRACH RRNA SPEC QL NAA+PROBE: NOT DETECTED
CYTOLOGY CVX/VAG DOC THIN PREP: NORMAL
FSH SERPL-MCNC: 40.8 IU/L
HBV SURFACE AG SER QL: NONREACTIVE
HCV AB SER QL: NONREACTIVE
HCV S/CO RATIO: 0.1 S/CO
HIV1+2 AB SPEC QL IA.RAPID: NONREACTIVE
HPV HIGH+LOW RISK DNA PNL CVX: NOT DETECTED
N GONORRHOEA RRNA SPEC QL NAA+PROBE: NOT DETECTED
SOURCE TP AMPLIFICATION: NORMAL
T PALLIDUM AB SER QL IA: NEGATIVE
T4 FREE SERPL-MCNC: 1.1 NG/DL
TESTOST FREE SERPL-MCNC: 0.4 PG/ML
TESTOST SERPL-MCNC: 11.4 NG/DL

## 2023-10-17 ENCOUNTER — APPOINTMENT (OUTPATIENT)
Dept: INTERNAL MEDICINE | Facility: CLINIC | Age: 53
End: 2023-10-17
Payer: COMMERCIAL

## 2023-10-17 VITALS
WEIGHT: 270 LBS | HEIGHT: 71 IN | SYSTOLIC BLOOD PRESSURE: 107 MMHG | DIASTOLIC BLOOD PRESSURE: 74 MMHG | HEART RATE: 82 BPM | TEMPERATURE: 98.2 F | BODY MASS INDEX: 37.8 KG/M2 | OXYGEN SATURATION: 98 %

## 2023-10-17 DIAGNOSIS — N95.1 MENOPAUSAL AND FEMALE CLIMACTERIC STATES: ICD-10-CM

## 2023-10-17 DIAGNOSIS — Z00.00 ENCOUNTER FOR GENERAL ADULT MEDICAL EXAMINATION W/OUT ABNORMAL FINDINGS: ICD-10-CM

## 2023-10-17 PROCEDURE — 99396 PREV VISIT EST AGE 40-64: CPT

## 2023-10-19 LAB
ALBUMIN SERPL ELPH-MCNC: 4.8 G/DL
ALP BLD-CCNC: 96 U/L
ALT SERPL-CCNC: 11 U/L
ANION GAP SERPL CALC-SCNC: 11 MMOL/L
APPEARANCE: CLEAR
AST SERPL-CCNC: 19 U/L
BACTERIA: ABNORMAL /HPF
BILIRUB SERPL-MCNC: 0.2 MG/DL
BILIRUBIN URINE: NEGATIVE
BLOOD URINE: NEGATIVE
BUN SERPL-MCNC: 16 MG/DL
CALCIUM SERPL-MCNC: 9.6 MG/DL
CAST: 0 /LPF
CHLORIDE SERPL-SCNC: 103 MMOL/L
CHOLEST SERPL-MCNC: 244 MG/DL
CO2 SERPL-SCNC: 27 MMOL/L
COLOR: YELLOW
CREAT SERPL-MCNC: 0.79 MG/DL
EGFR: 89 ML/MIN/1.73M2
EPITHELIAL CELLS: 5 /HPF
ESTIMATED AVERAGE GLUCOSE: 100 MG/DL
GLUCOSE QUALITATIVE U: NEGATIVE MG/DL
GLUCOSE SERPL-MCNC: 96 MG/DL
HBA1C MFR BLD HPLC: 5.1 %
HCT VFR BLD CALC: 42.2 %
HDLC SERPL-MCNC: 66 MG/DL
HGB BLD-MCNC: 13.6 G/DL
KETONES URINE: NEGATIVE MG/DL
LDLC SERPL CALC-MCNC: 157 MG/DL
LEUKOCYTE ESTERASE URINE: ABNORMAL
MCHC RBC-ENTMCNC: 30 PG
MCHC RBC-ENTMCNC: 32.2 GM/DL
MCV RBC AUTO: 93 FL
MICROSCOPIC-UA: NORMAL
NITRITE URINE: NEGATIVE
NONHDLC SERPL-MCNC: 178 MG/DL
PH URINE: 6
PLATELET # BLD AUTO: 323 K/UL
POTASSIUM SERPL-SCNC: 4.7 MMOL/L
PROT SERPL-MCNC: 7.3 G/DL
PROTEIN URINE: NEGATIVE MG/DL
RBC # BLD: 4.54 M/UL
RBC # FLD: 12.4 %
RED BLOOD CELLS URINE: 2 /HPF
SODIUM SERPL-SCNC: 141 MMOL/L
SPECIFIC GRAVITY URINE: 1.02
TRIGL SERPL-MCNC: 119 MG/DL
TSH SERPL-ACNC: 3.16 UIU/ML
UROBILINOGEN URINE: 0.2 MG/DL
WBC # FLD AUTO: 7.33 K/UL
WHITE BLOOD CELLS URINE: 7 /HPF

## 2023-10-23 ENCOUNTER — NON-APPOINTMENT (OUTPATIENT)
Age: 53
End: 2023-10-23

## 2023-11-01 ENCOUNTER — APPOINTMENT (OUTPATIENT)
Dept: OBGYN | Facility: CLINIC | Age: 53
End: 2023-11-01
Payer: COMMERCIAL

## 2023-11-01 ENCOUNTER — APPOINTMENT (OUTPATIENT)
Dept: OBGYN | Facility: CLINIC | Age: 53
End: 2023-11-01

## 2023-11-01 VITALS — DIASTOLIC BLOOD PRESSURE: 80 MMHG | BODY MASS INDEX: 37.66 KG/M2 | WEIGHT: 270 LBS | SYSTOLIC BLOOD PRESSURE: 117 MMHG

## 2023-11-01 PROCEDURE — 81002 URINALYSIS NONAUTO W/O SCOPE: CPT

## 2023-11-01 PROCEDURE — 99213 OFFICE O/P EST LOW 20 MIN: CPT

## 2023-11-05 DIAGNOSIS — B00.9 RECURRENT HERPES SIMPLEX: ICD-10-CM

## 2023-11-06 ENCOUNTER — NON-APPOINTMENT (OUTPATIENT)
Age: 53
End: 2023-11-06

## 2023-11-06 DIAGNOSIS — R39.9 UNSPECIFIED SYMPTOMS AND SIGNS INVOLVING THE GENITOURINARY SYSTEM: ICD-10-CM

## 2023-11-06 LAB
APPEARANCE: CLEAR
BACTERIA: NEGATIVE /HPF
BILIRUBIN URINE: NEGATIVE
BLOOD URINE: NEGATIVE
CANDIDA VAG CYTO: NOT DETECTED
CAST: 0 /LPF
COLOR: YELLOW
EPITHELIAL CELLS: 2 /HPF
G VAGINALIS+PREV SP MTYP VAG QL MICRO: NOT DETECTED
GLUCOSE QUALITATIVE U: NEGATIVE MG/DL
KETONES URINE: NEGATIVE MG/DL
LEUKOCYTE ESTERASE URINE: NEGATIVE
MICROSCOPIC-UA: NORMAL
NITRITE URINE: NEGATIVE
PH URINE: 6
PROTEIN URINE: NEGATIVE MG/DL
RED BLOOD CELLS URINE: 0 /HPF
SPECIFIC GRAVITY URINE: 1.02
T VAGINALIS VAG QL WET PREP: NOT DETECTED
URINE CYTOLOGY: NORMAL
UROBILINOGEN URINE: 0.2 MG/DL
WHITE BLOOD CELLS URINE: 1 /HPF

## 2023-11-06 RX ORDER — VALACYCLOVIR HYDROCHLORIDE 500 MG/1
500 TABLET, FILM COATED ORAL DAILY
Qty: 90 TABLET | Refills: 0 | OUTPATIENT
Start: 2023-11-06

## 2023-11-27 ENCOUNTER — APPOINTMENT (OUTPATIENT)
Dept: ORTHOPEDIC SURGERY | Facility: CLINIC | Age: 53
End: 2023-11-27
Payer: COMMERCIAL

## 2023-11-27 VITALS — HEIGHT: 71 IN | BODY MASS INDEX: 37.8 KG/M2 | WEIGHT: 270 LBS

## 2023-11-27 DIAGNOSIS — M75.22 BICIPITAL TENDINITIS, LEFT SHOULDER: ICD-10-CM

## 2023-11-27 DIAGNOSIS — M77.12 LATERAL EPICONDYLITIS, LEFT ELBOW: ICD-10-CM

## 2023-11-27 PROCEDURE — 73030 X-RAY EXAM OF SHOULDER: CPT | Mod: LT

## 2023-11-27 PROCEDURE — 99214 OFFICE O/P EST MOD 30 MIN: CPT

## 2023-12-06 ENCOUNTER — NON-APPOINTMENT (OUTPATIENT)
Age: 53
End: 2023-12-06

## 2023-12-20 ENCOUNTER — APPOINTMENT (OUTPATIENT)
Dept: OBGYN | Facility: CLINIC | Age: 53
End: 2023-12-20

## 2024-01-09 ENCOUNTER — APPOINTMENT (OUTPATIENT)
Dept: OBGYN | Facility: CLINIC | Age: 54
End: 2024-01-09
Payer: COMMERCIAL

## 2024-01-09 VITALS — DIASTOLIC BLOOD PRESSURE: 80 MMHG | SYSTOLIC BLOOD PRESSURE: 132 MMHG

## 2024-01-09 LAB
BILIRUB UR QL STRIP: NORMAL
CLARITY UR: CLEAR
GLUCOSE UR-MCNC: NORMAL
HCG UR QL: 0.2 EU/DL
HGB UR QL STRIP.AUTO: NORMAL
KETONES UR-MCNC: NORMAL
LEUKOCYTE ESTERASE UR QL STRIP: NORMAL
NITRITE UR QL STRIP: NORMAL
PH UR STRIP: 5.5
PROT UR STRIP-MCNC: NORMAL
SP GR UR STRIP: 1.03

## 2024-01-09 PROCEDURE — 81003 URINALYSIS AUTO W/O SCOPE: CPT | Mod: QW

## 2024-01-09 PROCEDURE — 99214 OFFICE O/P EST MOD 30 MIN: CPT | Mod: 25

## 2024-01-09 RX ORDER — CIPROFLOXACIN HYDROCHLORIDE 500 MG/1
500 TABLET, FILM COATED ORAL
Qty: 10 | Refills: 0 | Status: ACTIVE | COMMUNITY
Start: 2024-01-09 | End: 1900-01-01

## 2024-01-10 ENCOUNTER — NON-APPOINTMENT (OUTPATIENT)
Age: 54
End: 2024-01-10

## 2024-01-10 LAB
BV BACTERIA RRNA VAG QL NAA+PROBE: DETECTED
C GLABRATA RNA VAG QL NAA+PROBE: NOT DETECTED
C TRACH RRNA SPEC QL NAA+PROBE: NOT DETECTED
CANDIDA RRNA VAG QL PROBE: NOT DETECTED
N GONORRHOEA RRNA SPEC QL NAA+PROBE: NOT DETECTED
T VAGINALIS RRNA SPEC QL NAA+PROBE: NOT DETECTED

## 2024-01-16 ENCOUNTER — NON-APPOINTMENT (OUTPATIENT)
Age: 54
End: 2024-01-16

## 2024-01-17 ENCOUNTER — NON-APPOINTMENT (OUTPATIENT)
Age: 54
End: 2024-01-17

## 2024-01-19 ENCOUNTER — ASOB RESULT (OUTPATIENT)
Age: 54
End: 2024-01-19

## 2024-01-19 ENCOUNTER — APPOINTMENT (OUTPATIENT)
Dept: OBGYN | Facility: CLINIC | Age: 54
End: 2024-01-19
Payer: COMMERCIAL

## 2024-01-19 ENCOUNTER — APPOINTMENT (OUTPATIENT)
Dept: OBGYN | Facility: HOSPITAL | Age: 54
End: 2024-01-19
Payer: COMMERCIAL

## 2024-01-19 VITALS
HEIGHT: 71 IN | DIASTOLIC BLOOD PRESSURE: 79 MMHG | BODY MASS INDEX: 37.8 KG/M2 | WEIGHT: 270 LBS | SYSTOLIC BLOOD PRESSURE: 114 MMHG

## 2024-01-19 DIAGNOSIS — N89.8 OTHER SPECIFIED NONINFLAMMATORY DISORDERS OF VAGINA: ICD-10-CM

## 2024-01-19 DIAGNOSIS — R39.9 UNSPECIFIED SYMPTOMS AND SIGNS INVOLVING THE GENITOURINARY SYSTEM: ICD-10-CM

## 2024-01-19 LAB — HCG UR QL: NEGATIVE

## 2024-01-19 PROCEDURE — 81025 URINE PREGNANCY TEST: CPT

## 2024-01-19 PROCEDURE — 99213 OFFICE O/P EST LOW 20 MIN: CPT | Mod: 25

## 2024-01-19 PROCEDURE — 76830 TRANSVAGINAL US NON-OB: CPT

## 2024-01-19 PROCEDURE — 81002 URINALYSIS NONAUTO W/O SCOPE: CPT

## 2024-01-19 RX ORDER — AMOXICILLIN AND CLAVULANATE POTASSIUM 500; 125 MG/1; MG/1
500-125 TABLET, FILM COATED ORAL
Qty: 14 | Refills: 0 | Status: ACTIVE | COMMUNITY
Start: 2024-01-19 | End: 1900-01-01

## 2024-01-19 NOTE — HISTORY OF PRESENT ILLNESS
[Previously active] : previously active [No] : No [FreeTextEntry1] : 01/19/2024. TIARA WALLACE 54 year old female GP LMP 1/17/2024 presents for acute visit for skipping/heavy menses, vaginal odor, urinary frequency  Today's sono - anteverted uterus, EML 3.12, nml ovaries, adenomyosis  She reports heavy menses, leaking and saturating pads within an hour since 1/17/2024, skipped since 6/2023 usually 3-3.5 days and heavy on first day.  She reports vaginal odor and urinary pressure/frequency - contaminated UA/UCx 10/2023 and 1/9/2024 unimproved w/ round of Cipro, and BV pos 1/9/2024 s/p Metronidazole. She denies intercourse, not since 6/2023.  She denies abn discharge or vaginitis sxs. No urinary complaints. She has normal BM, no bloody stool. She denies abdominal or pelvic pain.  All: NKDA

## 2024-01-19 NOTE — PROCEDURE
[Tolerated Well] : the patient tolerated the procedure well [No Complications] : there were no complications [de-identified] : vag aptima cx

## 2024-01-19 NOTE — PLAN
[FreeTextEntry1] : 54 year old female pt presents for acute visit for skipping/heavy menses, vaginal odor, urinary frequency  Skipping menses: D/w pt s/sxs perimenopause - reassured pt that the skipping is nml unless bleeding recurs after 12 months of skipped menses  Heavy menses: Pt to call if heavy bleeding recurrs TVUS done today - anteverted uterus, EML 3.12, nml ovaries, adenomyosis  UTI: UA/UCx done today Rx for Augmentin given - d/w r/b/a  Vaginal odor: H/o BV pos 1/9/2024 - unimproved w/ Metronidazole Vag aptima cx done today  RTO in 1 year or PRN

## 2024-01-22 LAB
APPEARANCE: CLEAR
BACTERIA GENITAL AEROBE CULT: NORMAL
BACTERIA: NEGATIVE /HPF
BILIRUBIN URINE: NEGATIVE
BLOOD URINE: ABNORMAL
CAST: 0 /LPF
COLOR: YELLOW
EPITHELIAL CELLS: 1 /HPF
GLUCOSE QUALITATIVE U: NEGATIVE MG/DL
KETONES URINE: NEGATIVE MG/DL
LEUKOCYTE ESTERASE URINE: ABNORMAL
MICROSCOPIC-UA: NORMAL
NITRITE URINE: NEGATIVE
PH URINE: 6
PROTEIN URINE: NEGATIVE MG/DL
RED BLOOD CELLS URINE: NORMAL /HPF
REVIEW: NORMAL
SPECIFIC GRAVITY URINE: 1.01
UROBILINOGEN URINE: 0.2 MG/DL
WHITE BLOOD CELLS URINE: 1 /HPF

## 2024-01-23 ENCOUNTER — NON-APPOINTMENT (OUTPATIENT)
Age: 54
End: 2024-01-23

## 2024-01-23 DIAGNOSIS — N76.0 ACUTE VAGINITIS: ICD-10-CM

## 2024-01-23 DIAGNOSIS — N89.8 OTHER SPECIFIED NONINFLAMMATORY DISORDERS OF VAGINA: ICD-10-CM

## 2024-01-23 DIAGNOSIS — B96.89 ACUTE VAGINITIS: ICD-10-CM

## 2024-01-25 ENCOUNTER — NON-APPOINTMENT (OUTPATIENT)
Age: 54
End: 2024-01-25

## 2024-02-02 ENCOUNTER — APPOINTMENT (OUTPATIENT)
Dept: OBGYN | Facility: CLINIC | Age: 54
End: 2024-02-02

## 2024-02-05 ENCOUNTER — NON-APPOINTMENT (OUTPATIENT)
Age: 54
End: 2024-02-05

## 2024-02-06 RX ORDER — PHENAZOPYRIDINE 200 MG/1
200 TABLET, FILM COATED ORAL 3 TIMES DAILY
Qty: 6 | Refills: 0 | Status: ACTIVE | COMMUNITY
Start: 2024-02-06 | End: 1900-01-01

## 2024-02-06 RX ORDER — NITROFURANTOIN (MONOHYDRATE/MACROCRYSTALS) 25; 75 MG/1; MG/1
100 CAPSULE ORAL
Qty: 10 | Refills: 0 | Status: ACTIVE | COMMUNITY
Start: 2024-02-06 | End: 1900-01-01

## 2024-02-08 ENCOUNTER — APPOINTMENT (OUTPATIENT)
Dept: OTOLARYNGOLOGY | Facility: CLINIC | Age: 54
End: 2024-02-08
Payer: COMMERCIAL

## 2024-02-08 VITALS
SYSTOLIC BLOOD PRESSURE: 128 MMHG | HEART RATE: 90 BPM | DIASTOLIC BLOOD PRESSURE: 80 MMHG | BODY MASS INDEX: 37.8 KG/M2 | WEIGHT: 270 LBS | HEIGHT: 71 IN

## 2024-02-08 DIAGNOSIS — E04.1 NONTOXIC SINGLE THYROID NODULE: ICD-10-CM

## 2024-02-08 DIAGNOSIS — Z87.891 PERSONAL HISTORY OF NICOTINE DEPENDENCE: ICD-10-CM

## 2024-02-08 DIAGNOSIS — Z78.9 OTHER SPECIFIED HEALTH STATUS: ICD-10-CM

## 2024-02-08 PROCEDURE — 99203 OFFICE O/P NEW LOW 30 MIN: CPT

## 2024-02-08 RX ORDER — NYSTATIN AND TRIAMCINOLONE ACETONIDE 100000; 1 MG/G; MG/G
100000-0.1 CREAM TOPICAL TWICE DAILY
Qty: 1 | Refills: 1 | Status: COMPLETED | COMMUNITY
Start: 2023-11-01 | End: 2024-02-08

## 2024-02-08 RX ORDER — CIPROFLOXACIN HYDROCHLORIDE 500 MG/1
500 TABLET, FILM COATED ORAL
Qty: 10 | Refills: 0 | Status: COMPLETED | COMMUNITY
Start: 2023-11-06 | End: 2024-02-08

## 2024-02-08 RX ORDER — FLUCONAZOLE 150 MG/1
150 TABLET ORAL
Qty: 2 | Refills: 0 | Status: COMPLETED | COMMUNITY
Start: 2023-11-01 | End: 2024-02-08

## 2024-02-08 RX ORDER — METRONIDAZOLE 7.5 MG/G
0.75 GEL VAGINAL
Qty: 1 | Refills: 0 | Status: COMPLETED | COMMUNITY
Start: 2024-01-09 | End: 2024-02-08

## 2024-02-08 RX ORDER — METRONIDAZOLE 7.5 MG/G
0.75 GEL VAGINAL
Qty: 1 | Refills: 0 | Status: COMPLETED | COMMUNITY
Start: 2023-11-01 | End: 2024-02-08

## 2024-02-08 RX ORDER — METRONIDAZOLE 7.5 MG/G
0.75 GEL VAGINAL
Qty: 1 | Refills: 0 | Status: COMPLETED | COMMUNITY
Start: 2024-01-23 | End: 2024-02-08

## 2024-02-08 NOTE — PLAN
[TextEntry] : - Bilateral thyroid nodules. - US in the office today did not show suspicious features. Dominant nodule is 9 mm on the right. - Discussed findings with the patient today and will repeat US in 2 years. - Patient will follow up with her primary care physician.

## 2024-02-08 NOTE — HISTORY OF PRESENT ILLNESS
[de-identified] : This is a patient referred by retired ENT Dr. Jacinto Telles  for thyroid nodule. This was found in 2015 during physical exam/US. Pt states US was done in 7/2015, 6/2022, and 1/2024.  No dysphagia, dysphonia or dyspnea. Patient denies h/o radiation and has no family h/o thyroid cancer. No thyroid biopsy done.  Former smoker 1pack/week for 20 yrs on/off. Quit 2011.   US thyroid ([facility]) 1/2024: IMPRESSION: subcentimeter TR4 nodules right lobe which do not meet ACR TI RADS criteria for f/up of FNA (0.9 x 0.6 x 0.9cm previously 0.9 x 0.6 x 0.7cm, and 0.3 x 0.2 x 0.3cm in anterior lower pole previously 0.6 x 0.5 x 0.5cm)

## 2024-04-03 NOTE — ED ADULT NURSE REASSESSMENT NOTE - TEMPLATE LIST FOR HEAD TO TOE ASSESSMENT
Neuro [de-identified] : ULTRASOUND SHOULDER PERFORMED , EVALUATED, DOCUMENTED - AND REVIEWED WITH PATIENT EVALUATION  REVEALS INFLAMMATORY CHANGES WITHOUT SIGNIFICANT TENDON TEAR  PATIENT HAS ELECTED TO PROCEED WITH KENALOG INJECTION SHOULDER RISKS AND BENEFITS DISCUSSED - VERBAL CONSENT OBTAINED SEE PROCEDURE NOTE     POST INJECTION INSTRUCTIONS:   INJECTION THERAPY HANDOUT PROVIDED   COLD THERAPY , TYLENOL PRN   HOME  EXERCISES QD -  PENDULUM AND ROM  HANDOUT PROVIDED, REVIEWED AND DEMONSTRATED - REFERRED TO INSTRUCTIONAL VIDEO ON MY WEBSITE    START P.T.  WITHIN 2 WEEKS AFTER INJECTION - 2 X 4 WEEKS - PROGRESS TO HOME EXERCISES   CONSIDER REPEAT INJECTION AFTER P.T.    MRI IF NO RELIEF 12 WEEKS

## 2024-06-16 ENCOUNTER — HEALTH MAINTENANCE LETTER (OUTPATIENT)
Age: 54
End: 2024-06-16

## 2024-06-30 NOTE — DISCHARGE NOTE ADULT - HAS THE PATIENT RECEIVED THE INFLUENZA VACCINE DURING THIS VISIT
Thank you for your visit to the ED    I am prescribing you a course of steroids  Please followup with your pcp for reasessment  Return to the ED for any new lesions, pain with urination, drainage or any new or concerning complaints   
No

## 2024-08-09 ENCOUNTER — NON-APPOINTMENT (OUTPATIENT)
Age: 54
End: 2024-08-09

## 2024-08-13 ENCOUNTER — APPOINTMENT (OUTPATIENT)
Dept: OBGYN | Facility: CLINIC | Age: 54
End: 2024-08-13

## 2024-08-14 ENCOUNTER — APPOINTMENT (OUTPATIENT)
Dept: OBGYN | Facility: CLINIC | Age: 54
End: 2024-08-14
Payer: COMMERCIAL

## 2024-08-14 VITALS — DIASTOLIC BLOOD PRESSURE: 78 MMHG | SYSTOLIC BLOOD PRESSURE: 117 MMHG

## 2024-08-14 PROCEDURE — 81002 URINALYSIS NONAUTO W/O SCOPE: CPT

## 2024-08-14 PROCEDURE — 99213 OFFICE O/P EST LOW 20 MIN: CPT

## 2024-08-14 RX ORDER — METRONIDAZOLE 7.5 MG/G
0.75 GEL VAGINAL
Qty: 1 | Refills: 0 | Status: ACTIVE | COMMUNITY
Start: 2024-08-14 | End: 1900-01-01

## 2024-08-14 NOTE — HISTORY OF PRESENT ILLNESS
[FreeTextEntry1] : 53 yo with foul smelling grayish vaginal discharge x 1 week. no pelvic pain. no recent new sexual contacts

## 2024-08-14 NOTE — PLAN
[FreeTextEntry1] : Vaginitis:  vaginal culture sent Rx given for Metrgel  RTO as PRN and in 1 month for annual

## 2024-08-14 NOTE — PHYSICAL EXAM
[Appropriately responsive] : appropriately responsive [Alert] : alert [No Acute Distress] : no acute distress [Soft] : soft [Non-tender] : non-tender [Labia Majora] : normal [Labia Minora] : normal [Normal] : normal [Uterine Adnexae] : normal

## 2024-08-16 LAB
BILIRUB UR QL STRIP: NORMAL
BV BACTERIA RRNA VAG QL NAA+PROBE: DETECTED
C GLABRATA RNA VAG QL NAA+PROBE: NOT DETECTED
CANDIDA RRNA VAG QL PROBE: NOT DETECTED
CLARITY UR: CLEAR
COLLECTION METHOD: NORMAL
GLUCOSE UR-MCNC: NORMAL
HCG UR QL: 0.2 EU/DL
KETONES UR-MCNC: NORMAL
LEUKOCYTE ESTERASE UR QL STRIP: NORMAL
NITRITE UR QL STRIP: NORMAL
PROT UR STRIP-MCNC: NORMAL
SP GR UR STRIP: 1.01
T VAGINALIS RRNA SPEC QL NAA+PROBE: NOT DETECTED

## 2024-08-23 DIAGNOSIS — B37.9 CANDIDIASIS, UNSPECIFIED: ICD-10-CM

## 2024-08-29 ENCOUNTER — APPOINTMENT (OUTPATIENT)
Dept: INTERNAL MEDICINE | Facility: CLINIC | Age: 54
End: 2024-08-29
Payer: COMMERCIAL

## 2024-08-29 VITALS
TEMPERATURE: 97.9 F | SYSTOLIC BLOOD PRESSURE: 117 MMHG | BODY MASS INDEX: 36.54 KG/M2 | WEIGHT: 261 LBS | OXYGEN SATURATION: 98 % | HEIGHT: 71 IN | HEART RATE: 76 BPM | DIASTOLIC BLOOD PRESSURE: 80 MMHG

## 2024-08-29 DIAGNOSIS — R82.998 OTHER ABNORMAL FINDINGS IN URINE: ICD-10-CM

## 2024-08-29 PROCEDURE — 99213 OFFICE O/P EST LOW 20 MIN: CPT

## 2024-08-29 RX ORDER — FLUCONAZOLE 150 MG/1
150 TABLET ORAL
Qty: 2 | Refills: 0 | Status: DISCONTINUED | COMMUNITY
Start: 2024-08-23 | End: 2024-08-29

## 2024-08-29 NOTE — HISTORY OF PRESENT ILLNESS
[FreeTextEntry8] : pt is here bc she thinks that her urine smells sweet  she had been on Ozempic x 12 week - urine did not smell like this then -  in her childhood , the urine also smells like this no polyuria/ polydipsia  has gained some weight since she stopped Ozempic

## 2024-09-13 DIAGNOSIS — R82.998 OTHER ABNORMAL FINDINGS IN URINE: ICD-10-CM

## 2024-09-13 DIAGNOSIS — E66.3 OVERWEIGHT: ICD-10-CM

## 2024-09-13 RX ORDER — TIRZEPATIDE 2.5 MG/.5ML
2.5 INJECTION, SOLUTION SUBCUTANEOUS
Qty: 8 | Refills: 0 | Status: ACTIVE | COMMUNITY
Start: 2024-09-13 | End: 1900-01-01

## 2024-09-30 ENCOUNTER — APPOINTMENT (OUTPATIENT)
Dept: OBGYN | Facility: CLINIC | Age: 54
End: 2024-09-30
Payer: COMMERCIAL

## 2024-09-30 ENCOUNTER — LABORATORY RESULT (OUTPATIENT)
Age: 54
End: 2024-09-30

## 2024-09-30 VITALS
HEIGHT: 71 IN | SYSTOLIC BLOOD PRESSURE: 130 MMHG | DIASTOLIC BLOOD PRESSURE: 70 MMHG | WEIGHT: 260 LBS | BODY MASS INDEX: 36.4 KG/M2

## 2024-09-30 DIAGNOSIS — N76.0 ACUTE VAGINITIS: ICD-10-CM

## 2024-09-30 DIAGNOSIS — Z01.419 ENCOUNTER FOR GYNECOLOGICAL EXAMINATION (GENERAL) (ROUTINE) W/OUT ABNORMAL FINDINGS: ICD-10-CM

## 2024-09-30 LAB
BILIRUB UR QL STRIP: NORMAL
CLARITY UR: CLEAR
COLLECTION METHOD: NORMAL
GLUCOSE UR-MCNC: NORMAL
HCG UR QL: 0.2 EU/DL
HGB UR QL STRIP.AUTO: NORMAL
KETONES UR-MCNC: NORMAL
LEUKOCYTE ESTERASE UR QL STRIP: NORMAL
NITRITE UR QL STRIP: NORMAL
PH UR STRIP: 6
PROT UR STRIP-MCNC: NORMAL
SP GR UR STRIP: 1.01

## 2024-09-30 PROCEDURE — 99459 PELVIC EXAMINATION: CPT

## 2024-09-30 PROCEDURE — 81002 URINALYSIS NONAUTO W/O SCOPE: CPT

## 2024-09-30 PROCEDURE — 82270 OCCULT BLOOD FECES: CPT

## 2024-09-30 PROCEDURE — 36415 COLL VENOUS BLD VENIPUNCTURE: CPT

## 2024-09-30 PROCEDURE — 99213 OFFICE O/P EST LOW 20 MIN: CPT | Mod: 25

## 2024-09-30 PROCEDURE — 99396 PREV VISIT EST AGE 40-64: CPT

## 2024-09-30 PROCEDURE — 76830 TRANSVAGINAL US NON-OB: CPT

## 2024-09-30 RX ORDER — CLOTRIMAZOLE AND BETAMETHASONE DIPROPIONATE 10; .5 MG/G; MG/G
1-0.05 CREAM TOPICAL
Qty: 1 | Refills: 0 | Status: ACTIVE | COMMUNITY
Start: 2024-09-30 | End: 1900-01-01

## 2024-09-30 NOTE — HISTORY OF PRESENT ILLNESS
[Patient reported mammogram was normal] : Patient reported mammogram was normal [Patient reported breast sonogram was normal] : Patient reported breast sonogram was normal [Patient reported PAP Smear was normal] : Patient reported PAP Smear was normal [Patient reported bone density results were normal] : Patient reported bone density results were normal [No] : Patient does not have concerns regarding sex [Condoms] : Condoms [FreeTextEntry1] : 2024 MADISON MENJIVAR 54 year old female  LMP:PM. She presents for an annual gyn exam.   She reports of vaginitis sxs , she reports of white d/c very itchy. She also feels some burning when she urinates. UA in office today in office neg.  she reports last period was 2024 one day of light bleed, she also reports of hot flashes,  She denies VB. She denies abdominal and pelvic pain.. She reports some dysuria. She reports no incontinence of urine. BM is normal per patient, no blood in stool or constipation/diarrhea.  s/p BRCA testing- neg   PMH: TIA in ; Anxiety, bipolar depression, BPD, BRCA 1/2 neg (2013 had testing) ObHx: FT  x 1 Son () GynHx: No Hx of STI, FIbroids, Ovarian cysts, or pelvic infections. Remote history of abnl paps/hpv SurgHx: denies FHhx: MGM ovarian and cervical cancer in 50', father w/ colon cancer at 60 yrs old; father passed 3/3/2020 due to COVID. Denies fhx pancreatic, uterine and breast ca All: NKDA Meds: Lamotrigine and Klonopin Social: social Alcohol use; former smoker, quit in ; Son is 24  PHQ-9= 15  Follows with psychiatirst.  [TextBox_4] : pelvic sono 1/2024-normal  [Mammogramdate] : 9/2023 [TextBox_19] : BIRADS 2 [BreastSonogramDate] : 9/2023 [TextBox_25] : BIRADS 2 [PapSmeardate] : 9/2023 [TextBox_31] : NILM/HPV neg  [BoneDensityDate] : 9/2023 [ColonoscopyDate] : 9/2022

## 2024-09-30 NOTE — PLAN
[FreeTextEntry1] : Routine Gyn Exam: BSA, calcium, vitamin D and exercise d/w pt Breast and pelvic exam performed Pap/HPV conducted w/ Gc/Chl cx via pap STD blood work drawn today  Rx given for mammogram and breast sonogram Advised pt schedule colonoscopy DXA d/w pt Advised pt to see PCP and dermatology annually   Vaginitis: vaginitis panel done today  lotrisone cream to apply externally  Discussed possible vaginal estrogent  Dysuria Urine cx sent    perimenopausal sxs: R/B/A d/w pt about HT  Will Try thermella  Vaginal dryness: rx for estrogen   RTO 3 months or PRN

## 2024-09-30 NOTE — PHYSICAL EXAM
[Chaperone Present] : A chaperone was present in the examining room during all aspects of the physical examination [Appropriately responsive] : appropriately responsive [Alert] : alert [No Acute Distress] : no acute distress [No Lymphadenopathy] : no lymphadenopathy [Regular Rate Rhythm] : regular rate rhythm [No Murmurs] : no murmurs [Clear to Auscultation B/L] : clear to auscultation bilaterally [Soft] : soft [Non-tender] : non-tender [Non-distended] : non-distended [No HSM] : No HSM [No Lesions] : no lesions [No Mass] : no mass [Oriented x3] : oriented x3 [Examination Of The Breasts] : a normal appearance [No Masses] : no breast masses were palpable [Labia Majora] : normal [Labia Minora] : normal [Normal] : normal [Uterine Adnexae] : normal [50514] : A chaperone was present during the pelvic exam. [FreeTextEntry2] : Krystle Rao  [FreeTextEntry1] : mild irritation /erythema [FreeTextEntry9] : guaiac negative, no masses

## 2024-09-30 NOTE — COUNSELING
[Nutrition/ Exercise/ Weight Management] : nutrition, exercise, weight management [Vitamins/Supplements] : vitamins/supplements [Breast Self Exam] : breast self exam [Preconception Care/ Fertility options] : preconception care, fertility options [Confidentiality] : confidentiality

## 2024-10-01 LAB
HIV1+2 AB SPEC QL IA.RAPID: NONREACTIVE
T PALLIDUM AB SER QL IA: NEGATIVE

## 2024-10-02 LAB
BACTERIA UR CULT: NORMAL
C TRACH RRNA SPEC QL NAA+PROBE: NOT DETECTED
HBV SURFACE AG SER QL: NONREACTIVE
HCV AB SER QL: NONREACTIVE
HCV S/CO RATIO: 0.1 S/CO
N GONORRHOEA RRNA SPEC QL NAA+PROBE: NOT DETECTED
SOURCE TP AMPLIFICATION: NORMAL

## 2024-10-04 DIAGNOSIS — Z22.39 CARRIER OF OTHER SPECIFIED BACTERIAL DISEASES: ICD-10-CM

## 2024-10-04 LAB
A VAGINAE DNA VAG QL NAA+PROBE: NORMAL
BVAB2 DNA VAG QL NAA+PROBE: NORMAL
C KRUSEI DNA VAG QL NAA+PROBE: NEGATIVE
C KRUSEI DNA VAG QL NAA+PROBE: NEGATIVE
M GENITALIUM DNA SPEC QL NAA+PROBE: NEGATIVE
M HOMINIS DNA SPEC QL NAA+PROBE: NEGATIVE
MEGA1 DNA VAG QL NAA+PROBE: NORMAL
T VAGINALIS RRNA SPEC QL NAA+PROBE: NEGATIVE
UREAPLASMA DNA SPEC QL NAA+PROBE: POSITIVE

## 2024-10-04 RX ORDER — DOXYCYCLINE HYCLATE 100 MG/1
100 CAPSULE ORAL TWICE DAILY
Qty: 14 | Refills: 0 | Status: ACTIVE | COMMUNITY
Start: 2024-10-04 | End: 1900-01-01

## 2024-10-07 LAB — HPV HIGH+LOW RISK DNA PNL CVX: DETECTED

## 2024-10-09 ENCOUNTER — APPOINTMENT (OUTPATIENT)
Dept: OBGYN | Facility: CLINIC | Age: 54
End: 2024-10-09
Payer: COMMERCIAL

## 2024-10-09 DIAGNOSIS — R87.610 ATYPICAL SQUAMOUS CELLS OF UNDETERMINED SIGNIFICANCE ON CYTOLOGIC SMEAR OF CERVIX (ASC-US): ICD-10-CM

## 2024-10-09 DIAGNOSIS — R39.9 UNSPECIFIED SYMPTOMS AND SIGNS INVOLVING THE GENITOURINARY SYSTEM: ICD-10-CM

## 2024-10-09 DIAGNOSIS — R87.810 ATYPICAL SQUAMOUS CELLS OF UNDETERMINED SIGNIFICANCE ON CYTOLOGIC SMEAR OF CERVIX (ASC-US): ICD-10-CM

## 2024-10-09 LAB — HCG UR QL: NEGATIVE

## 2024-10-09 PROCEDURE — 57454 BX/CURETT OF CERVIX W/SCOPE: CPT

## 2024-10-09 PROCEDURE — 81025 URINE PREGNANCY TEST: CPT

## 2024-11-06 ENCOUNTER — NON-APPOINTMENT (OUTPATIENT)
Age: 54
End: 2024-11-06

## 2024-11-12 ENCOUNTER — APPOINTMENT (OUTPATIENT)
Dept: OBGYN | Facility: CLINIC | Age: 54
End: 2024-11-12
Payer: COMMERCIAL

## 2024-11-12 VITALS — SYSTOLIC BLOOD PRESSURE: 121 MMHG | DIASTOLIC BLOOD PRESSURE: 81 MMHG

## 2024-11-12 DIAGNOSIS — R30.0 DYSURIA: ICD-10-CM

## 2024-11-12 DIAGNOSIS — N95.8 OTHER SPECIFIED MENOPAUSAL AND PERIMENOPAUSAL DISORDERS: ICD-10-CM

## 2024-11-12 LAB
APPEARANCE: CLEAR
BILIRUBIN URINE: NEGATIVE
BLOOD URINE: NEGATIVE
COLOR: YELLOW
GLUCOSE QUALITATIVE U: NEGATIVE MG/DL
KETONES URINE: NEGATIVE MG/DL
LEUKOCYTE ESTERASE URINE: NEGATIVE
NITRITE URINE: NEGATIVE
PH URINE: 5.5
PROTEIN URINE: NEGATIVE MG/DL
SPECIFIC GRAVITY URINE: 1.03
UROBILINOGEN URINE: 0.2 MG/DL

## 2024-11-12 PROCEDURE — 99213 OFFICE O/P EST LOW 20 MIN: CPT

## 2024-11-13 LAB
BACTERIA UR CULT: NORMAL
BV BACTERIA RRNA VAG QL NAA+PROBE: NOT DETECTED
C GLABRATA RNA VAG QL NAA+PROBE: NOT DETECTED
CANDIDA RRNA VAG QL PROBE: NOT DETECTED
T VAGINALIS RRNA SPEC QL NAA+PROBE: NOT DETECTED

## 2024-11-19 ENCOUNTER — APPOINTMENT (OUTPATIENT)
Dept: INTERNAL MEDICINE | Facility: CLINIC | Age: 54
End: 2024-11-19
Payer: COMMERCIAL

## 2024-11-19 ENCOUNTER — APPOINTMENT (OUTPATIENT)
Dept: OBGYN | Facility: CLINIC | Age: 54
End: 2024-11-19
Payer: COMMERCIAL

## 2024-11-19 VITALS
TEMPERATURE: 98.1 F | WEIGHT: 255 LBS | SYSTOLIC BLOOD PRESSURE: 113 MMHG | BODY MASS INDEX: 35.7 KG/M2 | DIASTOLIC BLOOD PRESSURE: 76 MMHG | HEART RATE: 75 BPM | OXYGEN SATURATION: 98 % | HEIGHT: 71 IN

## 2024-11-19 DIAGNOSIS — R82.998 OTHER ABNORMAL FINDINGS IN URINE: ICD-10-CM

## 2024-11-19 DIAGNOSIS — Z00.00 ENCOUNTER FOR GENERAL ADULT MEDICAL EXAMINATION W/OUT ABNORMAL FINDINGS: ICD-10-CM

## 2024-11-19 DIAGNOSIS — Z23 ENCOUNTER FOR IMMUNIZATION: ICD-10-CM

## 2024-11-19 PROCEDURE — 90656 IIV3 VACC NO PRSV 0.5 ML IM: CPT

## 2024-11-19 PROCEDURE — 99213 OFFICE O/P EST LOW 20 MIN: CPT

## 2024-11-19 PROCEDURE — 99396 PREV VISIT EST AGE 40-64: CPT | Mod: 25

## 2024-11-19 PROCEDURE — G0008: CPT

## 2024-11-19 PROCEDURE — 36415 COLL VENOUS BLD VENIPUNCTURE: CPT

## 2024-11-19 RX ORDER — ESTRADIOL 0.1 MG/G
0.1 CREAM VAGINAL
Qty: 1 | Refills: 2 | Status: DISCONTINUED | COMMUNITY
Start: 2024-11-12 | End: 2024-11-19

## 2024-11-20 LAB
ALBUMIN SERPL ELPH-MCNC: 4.2 G/DL
ALP BLD-CCNC: 102 U/L
ALT SERPL-CCNC: 5 U/L
ANION GAP SERPL CALC-SCNC: 12 MMOL/L
APPEARANCE: CLEAR
AST SERPL-CCNC: 12 U/L
BILIRUB SERPL-MCNC: <0.2 MG/DL
BILIRUBIN URINE: NEGATIVE
BLOOD URINE: NEGATIVE
BUN SERPL-MCNC: 13 MG/DL
CALCIUM SERPL-MCNC: 9.1 MG/DL
CHLORIDE SERPL-SCNC: 104 MMOL/L
CHOLEST SERPL-MCNC: 234 MG/DL
CO2 SERPL-SCNC: 25 MMOL/L
COLOR: YELLOW
CREAT SERPL-MCNC: 0.72 MG/DL
EGFR: 99 ML/MIN/1.73M2
ESTIMATED AVERAGE GLUCOSE: 111 MG/DL
GLUCOSE QUALITATIVE U: NEGATIVE MG/DL
GLUCOSE SERPL-MCNC: 76 MG/DL
HBA1C MFR BLD HPLC: 5.5 %
HCT VFR BLD CALC: 40.8 %
HDLC SERPL-MCNC: 54 MG/DL
HGB BLD-MCNC: 13.3 G/DL
KETONES URINE: ABNORMAL MG/DL
LDLC SERPL CALC-MCNC: 145 MG/DL
LEUKOCYTE ESTERASE URINE: NEGATIVE
MCHC RBC-ENTMCNC: 31.1 PG
MCHC RBC-ENTMCNC: 32.6 G/DL
MCV RBC AUTO: 95.6 FL
NITRITE URINE: NEGATIVE
NONHDLC SERPL-MCNC: 179 MG/DL
PH URINE: 5.5
PLATELET # BLD AUTO: 287 K/UL
POTASSIUM SERPL-SCNC: 4.4 MMOL/L
PROT SERPL-MCNC: 6.6 G/DL
PROTEIN URINE: NEGATIVE MG/DL
RBC # BLD: 4.27 M/UL
RBC # FLD: 12.6 %
SODIUM SERPL-SCNC: 141 MMOL/L
SPECIFIC GRAVITY URINE: 1.02
TRIGL SERPL-MCNC: 194 MG/DL
TSH SERPL-ACNC: 2.82 UIU/ML
UROBILINOGEN URINE: 0.2 MG/DL
WBC # FLD AUTO: 7.41 K/UL

## 2024-11-22 LAB — BACTERIA UR CULT: NORMAL

## 2024-11-25 LAB
A VAGINAE DNA VAG QL NAA+PROBE: NORMAL
BACTERIA GENITAL AEROBE CULT: NORMAL
BVAB2 DNA VAG QL NAA+PROBE: NORMAL
C KRUSEI DNA VAG QL NAA+PROBE: NEGATIVE
C KRUSEI DNA VAG QL NAA+PROBE: NEGATIVE
M GENITALIUM DNA SPEC QL NAA+PROBE: NEGATIVE
M HOMINIS DNA SPEC QL NAA+PROBE: NEGATIVE
MEGA1 DNA VAG QL NAA+PROBE: NORMAL
T VAGINALIS RRNA SPEC QL NAA+PROBE: NEGATIVE
UREAPLASMA DNA SPEC QL NAA+PROBE: NEGATIVE

## 2024-12-23 ENCOUNTER — APPOINTMENT (OUTPATIENT)
Dept: UROGYNECOLOGY | Facility: CLINIC | Age: 54
End: 2024-12-23

## 2025-02-20 DIAGNOSIS — R30.0 DYSURIA: ICD-10-CM

## 2025-03-17 ENCOUNTER — APPOINTMENT (OUTPATIENT)
Dept: OBGYN | Facility: CLINIC | Age: 55
End: 2025-03-17

## 2025-03-17 ENCOUNTER — APPOINTMENT (OUTPATIENT)
Dept: UROGYNECOLOGY | Facility: CLINIC | Age: 55
End: 2025-03-17

## 2025-03-19 ENCOUNTER — APPOINTMENT (OUTPATIENT)
Dept: OBGYN | Facility: CLINIC | Age: 55
End: 2025-03-19

## 2025-03-22 NOTE — ED ADULT NURSE REASSESSMENT NOTE - TEMPLATE LIST FOR HEAD TO TOE ASSESSMENT
Neuro Pt received alert and fully oriented. Verbalizes pain in back and abdomen, PRN norco provided, moderately effective per pt.    Fall precautions in place. PIV's flushed and capped, NS infusing at 100mL/hr.    Lidocaine patch place to R mid back    Telemetry monitoring removed.

## 2025-03-31 ENCOUNTER — APPOINTMENT (OUTPATIENT)
Dept: OBGYN | Facility: CLINIC | Age: 55
End: 2025-03-31

## 2025-06-21 ENCOUNTER — HEALTH MAINTENANCE LETTER (OUTPATIENT)
Age: 55
End: 2025-06-21

## 2025-07-02 ENCOUNTER — PATIENT OUTREACH (OUTPATIENT)
Dept: CARE COORDINATION | Facility: CLINIC | Age: 55
End: 2025-07-02
Payer: COMMERCIAL

## 2025-07-03 DIAGNOSIS — Z12.11 COLON CANCER SCREENING: ICD-10-CM

## 2025-07-17 ENCOUNTER — ORDERS ONLY (AUTO-RELEASED) (OUTPATIENT)
Dept: URGENT CARE | Facility: CLINIC | Age: 55
End: 2025-07-17
Payer: COMMERCIAL

## 2025-07-17 DIAGNOSIS — Z12.11 COLON CANCER SCREENING: ICD-10-CM

## 2025-08-23 LAB — NONINV COLON CA DNA+OCC BLD SCRN STL QL: NEGATIVE
